# Patient Record
Sex: FEMALE | Race: BLACK OR AFRICAN AMERICAN | Employment: UNEMPLOYED | URBAN - METROPOLITAN AREA
[De-identification: names, ages, dates, MRNs, and addresses within clinical notes are randomized per-mention and may not be internally consistent; named-entity substitution may affect disease eponyms.]

---

## 2022-06-15 ENCOUNTER — HOSPITAL ENCOUNTER (OUTPATIENT)
Age: 37
Setting detail: OUTPATIENT SURGERY
Discharge: HOME | End: 2022-06-15
Attending: OBSTETRICS & GYNECOLOGY | Admitting: OBSTETRICS & GYNECOLOGY
Payer: COMMERCIAL

## 2022-06-15 ENCOUNTER — OFFICE VISIT (OUTPATIENT)
Dept: GYNECOLOGY | Age: 37
End: 2022-06-15
Payer: COMMERCIAL

## 2022-06-15 ENCOUNTER — ANESTHESIA EVENT (OUTPATIENT)
Dept: SURGERY | Age: 37
End: 2022-06-15
Payer: COMMERCIAL

## 2022-06-15 ENCOUNTER — ANESTHESIA (OUTPATIENT)
Dept: SURGERY | Age: 37
End: 2022-06-15
Payer: COMMERCIAL

## 2022-06-15 VITALS
WEIGHT: 230 LBS | HEART RATE: 82 BPM | SYSTOLIC BLOOD PRESSURE: 130 MMHG | OXYGEN SATURATION: 98 % | TEMPERATURE: 99.6 F | RESPIRATION RATE: 16 BRPM | DIASTOLIC BLOOD PRESSURE: 80 MMHG | BODY MASS INDEX: 40.74 KG/M2

## 2022-06-15 VITALS
SYSTOLIC BLOOD PRESSURE: 122 MMHG | BODY MASS INDEX: 40.75 KG/M2 | DIASTOLIC BLOOD PRESSURE: 84 MMHG | HEIGHT: 63 IN | WEIGHT: 230 LBS

## 2022-06-15 DIAGNOSIS — N92.0 MENORRHAGIA WITH REGULAR CYCLE: ICD-10-CM

## 2022-06-15 DIAGNOSIS — D50.0 ANEMIA DUE TO CHRONIC BLOOD LOSS: Primary | ICD-10-CM

## 2022-06-15 DIAGNOSIS — D21.9 FIBROID: ICD-10-CM

## 2022-06-15 DIAGNOSIS — N93.9 ABNORMAL UTERINE BLEEDING (AUB): Primary | ICD-10-CM

## 2022-06-15 DIAGNOSIS — R93.89 THICKENED ENDOMETRIUM: ICD-10-CM

## 2022-06-15 DIAGNOSIS — D64.9 ANEMIA, UNSPECIFIED TYPE: ICD-10-CM

## 2022-06-15 LAB
BASOPHILS # BLD: 0 K/UL (ref 0–0.2)
BASOPHILS NFR BLD: 1 % (ref 0–2)
DIFFERENTIAL METHOD BLD: ABNORMAL
EOSINOPHIL # BLD: 0.1 K/UL (ref 0–0.8)
EOSINOPHIL NFR BLD: 1 % (ref 0.5–7.8)
ERYTHROCYTE [DISTWIDTH] IN BLOOD BY AUTOMATED COUNT: 21.1 % (ref 11.9–14.6)
ERYTHROCYTE [DISTWIDTH] IN BLOOD BY AUTOMATED COUNT: 22.3 % (ref 11.9–14.6)
HCG SERPL QL: NEGATIVE
HCT VFR BLD AUTO: 22.1 % (ref 35.8–46.3)
HCT VFR BLD AUTO: 27.1 % (ref 35.8–46.3)
HEMOGLOBIN, POC: 7.1 G/DL
HGB BLD-MCNC: 5.7 G/DL (ref 11.7–15.4)
HGB BLD-MCNC: 7.7 G/DL (ref 11.7–15.4)
HISTORY CHECK: NORMAL
IMM GRANULOCYTES # BLD AUTO: 0.1 K/UL (ref 0–0.5)
IMM GRANULOCYTES NFR BLD AUTO: 1 % (ref 0–5)
LYMPHOCYTES # BLD: 1.9 K/UL (ref 0.5–4.6)
LYMPHOCYTES NFR BLD: 25 % (ref 13–44)
MCH RBC QN AUTO: 19.7 PG (ref 26.1–32.9)
MCH RBC QN AUTO: 22.8 PG (ref 26.1–32.9)
MCHC RBC AUTO-ENTMCNC: 25.8 G/DL (ref 31.4–35)
MCHC RBC AUTO-ENTMCNC: 28.4 G/DL (ref 31.4–35)
MCV RBC AUTO: 76.2 FL (ref 79.6–97.8)
MCV RBC AUTO: 80.4 FL (ref 79.6–97.8)
MONOCYTES # BLD: 0.4 K/UL (ref 0.1–1.3)
MONOCYTES NFR BLD: 6 % (ref 4–12)
NEUTS SEG # BLD: 5.2 K/UL (ref 1.7–8.2)
NEUTS SEG NFR BLD: 68 % (ref 43–78)
NRBC # BLD: 0.12 K/UL (ref 0–0.2)
NRBC # BLD: 0.13 K/UL (ref 0–0.2)
PLATELET # BLD AUTO: 419 K/UL (ref 150–450)
PLATELET # BLD AUTO: 430 K/UL (ref 150–450)
PMV BLD AUTO: 10.6 FL (ref 9.4–12.3)
PMV BLD AUTO: 11.4 FL (ref 9.4–12.3)
RBC # BLD AUTO: 2.9 M/UL (ref 4.05–5.2)
RBC # BLD AUTO: 3.37 M/UL (ref 4.05–5.2)
WBC # BLD AUTO: 7.6 K/UL (ref 4.3–11.1)
WBC # BLD AUTO: 8.5 K/UL (ref 4.3–11.1)

## 2022-06-15 PROCEDURE — 85027 COMPLETE CBC AUTOMATED: CPT

## 2022-06-15 PROCEDURE — 2580000003 HC RX 258: Performed by: NURSE ANESTHETIST, CERTIFIED REGISTERED

## 2022-06-15 PROCEDURE — 85018 HEMOGLOBIN: CPT | Performed by: OBSTETRICS & GYNECOLOGY

## 2022-06-15 PROCEDURE — 99203 OFFICE O/P NEW LOW 30 MIN: CPT | Performed by: OBSTETRICS & GYNECOLOGY

## 2022-06-15 PROCEDURE — 3600000003 HC SURGERY LEVEL 3 BASE: Performed by: OBSTETRICS & GYNECOLOGY

## 2022-06-15 PROCEDURE — 85025 COMPLETE CBC W/AUTO DIFF WBC: CPT

## 2022-06-15 PROCEDURE — 3700000000 HC ANESTHESIA ATTENDED CARE: Performed by: OBSTETRICS & GYNECOLOGY

## 2022-06-15 PROCEDURE — 58558 HYSTEROSCOPY BIOPSY: CPT | Performed by: OBSTETRICS & GYNECOLOGY

## 2022-06-15 PROCEDURE — P9016 RBC LEUKOCYTES REDUCED: HCPCS

## 2022-06-15 PROCEDURE — 86923 COMPATIBILITY TEST ELECTRIC: CPT

## 2022-06-15 PROCEDURE — 86901 BLOOD TYPING SEROLOGIC RH(D): CPT

## 2022-06-15 PROCEDURE — 7100000000 HC PACU RECOVERY - FIRST 15 MIN: Performed by: OBSTETRICS & GYNECOLOGY

## 2022-06-15 PROCEDURE — 84703 CHORIONIC GONADOTROPIN ASSAY: CPT

## 2022-06-15 PROCEDURE — 2500000003 HC RX 250 WO HCPCS: Performed by: NURSE ANESTHETIST, CERTIFIED REGISTERED

## 2022-06-15 PROCEDURE — 88305 TISSUE EXAM BY PATHOLOGIST: CPT

## 2022-06-15 PROCEDURE — 7100000001 HC PACU RECOVERY - ADDTL 15 MIN: Performed by: OBSTETRICS & GYNECOLOGY

## 2022-06-15 PROCEDURE — 7100000010 HC PHASE II RECOVERY - FIRST 15 MIN: Performed by: OBSTETRICS & GYNECOLOGY

## 2022-06-15 PROCEDURE — 6360000002 HC RX W HCPCS: Performed by: NURSE ANESTHETIST, CERTIFIED REGISTERED

## 2022-06-15 PROCEDURE — 3700000001 HC ADD 15 MINUTES (ANESTHESIA): Performed by: OBSTETRICS & GYNECOLOGY

## 2022-06-15 PROCEDURE — 2709999900 HC NON-CHARGEABLE SUPPLY: Performed by: OBSTETRICS & GYNECOLOGY

## 2022-06-15 PROCEDURE — 3600000013 HC SURGERY LEVEL 3 ADDTL 15MIN: Performed by: OBSTETRICS & GYNECOLOGY

## 2022-06-15 PROCEDURE — 76830 TRANSVAGINAL US NON-OB: CPT | Performed by: OBSTETRICS & GYNECOLOGY

## 2022-06-15 RX ORDER — OXYCODONE HYDROCHLORIDE 5 MG/1
5 TABLET ORAL
Status: DISCONTINUED | OUTPATIENT
Start: 2022-06-15 | End: 2022-06-15 | Stop reason: HOSPADM

## 2022-06-15 RX ORDER — LIDOCAINE HYDROCHLORIDE 20 MG/ML
INJECTION, SOLUTION EPIDURAL; INFILTRATION; INTRACAUDAL; PERINEURAL PRN
Status: DISCONTINUED | OUTPATIENT
Start: 2022-06-15 | End: 2022-06-15 | Stop reason: SDUPTHER

## 2022-06-15 RX ORDER — FENTANYL CITRATE 50 UG/ML
INJECTION, SOLUTION INTRAMUSCULAR; INTRAVENOUS PRN
Status: DISCONTINUED | OUTPATIENT
Start: 2022-06-15 | End: 2022-06-15 | Stop reason: SDUPTHER

## 2022-06-15 RX ORDER — MEDROXYPROGESTERONE ACETATE 10 MG/1
10 TABLET ORAL 2 TIMES DAILY
Qty: 30 TABLET | Refills: 3 | Status: SHIPPED | OUTPATIENT
Start: 2022-06-15 | End: 2022-09-30

## 2022-06-15 RX ORDER — SUCCINYLCHOLINE/SOD CL,ISO/PF 200MG/10ML
SYRINGE (ML) INTRAVENOUS PRN
Status: DISCONTINUED | OUTPATIENT
Start: 2022-06-15 | End: 2022-06-15 | Stop reason: SDUPTHER

## 2022-06-15 RX ORDER — HYDROCODONE BITARTRATE AND ACETAMINOPHEN 5; 325 MG/1; MG/1
1 TABLET ORAL EVERY 6 HOURS PRN
Qty: 12 TABLET | Refills: 0 | Status: SHIPPED | OUTPATIENT
Start: 2022-06-15 | End: 2022-06-18

## 2022-06-15 RX ORDER — PROCHLORPERAZINE EDISYLATE 5 MG/ML
5 INJECTION INTRAMUSCULAR; INTRAVENOUS
Status: DISCONTINUED | OUTPATIENT
Start: 2022-06-15 | End: 2022-06-15 | Stop reason: HOSPADM

## 2022-06-15 RX ORDER — SODIUM CHLORIDE 0.9 % (FLUSH) 0.9 %
5-40 SYRINGE (ML) INJECTION EVERY 12 HOURS SCHEDULED
Status: DISCONTINUED | OUTPATIENT
Start: 2022-06-15 | End: 2022-06-15 | Stop reason: HOSPADM

## 2022-06-15 RX ORDER — SODIUM CHLORIDE 0.9 % (FLUSH) 0.9 %
5-40 SYRINGE (ML) INJECTION PRN
Status: DISCONTINUED | OUTPATIENT
Start: 2022-06-15 | End: 2022-06-15 | Stop reason: HOSPADM

## 2022-06-15 RX ORDER — DEXAMETHASONE SODIUM PHOSPHATE 10 MG/ML
INJECTION INTRAMUSCULAR; INTRAVENOUS PRN
Status: DISCONTINUED | OUTPATIENT
Start: 2022-06-15 | End: 2022-06-15 | Stop reason: SDUPTHER

## 2022-06-15 RX ORDER — MIDAZOLAM HYDROCHLORIDE 2 MG/2ML
2 INJECTION, SOLUTION INTRAMUSCULAR; INTRAVENOUS
Status: CANCELLED | OUTPATIENT
Start: 2022-06-15 | End: 2022-06-15

## 2022-06-15 RX ORDER — LIDOCAINE HYDROCHLORIDE 10 MG/ML
1 INJECTION, SOLUTION EPIDURAL; INFILTRATION; INTRACAUDAL; PERINEURAL
Status: CANCELLED | OUTPATIENT
Start: 2022-06-15 | End: 2022-06-15

## 2022-06-15 RX ORDER — HYDROMORPHONE HYDROCHLORIDE 1 MG/ML
0.5 INJECTION, SOLUTION INTRAMUSCULAR; INTRAVENOUS; SUBCUTANEOUS EVERY 5 MIN PRN
Status: DISCONTINUED | OUTPATIENT
Start: 2022-06-15 | End: 2022-06-15 | Stop reason: HOSPADM

## 2022-06-15 RX ORDER — DOXYCYCLINE HYCLATE 50 MG/1
324 CAPSULE, GELATIN COATED ORAL
Qty: 30 TABLET | Refills: 3 | Status: SHIPPED | OUTPATIENT
Start: 2022-06-15 | End: 2022-09-30

## 2022-06-15 RX ORDER — SODIUM CHLORIDE 9 MG/ML
INJECTION, SOLUTION INTRAVENOUS PRN
Status: DISCONTINUED | OUTPATIENT
Start: 2022-06-15 | End: 2022-06-15 | Stop reason: HOSPADM

## 2022-06-15 RX ORDER — ONDANSETRON 2 MG/ML
INJECTION INTRAMUSCULAR; INTRAVENOUS PRN
Status: DISCONTINUED | OUTPATIENT
Start: 2022-06-15 | End: 2022-06-15 | Stop reason: SDUPTHER

## 2022-06-15 RX ORDER — PROPOFOL 10 MG/ML
INJECTION, EMULSION INTRAVENOUS PRN
Status: DISCONTINUED | OUTPATIENT
Start: 2022-06-15 | End: 2022-06-15 | Stop reason: SDUPTHER

## 2022-06-15 RX ORDER — SODIUM CHLORIDE, SODIUM LACTATE, POTASSIUM CHLORIDE, CALCIUM CHLORIDE 600; 310; 30; 20 MG/100ML; MG/100ML; MG/100ML; MG/100ML
INJECTION, SOLUTION INTRAVENOUS CONTINUOUS PRN
Status: DISCONTINUED | OUTPATIENT
Start: 2022-06-15 | End: 2022-06-15 | Stop reason: SDUPTHER

## 2022-06-15 RX ORDER — FENTANYL CITRATE 50 UG/ML
100 INJECTION, SOLUTION INTRAMUSCULAR; INTRAVENOUS
Status: CANCELLED | OUTPATIENT
Start: 2022-06-15 | End: 2022-06-15

## 2022-06-15 RX ORDER — IBUPROFEN 600 MG/1
600 TABLET ORAL 4 TIMES DAILY PRN
Qty: 40 TABLET | Refills: 0 | Status: SHIPPED | OUTPATIENT
Start: 2022-06-15

## 2022-06-15 RX ORDER — SODIUM CHLORIDE, SODIUM LACTATE, POTASSIUM CHLORIDE, CALCIUM CHLORIDE 600; 310; 30; 20 MG/100ML; MG/100ML; MG/100ML; MG/100ML
100 INJECTION, SOLUTION INTRAVENOUS CONTINUOUS
Status: CANCELLED | OUTPATIENT
Start: 2022-06-15

## 2022-06-15 RX ADMIN — LIDOCAINE HYDROCHLORIDE 60 MG: 20 INJECTION, SOLUTION EPIDURAL; INFILTRATION; INTRACAUDAL; PERINEURAL at 15:53

## 2022-06-15 RX ADMIN — DEXAMETHASONE SODIUM PHOSPHATE 10 MG: 10 INJECTION INTRAMUSCULAR; INTRAVENOUS at 16:13

## 2022-06-15 RX ADMIN — SODIUM CHLORIDE, SODIUM LACTATE, POTASSIUM CHLORIDE, AND CALCIUM CHLORIDE: 600; 310; 30; 20 INJECTION, SOLUTION INTRAVENOUS at 16:32

## 2022-06-15 RX ADMIN — ONDANSETRON 4 MG: 2 INJECTION INTRAMUSCULAR; INTRAVENOUS at 16:13

## 2022-06-15 RX ADMIN — SODIUM CHLORIDE, SODIUM LACTATE, POTASSIUM CHLORIDE, AND CALCIUM CHLORIDE: 600; 310; 30; 20 INJECTION, SOLUTION INTRAVENOUS at 15:47

## 2022-06-15 RX ADMIN — PHENYLEPHRINE HYDROCHLORIDE 100 MCG: 0.1 INJECTION, SOLUTION INTRAVENOUS at 16:17

## 2022-06-15 RX ADMIN — Medication 180 MG: at 15:53

## 2022-06-15 RX ADMIN — FENTANYL CITRATE 100 MCG: 50 INJECTION INTRAMUSCULAR; INTRAVENOUS at 15:51

## 2022-06-15 RX ADMIN — PROPOFOL 150 MG: 10 INJECTION, EMULSION INTRAVENOUS at 15:53

## 2022-06-15 ASSESSMENT — PAIN - FUNCTIONAL ASSESSMENT: PAIN_FUNCTIONAL_ASSESSMENT: 0-10

## 2022-06-15 NOTE — OP NOTE
HYSTEROSCOPY WITH DILATATION AND CURETTAGE    Je Pratt  [id number]    DATE OF PROCEDURE: 6/15/2022     PREOPERATIVE DIAGNOSIS: Menorrhagia with regular cycle [N92.0] , Anemia due to chronic and acute blood loss    POSTOPERATWE DIAGNOSIS: same    PROCEDURE: Hysteroscopy with dilatation & curettage. SURGEON: Peng Ibrahim MD    ANESTHESIA: General.    DRAINS: Sterile in and out catheterization of the bladder. BLOOD LOSS ESTIMATED: less than 100 ml    SPECIMENS: profound amount of  Endometrial curettings      FINDINGS: Normal endocervical canal. Endometrial cavity was markedly enlarged, symmetrical, homogenous with palpation and curettage. Tubal ostia was not initially visible. After curettage, the endometrial cavity was symmetrical and tubal ostia noted to be in appropriate locations. No visible evidence of any surface irregularities of the endometrial surface and no visible asymmetry to suggest a point of penetration or an embedded foreign object    PROCEDURE IN DETAIL: The patient was taken to the Operating Room. The patient was anesthetized using General. After adequate anesthesia was established she was properly positioned, scrubbed, prepped and draped. Time out was done to confirm the operating procedure, surgeon, patient and site. Once confirmed by the team, procedure was started. The weighted speculum was placed in the vault to expose the cervix, was grasped at 12 oclock with the single-tooth tenaculum and sounded to 16 cm. It was sequentially dilated prior to the hysteroscope being inserted with the above noted findings. A very gentle but homogenous curetting was done starting in the midline and working in a clockwise manner. A profound amount of endometrial tissue was reirieved. Arturo-Stone forceps were used to remove the clots and tissue. The hysteroscope was reinserted again. There was no evidence of any surface irregularity to suggest recent penetration.  With this complete and thorough visual review, we terminated the procedure. There was no bleeding at the tenaculum site. With the sponge, instrument and needle count correct, the patient was awakened and taken to the Recovery Room in satisfactory condition. She will be discharged home to follow-up in the office in two weeks. She is to call for increased bleeding,  increased pain, fever, or any other concerns. Michael Lacey

## 2022-06-15 NOTE — PERIOP NOTE
Discharge teaching/follow up appt reviewed with patient and caregiver. Caregiver voiced understanding of teaching. All questions answered. Hgb 7.7. Dr. Deanne Ng ok with patient being discharged.

## 2022-06-15 NOTE — CONSENT
Informed Consent for Blood Component Transfusion Note    I have discussed with the patient the rationale for blood component transfusion; its benefits in treating or preventing fatigue, organ damage, or death; and its risk which includes mild transfusion reactions, rare risk of blood borne infection, or more serious but rare reactions. I have discussed the alternatives to transfusion, including the risk and consequences of not receiving transfusion. The patient had an opportunity to ask questions and had agreed to proceed with transfusion of blood components.     Electronically signed by Michell Fischer MD on 6/15/22 at 2:40 PM EDT

## 2022-06-15 NOTE — PROGRESS NOTES
EVERARDO Coronado is a 44 y.o. female seen for menorrhagia. Her period started 10 days ago and has been extremely heavy with large clots. Her periods have always been heavy with clots but they are becoming progressively heavier. In the past she had a D&C which showed complex simple hyperplasia. She is trying to have a baby. Hgb was 7 in the office today    Past Medical History, Past Surgical History, Family history, Social History, and Medications were all reviewed with the patient today and updated as necessary. Current Outpatient Medications   Medication Sig    Ferrous Gluconate-C-Folic Acid (IRON-C PO) Take by mouth     No current facility-administered medications for this visit. No Known Allergies  Past Medical History:   Diagnosis Date    Fibroid     Iron deficiency anemia     Thickened endometrium      Past Surgical History:   Procedure Laterality Date    APPENDECTOMY      CHOLECYSTECTOMY      DILATION AND CURETTAGE OF UTERUS       History reviewed. No pertinent family history. Social History     Tobacco Use    Smoking status: Never Smoker    Smokeless tobacco: Never Used   Substance Use Topics    Alcohol use: Not Currently       Social History     Substance and Sexual Activity   Sexual Activity Yes    Partners: Male     OB History    Para Term  AB Living   1 1 0 0 0 0   SAB IAB Ectopic Molar Multiple Live Births   0 0 0 0 0 0      # Outcome Date GA Lbr Logan/2nd Weight Sex Delivery Anes PTL Lv   1 Para                Health Maintenance  Mammogram:   Colonoscopy:   Bone Density:    ROS:    Review of Systems  General: Not Present- Chills, Fever, Fatigue, Insomnia, Hot flashes/Night sweats, Weight gain  Skin: Not Present- Bruising, Change in Wart/Mole, Excessive Sweating, Itching, Nail Changes, New Lesions, Rash, Skin Color Changes and Ulcer.   HEENT: Not Present- Headache, Blurred Vision, Double Vision, Glaucoma, Visual Disturbances, Hearing Loss, Ringing in the Ears, Vertigo, Nose Bleed, Bleeding Gums, Hoarseness and Sore Throat. Neck: Not Present- Neck Pain and Neck Swelling. Respiratory: Not Present- Cough, Difficulty Breathing and Difficulty Breathing on Exertion. Breast: Not Present- Breast Mass, Breast Pain, Breast Swelling, Nipple Discharge, Nipple Pain, Recent Breast Size Changes and Skin Changes. Cardiovascular: Not Present- Abnormal Blood Pressure, Chest Pain, Edema, Fainting / Blacking Out, Palpitations, Shortness of Breath and Swelling of Extremities. Gastrointestinal: Not Present- Abdominal Pain, Abdominal Swelling, Bloating, Change in Bowel Habits, Constipation, Diarrhea, Difficulty Swallowing, Gets full quickly at meals, Nausea, Rectal Bleeding and Vomiting. Female Genitourinary: Not Present- Dysmenorrhea, Dyspareunia, Decreased libido, Excessive Menstrual Bleeding, Menstrual Irregularities, Pelvic Pain, Urinary Complaints, Vaginal Discharge, Vaginal itching/burning, Vaginal odor  Musculoskeletal: Not Present- Joint Pain and Muscle Pain. Neurological: Not Present- Dizziness, Fainting, Headaches and Seizures. Psychiatric: Not Present- Anxiety, Depression, Mood changes and Panic Attacks. Endocrine: Not Present- Appetite Changes, Cold Intolerance, Excessive Thirst, Excessive Urination and Heat Intolerance. Hematology: Not Present- Abnormal Bleeding, Easy Bruising and Enlarged Lymph Nodes. PHYSICAL EXAM:    /84 (Position: Sitting)   Ht 5' 3\" (1.6 m)   Wt 230 lb (104.3 kg)   LMP 06/09/2022   BMI 40.74 kg/m²     Physical Exam   General   Mental Status - Alert. General Appearance - Cooperative. Abdomen   Inspection: - Inspection Normal.   Palpation/Percussion: Palpation and Percussion of the abdomen reveal - Non Tender, No Rebound tenderness, No Rigidity (guarding), No hepatosplenomegaly, No Palpable abdominal masses and Soft.    Auscultation: Auscultation of the abdomen reveals - Bowel sounds normal.     PELVIC EXAM - External genitalia normal.  Cervix appears normal.  Heavy bleeding with blood pooling in the vault. Lymphatics  No cervical, axillary or groin adenopathy            Medical problems and test results were reviewed with the patient today. ASSESSMENT and PLAN    Blu Moss was seen today for irregular menses. Diagnoses and all orders for this visit:    Abnormal uterine bleeding (AUB)  -     US NON OB TRANSVAGINAL  -     AMB POC HEMOGLOBIN (HGB)  -     [facility]    Anemia, unspecified type  -     AMB POC HEMOGLOBIN (HGB)    Fibroid    Thickened endometrium  -     06346 - PA BIOPSY OF UTERUS LINING  -     [facility]        Comment   [id number]----jao   HISTORY: Heavy clotty bleeding for about 7-10 days. Patient feels weak and light headed. History of D&C in   2016. COMPARISON: None available   Enlarged uterus = 19wks with one fundal fibroid that measures 8.4/7.5/9.1cm. This fibroid appears to be starting to   push into the endometrium. Endometrium = 36.9mm----inhomogeneous and thick   Rt ovary is normal.   Lt ovary is normal.   Both ovaries are only seen abdominally and appear normal   No free fluid noted in the pelvis. Gyn Report [facility]   Name: Roro Fleming Patient ID: [id number]   Date: 06/15/2022 Perf. Physician: Dr. Liu Londono MD Sonographer: Marie Heath, [address] [place] [address] done in my office and discussed with patient. Discussed with Dr. Jade Christian. Patient to be sent to ER for D&C. Time:  I spent  30 minutes in preparing to see patient (including chart review and preparation), obtaining and/or reviewing additional medical history, performing a physical exam and evaluation, documenting clinical information in the electronic health record, independently interpreting results, communicating results to patient, family or caregiver, and/or coordinating care. No follow-up provider specified.         Renu Hook MD

## 2022-06-15 NOTE — ANESTHESIA PRE PROCEDURE
Department of Anesthesiology  Preprocedure Note       Name:  Lisbeth Steven   Age:  44 y.o.  :  1985                                          MRN:  [id number]         Date:  6/15/2022      Surgeon: Anaya Fowler):  Ramiro Dong MD    Procedure: Procedure(s):  DILATATION AND CURETTAGE HYSTEROSCOPY    Medications prior to admission:   Prior to Admission medications    Medication Sig Start Date End Date Taking?  Authorizing Provider   Ferrous Gluconate-C-Folic Acid (IRON-C PO) Take by mouth    Historical Provider, MD       Current medications:    Current Facility-Administered Medications   Medication Dose Route Frequency Provider Last Rate Last Admin    sodium chloride flush 0.9 % injection 5-40 mL  5-40 mL IntraVENous 2 times per day Ramiro Dong MD        sodium chloride flush 0.9 % injection 5-40 mL  5-40 mL IntraVENous PRN Ramiro Dong MD        0.9 % sodium chloride infusion   IntraVENous PRN Ramiro Dong MD           Allergies:  No Known Allergies    Problem List:    Patient Active Problem List   Diagnosis Code    Menorrhagia with regular cycle N92.0    Anemia due to chronic blood loss D50.0       Past Medical History:        Diagnosis Date    Fibroid     Iron deficiency anemia     Thickened endometrium        Past Surgical History:        Procedure Laterality Date    APPENDECTOMY      CHOLECYSTECTOMY      DILATION AND CURETTAGE OF UTERUS         Social History:    Social History     Tobacco Use    Smoking status: Never Smoker    Smokeless tobacco: Never Used   Substance Use Topics    Alcohol use: Not Currently                                Counseling given: Not Answered      Vital Signs (Current):   Vitals:    06/15/22 1357   BP: (!) 146/81   Pulse: 93   Resp: 18   Temp: 99.8 °F (37.7 °C)   TempSrc: Temporal   SpO2: 99%   Weight: 230 lb (104.3 kg)                                              BP Readings from Last 3 Encounters:   06/15/22 (!) 146/81   06/15/22 122/84       NPO Status: Time of last liquid consumption: 1000 (protein shake)                        Time of last solid consumption: 2100                        Date of last liquid consumption: 06/15/22                        Date of last solid food consumption: 06/14/22    BMI:   Wt Readings from Last 3 Encounters:   06/15/22 230 lb (104.3 kg)   06/15/22 230 lb (104.3 kg)     Body mass index is 40.74 kg/m². CBC: No results found for: WBC, RBC, HGB, HCT, MCV, RDW, PLT    CMP: No results found for: NA, K, CL, CO2, BUN, CREATININE, GFRAA, AGRATIO, LABGLOM, GLUCOSE, GLU, PROT, CALCIUM, BILITOT, ALKPHOS, AST, ALT    POC Tests: No results for input(s): POCGLU, POCNA, POCK, POCCL, POCBUN, POCHEMO, POCHCT in the last 72 hours. Coags: No results found for: PROTIME, INR, APTT    HCG (If Applicable): No results found for: PREGTESTUR, PREGSERUM, HCG, HCGQUANT     ABGs: No results found for: PHART, PO2ART, FXE2ANA, BJU9BKQ, BEART, O8CLGZUG     Type & Screen (If Applicable):  No results found for: LABABO, LABRH    Drug/Infectious Status (If Applicable):  No results found for: HIV, HEPCAB    COVID-19 Screening (If Applicable): No results found for: COVID19        Anesthesia Evaluation    Airway: Mallampati: III  TM distance: >3 FB   Neck ROM: full  Mouth opening: > = 3 FB   Dental: normal exam         Pulmonary:Negative Pulmonary ROS and normal exam  breath sounds clear to auscultation                             Cardiovascular:Negative CV ROS  Exercise tolerance: good (>4 METS),   (+) murmur: Grade 3, Mitral,         Rhythm: regular  Rate: normal                    Neuro/Psych:   Negative Neuro/Psych ROS              GI/Hepatic/Renal:   (+) morbid obesity          Endo/Other:    (+) blood dyscrasia (hemoglobin 5.8): anemia:., .                 Abdominal:             Vascular: negative vascular ROS.          Other Findings:           Anesthesia Plan      general     ASA 3 - emergent     (Pt not NPO but case declared emergent by surgeon. Glidescope in room.)  Induction: intravenous and rapid sequence. Anesthetic plan and risks discussed with patient and spouse. Use of blood products discussed with patient and spouse whom.                      Neri Diaz MD   6/15/2022

## 2022-06-15 NOTE — ANESTHESIA POSTPROCEDURE EVALUATION
Department of Anesthesiology  Postprocedure Note    Patient: Pau Mcgee  MRN: [id number]  [place]: 1985  Date of evaluation: 6/15/2022  Time:  5:31 PM     Procedure Summary     Date: 06/15/22 [place] [facility] [place] [facility] [place]    Anesthesia Start: 8311 Anesthesia Stop: 5685    Procedure: DILATATION AND CURETTAGE HYSTEROSCOPY (N/A Vagina ) Diagnosis:       Menorrhagia with regular cycle      (Menorrhagia with regular cycle [N92.0])    Surgeons: Willow Oquendo MD Responsible Provider: Tameka Faith MD    Anesthesia Type: General ASA Status: 3 - Emergent          Anesthesia Type: General    Dany Phase I:      Dany Phase II:      Last vitals: Reviewed and per EMR flowsheets.        Anesthesia Post Evaluation    Patient location during evaluation: PACU  Patient participation: complete - patient participated  Level of consciousness: awake  Airway patency: patent  Nausea & Vomiting: no nausea  Complications: no  Cardiovascular status: hemodynamically stable  Respiratory status: acceptable and nonlabored ventilation  Hydration status: stable  Comments: Repeat Hgb 7.7 after 2 units PRBCs  Multimodal analgesia pain management approach

## 2022-06-15 NOTE — H&P
Exam  Constitutional:       Appearance: Normal appearance. She is obese. HENT:      Head: Normocephalic and atraumatic. Mouth/Throat:      Mouth: Mucous membranes are moist.   Eyes:      Extraocular Movements: Extraocular movements intact. Pupils: Pupils are equal, round, and reactive to light. Cardiovascular:      Rate and Rhythm: Normal rate and regular rhythm. Pulses: Normal pulses. Heart sounds: Normal heart sounds. Pulmonary:      Effort: Pulmonary effort is normal.      Breath sounds: Normal breath sounds. Abdominal:      Palpations: Abdomen is soft. Musculoskeletal:         General: Normal range of motion. Cervical back: Normal range of motion. Skin:     General: Skin is warm and dry. Capillary Refill: Capillary refill takes less than 2 seconds. Neurological:      General: No focal deficit present. Mental Status: She is alert and oriented to person, place, and time. Psychiatric:         Mood and Affect: Mood normal.         Behavior: Behavior normal.         Assessment:     Principal Problem:    Menorrhagia with regular cycle  Active Problems:    Anemia due to chronic blood loss  Resolved Problems:    * No resolved hospital problems. *     Fibroids and Menorrhagia with anemia and thickened endometrium    Plan:     Procedure(s) (LRB):  DILATATION AND CURETTAGE HYSTEROSCOPY (N/A)  Discussed the risks of surgery including the risks of bleeding, infection, deep vein thrombosis, and surgical injuries to internal organs including but not limited to the bowels, bladder, rectum, and female reproductive organs. The patient understands the risks; any and all questions were answered to the patient's satisfaction.

## 2022-06-16 LAB
ABO + RH BLD: NORMAL
BLD PROD TYP BPU: NORMAL
BLD PROD TYP BPU: NORMAL
BLOOD BANK DISPENSE STATUS: NORMAL
BLOOD BANK DISPENSE STATUS: NORMAL
BLOOD GROUP ANTIBODIES SERPL: NORMAL
BPU ID: NORMAL
BPU ID: NORMAL
CROSSMATCH RESULT: NORMAL
CROSSMATCH RESULT: NORMAL
SPECIMEN EXP DATE BLD: NORMAL
UNIT DIVISION: 0
UNIT DIVISION: 0

## 2022-06-22 ENCOUNTER — TELEPHONE (OUTPATIENT)
Dept: OBGYN CLINIC | Age: 37
End: 2022-06-22

## 2022-06-22 NOTE — TELEPHONE ENCOUNTER
Verify pt is taking progesterone   Cramping can be normal would alternate tylenol 1000 and ibuprofen     Pt is taking Provera as directed. Advised to continue and to alternate Tylenol and Ibuprofen. Hydrate well and can use heating pad as well. Pt v/u. She is advised to call back with questions/further concerns or go to the ER for severe pain.

## 2022-06-22 NOTE — TELEPHONE ENCOUNTER
Pt is s/p D & C with Dr. Dima Bejarano 6/15/22 for heavy VB and Hgb of 7 (Pt was seen by Dr. Ivelisse Carlin and sent to the ER for emergent D & C). She is calling today with c/o increased cramping type pain for the last 3 days. States it is constant and unbearable. She is still taking Ibuprofen 600 mg QID (given # 40 to take QID prn). She completed Norco 5 (given # 12 to take q 6 hrs prn for 3 days). She reports continued light bleeding. Denies N/V/D/F. Please advise.

## 2022-06-24 DIAGNOSIS — D50.0 ANEMIA DUE TO CHRONIC BLOOD LOSS: ICD-10-CM

## 2022-06-24 DIAGNOSIS — N85.02 COMPLEX ATYPICAL ENDOMETRIAL HYPERPLASIA: ICD-10-CM

## 2022-06-24 DIAGNOSIS — N92.0 MENORRHAGIA WITH REGULAR CYCLE: Primary | ICD-10-CM

## 2022-06-27 DIAGNOSIS — D50.0 ANEMIA DUE TO CHRONIC BLOOD LOSS: Primary | ICD-10-CM

## 2022-06-27 DIAGNOSIS — N85.02 COMPLEX ATYPICAL ENDOMETRIAL HYPERPLASIA: ICD-10-CM

## 2022-06-27 NOTE — PROGRESS NOTES
[facility] is out of network and pt would have 100% responsibility. New referral placed for Dr. Giana Fournier.

## 2022-07-05 NOTE — PROGRESS NOTES
Date: 7/6/2022        Patient Name: Keegan Fine     YOB: 1985          Chief Complaint     Chief Complaint   Patient presents with    Follow-up    endometrial hyperplasia, atypical   Pt reports episode in past  Taking provera, 20 daily    Large fibroids    Obesity    Abnormal bleeding   Many months, at least since last fall   Pt reports has continued bleeding daily since d and c with gyn    Reported 2 untis prbc at procedure. Wishes to preserve fertility    Has used some asa during this recent bleeding episode      History of Present Illness   Keegan Fine is a 44 y.o. who presents today for scheduled visit    Noted long hx abnormal bleeding, and about 2 years ago reports episode hyperplasia requireing tx. Increasing bleding over at least six moths, eventually d and c with gyn, complex hyperplasia  Also at that time recived 2 u prbc    Reports continued bleeding since d and c, no knonwn h/h with gyn, taking provera 20 daily as directed, with increasing fatigue.     Past Medical History     Past Medical History:   Diagnosis Date    Fibroid     Iron deficiency anemia     Thickened endometrium         Past Surgical History     Past Surgical History:   Procedure Laterality Date    APPENDECTOMY      CHOLECYSTECTOMY      DILATION AND CURETTAGE OF UTERUS      DILATION AND CURETTAGE OF UTERUS N/A 6/15/2022    DILATATION AND CURETTAGE HYSTEROSCOPY performed by Brett Candelaria MD at [facility]        Medications      Current Outpatient Medications:     megestrol (MEGACE) 40 MG tablet, Take 2 tablets by mouth 2 times daily, Disp: 120 tablet, Rfl: 3    ibuprofen (ADVIL;MOTRIN) 800 MG tablet, Take 1 tablet by mouth every 8 hours as needed for Pain, Disp: 21 tablet, Rfl: 0    Ferrous Gluconate-C-Folic Acid (IRON-C PO), Take by mouth, Disp: , Rfl:     ferrous gluconate (FERGON) 324 (38 Fe) MG tablet, Take 1 tablet by mouth daily (with breakfast), Disp: 30 tablet, Rfl: 3   ibuprofen (ADVIL;MOTRIN) 600 MG tablet, Take 1 tablet by mouth 4 times daily as needed for Pain, Disp: 40 tablet, Rfl: 0    medroxyPROGESTERone (PROVERA) 10 MG tablet, Take 1 tablet by mouth in the morning and at bedtime, Disp: 30 tablet, Rfl: 3     Allergies   Patient has no known allergies. Social History     Social History     Tobacco History     Smoking Status  Never Smoker    Smokeless Tobacco Use  Never Used          Alcohol History     Alcohol Use Status  Not Currently          Drug Use     Drug Use Status  Never          Sexual Activity     Sexually Active  Yes Partners  Male                Family History   No family history on file. Review of Systems   Review of Systems   Constitutional: Positive for activity change and fatigue. HENT: Negative. Eyes: Negative. Respiratory: Negative. Cardiovascular: Negative. Gastrointestinal: Negative. Endocrine: Negative. Genitourinary: Positive for menstrual problem and vaginal bleeding. Musculoskeletal: Negative. Allergic/Immunologic: Negative. Neurological: Positive for light-headedness. Hematological: Negative. Psychiatric/Behavioral: Negative. All other systems reviewed and are negative. Physical Exam     ECOG PERFORMANCE STATUS - 2 - Ambulatory and capable of all selfcare but unable to carry out any work activities. Up and about more than 50% of waking hours. Blood pressure 116/76, pulse 99, temperature 100 °F (37.8 °C), temperature source Oral, resp. rate 16, height 5' 3\" (1.6 m), weight 224 lb 4.8 oz (101.7 kg), last menstrual period 06/09/2022, SpO2 100 %. Physical Exam  Vitals and nursing note reviewed. Exam conducted with a chaperone present. Constitutional:       Appearance: Normal appearance. She is obese. HENT:      Head: Normocephalic and atraumatic. Nose: No congestion. Cardiovascular:      Rate and Rhythm: Normal rate and regular rhythm. Heart sounds: Normal heart sounds.    Pulmonary: Effort: Pulmonary effort is normal. No respiratory distress. Breath sounds: Normal breath sounds. Abdominal:      General: Abdomen is flat. Palpations: Abdomen is soft. Musculoskeletal:         General: Normal range of motion. Skin:     General: Skin is warm and dry. Neurological:      General: No focal deficit present. Mental Status: She is alert and oriented to person, place, and time. Psychiatric:         Mood and Affect: Mood normal.         Behavior: Behavior normal.         Thought Content:  Thought content normal.         Judgment: Judgment normal.         Labs        Recent Results (from the past 48 hour(s))   CBC with Auto Differential    Collection Time: 07/06/22  2:57 PM   Result Value Ref Range    WBC 9.6 4.3 - 11.1 K/uL    RBC 2.08 (L) 4.05 - 5.2 M/uL    Hemoglobin 4.2 (LL) 11.7 - 15.4 g/dL    Hematocrit 15.6 (L) 35.8 - 46.3 %    MCV 75.0 (L) 79.6 - 97.8 FL    MCH 20.2 (L) 26.1 - 32.9 PG    MCHC 26.9 (L) 31.4 - 35.0 g/dL    RDW 24.2 (H) 11.9 - 14.6 %    Platelets 548 (H) 476 - 450 K/uL    MPV 10.1 9.4 - 12.3 FL    nRBC 0.23 (H) 0.0 - 0.2 K/uL    Differential Type AUTOMATED      Seg Neutrophils 76 43 - 78 %    Lymphocytes 17 13 - 44 %    Monocytes 5 4.0 - 12.0 %    Eosinophils % 1 0.5 - 7.8 %    Basophils 0 0.0 - 2.0 %    Immature Granulocytes 1 0.0 - 5.0 %    Segs Absolute 7.3 1.7 - 8.2 K/UL    Absolute Lymph # 1.6 0.5 - 4.6 K/UL    Absolute Mono # 0.5 0.1 - 1.3 K/UL    Absolute Eos # 0.1 0.0 - 0.8 K/UL    Basophils Absolute 0.0 0.0 - 0.2 K/UL    Absolute Immature Granulocyte 0.1 0.0 - 0.5 K/UL   Transferrin Saturation    Collection Time: 07/06/22  2:57 PM   Result Value Ref Range    Iron 11 (L) 35 - 150 ug/dL    TIBC 292 250 - 450 ug/dL    TRANSFERRIN SATURATION 4 (L) >20 %   Ferritin    Collection Time: 07/06/22  2:57 PM   Result Value Ref Range    Ferritin 5 (L) 8 - 388 NG/ML        No results found for:      Pathology      Accession Number:   [id number]    #   and thick  Rt ovary is normal.  Lt ovary is normal.  Both ovaries are only seen abdominally and appear normal  No free fluid noted in the pelvis. [facility]  Name: Re Jiménez Patient ID: [id number]  Date: 06/15/2022 Perf. Physician: Dr. Cathleen Zelaya MD Sonographer: Shelton Meadows RDMS         Assessment       Diagnosis Orders   1. Menorrhagia with regular cycle  Type and Screen    CBC with Auto Differential    Transferrin Saturation    Ferritin     Specialty Problems     None          Plan   1. Cbc and iron panel today  Discussed optiosn, with reported cont heavy bleeding, declines hyst, will try change to megace, 80 bid  No further asa   Motrin 800 tid  Encourage fluids  dw pt issues with recurrent dx, only reason to not proceed with hyst would be fertility, issues at 43 yo with current dx/and recurrence, and safer option wotul be to proceed with definitive surgery    After visit, h/h returened again severely anemic/also noted as expected iron def.    Pt to er to eval, anticiapte transfusion   Iron infusion either inpt or outpt   May be worth adding lupron or equal to current therapy/megace 80 bid            Sasha Clifford MD  [facility]  [address]   [address] [facility] [address]  Office : [phone]  Fax : [phone]

## 2022-07-06 ENCOUNTER — HOSPITAL ENCOUNTER (OUTPATIENT)
Dept: LAB | Age: 37
Discharge: HOME | End: 2022-07-09
Payer: COMMERCIAL

## 2022-07-06 ENCOUNTER — CLINICAL DOCUMENTATION (OUTPATIENT)
Dept: ONCOLOGY | Age: 37
End: 2022-07-06

## 2022-07-06 ENCOUNTER — APPOINTMENT (OUTPATIENT)
Dept: ULTRASOUND IMAGING | Age: 37
DRG: 812 | End: 2022-07-06
Payer: COMMERCIAL

## 2022-07-06 ENCOUNTER — OFFICE VISIT (OUTPATIENT)
Dept: ONCOLOGY | Age: 37
End: 2022-07-06
Payer: COMMERCIAL

## 2022-07-06 ENCOUNTER — HOSPITAL ENCOUNTER (INPATIENT)
Age: 37
LOS: 1 days | Discharge: HOME | DRG: 812 | End: 2022-07-07
Attending: EMERGENCY MEDICINE
Payer: COMMERCIAL

## 2022-07-06 VITALS
SYSTOLIC BLOOD PRESSURE: 116 MMHG | BODY MASS INDEX: 39.74 KG/M2 | HEART RATE: 99 BPM | OXYGEN SATURATION: 100 % | HEIGHT: 63 IN | RESPIRATION RATE: 16 BRPM | TEMPERATURE: 100 F | DIASTOLIC BLOOD PRESSURE: 76 MMHG | WEIGHT: 224.3 LBS

## 2022-07-06 DIAGNOSIS — N92.0 MENORRHAGIA WITH REGULAR CYCLE: Primary | ICD-10-CM

## 2022-07-06 DIAGNOSIS — N93.9 ABNORMAL VAGINAL BLEEDING: Primary | ICD-10-CM

## 2022-07-06 DIAGNOSIS — R55 SYNCOPE AND COLLAPSE: ICD-10-CM

## 2022-07-06 DIAGNOSIS — N92.0 MENORRHAGIA WITH REGULAR CYCLE: ICD-10-CM

## 2022-07-06 DIAGNOSIS — D50.0 IRON DEFICIENCY ANEMIA DUE TO CHRONIC BLOOD LOSS: ICD-10-CM

## 2022-07-06 PROBLEM — E66.01 CLASS 2 SEVERE OBESITY DUE TO EXCESS CALORIES WITH SERIOUS COMORBIDITY AND BODY MASS INDEX (BMI) OF 39.0 TO 39.9 IN ADULT (HCC): Status: ACTIVE | Noted: 2022-07-06

## 2022-07-06 PROBLEM — E66.812 CLASS 2 SEVERE OBESITY DUE TO EXCESS CALORIES WITH SERIOUS COMORBIDITY AND BODY MASS INDEX (BMI) OF 39.0 TO 39.9 IN ADULT: Status: ACTIVE | Noted: 2022-07-06

## 2022-07-06 LAB
ALBUMIN SERPL-MCNC: 3.1 G/DL (ref 3.5–5)
ALBUMIN/GLOB SERPL: 0.7 {RATIO} (ref 1.2–3.5)
ALP SERPL-CCNC: 97 U/L (ref 50–136)
ALT SERPL-CCNC: 13 U/L (ref 12–65)
ANION GAP SERPL CALC-SCNC: 8 MMOL/L (ref 7–16)
AST SERPL-CCNC: 11 U/L (ref 15–37)
BASOPHILS # BLD: 0 K/UL (ref 0–0.2)
BASOPHILS # BLD: 0 K/UL (ref 0–0.2)
BASOPHILS NFR BLD: 0 % (ref 0–2)
BASOPHILS NFR BLD: 0 % (ref 0–2)
BILIRUB SERPL-MCNC: 0.3 MG/DL (ref 0.2–1.1)
BUN SERPL-MCNC: 13 MG/DL (ref 6–23)
CALCIUM SERPL-MCNC: 9.1 MG/DL (ref 8.3–10.4)
CHLORIDE SERPL-SCNC: 104 MMOL/L (ref 98–107)
CO2 SERPL-SCNC: 24 MMOL/L (ref 21–32)
CREAT SERPL-MCNC: 1 MG/DL (ref 0.6–1)
DIFFERENTIAL METHOD BLD: ABNORMAL
DIFFERENTIAL METHOD BLD: ABNORMAL
EOSINOPHIL # BLD: 0 K/UL (ref 0–0.8)
EOSINOPHIL # BLD: 0.1 K/UL (ref 0–0.8)
EOSINOPHIL NFR BLD: 0 % (ref 0.5–7.8)
EOSINOPHIL NFR BLD: 1 % (ref 0.5–7.8)
ERYTHROCYTE [DISTWIDTH] IN BLOOD BY AUTOMATED COUNT: 24.2 % (ref 11.9–14.6)
ERYTHROCYTE [DISTWIDTH] IN BLOOD BY AUTOMATED COUNT: 24.6 % (ref 11.9–14.6)
FERRITIN SERPL-MCNC: 5 NG/ML (ref 8–388)
GLOBULIN SER CALC-MCNC: 4.3 G/DL (ref 2.3–3.5)
GLUCOSE BLD STRIP.AUTO-MCNC: 184 MG/DL (ref 65–100)
GLUCOSE SERPL-MCNC: 152 MG/DL (ref 65–100)
HCT VFR BLD AUTO: 15.3 % (ref 35.8–46.3)
HCT VFR BLD AUTO: 15.6 % (ref 35.8–46.3)
HGB BLD-MCNC: 4 G/DL (ref 11.7–15.4)
HGB BLD-MCNC: 4.2 G/DL (ref 11.7–15.4)
HISTORY CHECK: NORMAL
IMM GRANULOCYTES # BLD AUTO: 0.1 K/UL (ref 0–0.5)
IMM GRANULOCYTES # BLD AUTO: 0.1 K/UL (ref 0–0.5)
IMM GRANULOCYTES NFR BLD AUTO: 1 % (ref 0–5)
IMM GRANULOCYTES NFR BLD AUTO: 1 % (ref 0–5)
IRON SATN MFR SERPL: 4 %
IRON SERPL-MCNC: 11 UG/DL (ref 35–150)
LYMPHOCYTES # BLD: 1.6 K/UL (ref 0.5–4.6)
LYMPHOCYTES # BLD: 2.5 K/UL (ref 0.5–4.6)
LYMPHOCYTES NFR BLD: 17 % (ref 13–44)
LYMPHOCYTES NFR BLD: 23 % (ref 13–44)
MAGNESIUM SERPL-MCNC: 2.5 MG/DL (ref 1.8–2.4)
MCH RBC QN AUTO: 19.3 PG (ref 26.1–32.9)
MCH RBC QN AUTO: 20.2 PG (ref 26.1–32.9)
MCHC RBC AUTO-ENTMCNC: 26.1 G/DL (ref 31.4–35)
MCHC RBC AUTO-ENTMCNC: 26.9 G/DL (ref 31.4–35)
MCV RBC AUTO: 73.9 FL (ref 79.6–97.8)
MCV RBC AUTO: 75 FL (ref 79.6–97.8)
MONOCYTES # BLD: 0.5 K/UL (ref 0.1–1.3)
MONOCYTES # BLD: 0.6 K/UL (ref 0.1–1.3)
MONOCYTES NFR BLD: 5 % (ref 4–12)
MONOCYTES NFR BLD: 6 % (ref 4–12)
NEUTS SEG # BLD: 7.3 K/UL (ref 1.7–8.2)
NEUTS SEG # BLD: 7.5 K/UL (ref 1.7–8.2)
NEUTS SEG NFR BLD: 70 % (ref 43–78)
NEUTS SEG NFR BLD: 76 % (ref 43–78)
NRBC # BLD: 0.23 K/UL (ref 0–0.2)
NRBC # BLD: 0.3 K/UL (ref 0–0.2)
PLATELET # BLD AUTO: 772 K/UL (ref 150–450)
PLATELET # BLD AUTO: 898 K/UL (ref 150–450)
PLATELET COMMENT: ABNORMAL
PMV BLD AUTO: 10.1 FL (ref 9.4–12.3)
PMV BLD AUTO: 10.6 FL (ref 9.4–12.3)
POTASSIUM SERPL-SCNC: 4.1 MMOL/L (ref 3.5–5.1)
PROT SERPL-MCNC: 7.4 G/DL (ref 6.3–8.2)
RBC # BLD AUTO: 2.07 M/UL (ref 4.05–5.2)
RBC # BLD AUTO: 2.08 M/UL (ref 4.05–5.2)
RBC MORPH BLD: ABNORMAL
SERVICE CMNT-IMP: ABNORMAL
SODIUM SERPL-SCNC: 136 MMOL/L (ref 136–145)
TIBC SERPL-MCNC: 292 UG/DL (ref 250–450)
WBC # BLD AUTO: 10.7 K/UL (ref 4.3–11.1)
WBC # BLD AUTO: 9.6 K/UL (ref 4.3–11.1)
WBC MORPH BLD: ABNORMAL

## 2022-07-06 PROCEDURE — 36415 COLL VENOUS BLD VENIPUNCTURE: CPT

## 2022-07-06 PROCEDURE — 83540 ASSAY OF IRON: CPT

## 2022-07-06 PROCEDURE — 80053 COMPREHEN METABOLIC PANEL: CPT

## 2022-07-06 PROCEDURE — 86901 BLOOD TYPING SEROLOGIC RH(D): CPT

## 2022-07-06 PROCEDURE — 93005 ELECTROCARDIOGRAM TRACING: CPT | Performed by: EMERGENCY MEDICINE

## 2022-07-06 PROCEDURE — 86920 COMPATIBILITY TEST SPIN: CPT

## 2022-07-06 PROCEDURE — 86923 COMPATIBILITY TEST ELECTRIC: CPT

## 2022-07-06 PROCEDURE — 36430 TRANSFUSION BLD/BLD COMPNT: CPT

## 2022-07-06 PROCEDURE — 82728 ASSAY OF FERRITIN: CPT

## 2022-07-06 PROCEDURE — 83735 ASSAY OF MAGNESIUM: CPT

## 2022-07-06 PROCEDURE — P9016 RBC LEUKOCYTES REDUCED: HCPCS

## 2022-07-06 PROCEDURE — 82962 GLUCOSE BLOOD TEST: CPT

## 2022-07-06 PROCEDURE — 1100000000 HC RM PRIVATE

## 2022-07-06 PROCEDURE — 76856 US EXAM PELVIC COMPLETE: CPT

## 2022-07-06 PROCEDURE — 85025 COMPLETE CBC W/AUTO DIFF WBC: CPT

## 2022-07-06 PROCEDURE — 99285 EMERGENCY DEPT VISIT HI MDM: CPT

## 2022-07-06 PROCEDURE — 99204 OFFICE O/P NEW MOD 45 MIN: CPT | Performed by: OBSTETRICS & GYNECOLOGY

## 2022-07-06 RX ORDER — ONDANSETRON 2 MG/ML
4 INJECTION INTRAMUSCULAR; INTRAVENOUS EVERY 6 HOURS PRN
Status: DISCONTINUED | OUTPATIENT
Start: 2022-07-06 | End: 2022-07-08 | Stop reason: HOSPADM

## 2022-07-06 RX ORDER — DOXYCYCLINE HYCLATE 50 MG/1
324 CAPSULE, GELATIN COATED ORAL
Status: DISCONTINUED | OUTPATIENT
Start: 2022-07-07 | End: 2022-07-08 | Stop reason: HOSPADM

## 2022-07-06 RX ORDER — SODIUM CHLORIDE 9 MG/ML
INJECTION, SOLUTION INTRAVENOUS PRN
Status: DISCONTINUED | OUTPATIENT
Start: 2022-07-06 | End: 2022-07-08 | Stop reason: HOSPADM

## 2022-07-06 RX ORDER — MEGESTROL ACETATE 40 MG/1
80 TABLET ORAL
Status: DISCONTINUED | OUTPATIENT
Start: 2022-07-07 | End: 2022-07-07 | Stop reason: DRUGHIGH

## 2022-07-06 RX ORDER — POTASSIUM CHLORIDE 7.45 MG/ML
10 INJECTION INTRAVENOUS PRN
Status: DISCONTINUED | OUTPATIENT
Start: 2022-07-06 | End: 2022-07-08 | Stop reason: HOSPADM

## 2022-07-06 RX ORDER — MAGNESIUM SULFATE IN WATER 40 MG/ML
2000 INJECTION, SOLUTION INTRAVENOUS PRN
Status: DISCONTINUED | OUTPATIENT
Start: 2022-07-06 | End: 2022-07-08 | Stop reason: HOSPADM

## 2022-07-06 RX ORDER — MEDROXYPROGESTERONE ACETATE 10 MG/1
10 TABLET ORAL 2 TIMES DAILY
Status: DISCONTINUED | OUTPATIENT
Start: 2022-07-06 | End: 2022-07-07

## 2022-07-06 RX ORDER — ONDANSETRON 4 MG/1
4 TABLET, ORALLY DISINTEGRATING ORAL EVERY 8 HOURS PRN
Status: DISCONTINUED | OUTPATIENT
Start: 2022-07-06 | End: 2022-07-08 | Stop reason: HOSPADM

## 2022-07-06 RX ORDER — MEGESTROL ACETATE 40 MG/1
80 TABLET ORAL 2 TIMES DAILY
Status: DISCONTINUED | OUTPATIENT
Start: 2022-07-06 | End: 2022-07-06

## 2022-07-06 RX ORDER — IBUPROFEN 800 MG/1
800 TABLET ORAL EVERY 8 HOURS PRN
Qty: 21 TABLET | Refills: 0 | Status: SHIPPED | OUTPATIENT
Start: 2022-07-06 | End: 2022-09-30

## 2022-07-06 RX ORDER — POTASSIUM CHLORIDE 20 MEQ/1
40 TABLET, EXTENDED RELEASE ORAL PRN
Status: DISCONTINUED | OUTPATIENT
Start: 2022-07-06 | End: 2022-07-08 | Stop reason: HOSPADM

## 2022-07-06 RX ORDER — MEGESTROL ACETATE 40 MG/1
80 TABLET ORAL 2 TIMES DAILY
Qty: 120 TABLET | Refills: 3 | Status: SHIPPED | OUTPATIENT
Start: 2022-07-06

## 2022-07-06 ASSESSMENT — ENCOUNTER SYMPTOMS
RESPIRATORY NEGATIVE: 1
APNEA: 0
DIARRHEA: 0
VOMITING: 0
STRIDOR: 0
SHORTNESS OF BREATH: 0
COLOR CHANGE: 0
EYES NEGATIVE: 1
ABDOMINAL PAIN: 0
GASTROINTESTINAL NEGATIVE: 1
PHOTOPHOBIA: 0
WHEEZING: 0
NAUSEA: 0
CHEST TIGHTNESS: 0
ALLERGIC/IMMUNOLOGIC NEGATIVE: 1
COUGH: 0
ABDOMINAL DISTENTION: 0

## 2022-07-06 ASSESSMENT — PATIENT HEALTH QUESTIONNAIRE - PHQ9
1. LITTLE INTEREST OR PLEASURE IN DOING THINGS: 0
2. FEELING DOWN, DEPRESSED OR HOPELESS: 0
SUM OF ALL RESPONSES TO PHQ QUESTIONS 1-9: 0
SUM OF ALL RESPONSES TO PHQ9 QUESTIONS 1 & 2: 0
SUM OF ALL RESPONSES TO PHQ QUESTIONS 1-9: 0

## 2022-07-06 ASSESSMENT — PAIN - FUNCTIONAL ASSESSMENT
PAIN_FUNCTIONAL_ASSESSMENT: NONE - DENIES PAIN
PAIN_FUNCTIONAL_ASSESSMENT: NONE - DENIES PAIN

## 2022-07-06 ASSESSMENT — PAIN SCALES - GENERAL: PAINLEVEL_OUTOF10: 2

## 2022-07-06 NOTE — PATIENT INSTRUCTIONS
Patient Instructions from Today's Visit    Reason for Visit:  New Patient    Diagnosis Information:  [email or web address]      Plan:  Continue your Provera  Hysterectomy is recommended once you are done having children    Follow Up:      Recent Lab Results:  n/a    Treatment Summary has been discussed and given to patient: n/a        -------------------------------------------------------------------------------------------------------------------     Patient does express an interest in My Chart. My Chart log in information explained on the after visit summary printout at the [facility] [address] desk.     Robert Fletcher, RN, MSN, OCN  Gynecology Nurse Navigator for Dr. Mike Angeles  [address] [place] [address]  Phone:  [phone]  Fax: [phone]  Email: [email or web address]

## 2022-07-06 NOTE — CONSENT
Informed Consent for Blood Component Transfusion Note    I have discussed with the  the rationale for blood component transfusion; its benefits in treating or preventing fatigue, organ damage, or death; and its risk which includes mild transfusion reactions, rare risk of blood borne infection, or more serious but rare reactions. I have discussed the alternatives to transfusion, including the risk and consequences of not receiving transfusion. The  had an opportunity to ask questions and had agreed to proceed with transfusion of blood components.     Electronically signed by So Holguin MD on 7/6/22 at 5:08 PM EDT

## 2022-07-06 NOTE — ED PROVIDER NOTES
[employer] Emergency Department Provider Note                   PCP:                None Provider               Age: 44 y.o. Sex: female     Diagnosis anemia syncope    DISPOSITION  Admit        MDM  Number of Diagnoses or Management Options  Abnormal vaginal bleeding  Iron deficiency anemia due to chronic blood loss  Syncope and collapse  Diagnosis management comments: Syncopal episode and extremely low hemoglobin I went ahead and ordered 2 units to be transfused. Plan to admit patient for the anemia. Amount and/or Complexity of Data Reviewed  Clinical lab tests: ordered and reviewed         Orders Placed This Encounter   Procedures    CBC with Auto Differential    Comprehensive Metabolic Panel    Magnesium    Hemoglobin and Hematocrit    Verify hospital blood product consent form has been signed and witnessed    Vital Signs For Blood Product Transfusion    Transfusion Reaction Management    EKG 12 Lead    TYPE AND SCREEN    TYPE AND SCREEN    PREPARE RBC (CROSSMATCH), 2 Units        Leandro Fields is a 44 y.o. female who presents to the Emergency Department with chief complaint of    Chief Complaint   Patient presents with    Vaginal Bleeding      She has a longstanding history of vaginal bleeding. She did have a D&C done weeks ago and required a blood transfusion following this. That was her only blood fusion she had ever received in her life. She followed up with Dr. Rodri Rivera today and had blood work done and her hemoglobin was found to be 4. She was advised to come in here immediately. While in the waiting room she went to the bathroom and had episode where she lost consciousness. She had brief shaking.  pulled the alarm in the bathroom. And she was immediately taken to her room. Within a few seconds of my arrival she was able to tell me her name and squeeze my hands on both sides. Within a few more minutes she was more awake.   She was not hypoglycemic and by the time we put her on the monitor blood pressure was okay as were the rest of her vital signs. Review of Systems   Constitutional: Negative for activity change, appetite change, chills, fatigue and fever. Eyes: Negative for photophobia and visual disturbance. Respiratory: Negative for apnea, cough, chest tightness, shortness of breath, wheezing and stridor. Cardiovascular: Negative for chest pain, palpitations and leg swelling. Gastrointestinal: Negative for abdominal distention, abdominal pain, diarrhea, nausea and vomiting. Musculoskeletal: Negative for arthralgias, neck pain and neck stiffness. Skin: Negative for color change, pallor and rash. All other systems reviewed and are negative. Past Medical History:   Diagnosis Date    Fibroid     Iron deficiency anemia     Thickened endometrium         Past Surgical History:   Procedure Laterality Date    APPENDECTOMY      CHOLECYSTECTOMY      DILATION AND CURETTAGE OF UTERUS      DILATION AND CURETTAGE OF UTERUS N/A 6/15/2022    DILATATION AND CURETTAGE HYSTEROSCOPY performed by Brett [facility]elaria MD at [facility]        No family history on file. Social Connections:     Frequency of Communication with Friends and Family: Not on file    Frequency of Social Gatherings with Friends and Family: Not on file    Attends Jehovah's witness Services: Not on file    Active Member of Clubs or Organizations: Not on file    Attends Club or Organization Meetings: Not on file    Marital Status: Not on file        No Known Allergies     Vitals signs and nursing note reviewed. Patient Vitals for the past 4 hrs:   Temp Pulse Resp BP SpO2   07/06/22 1630 99.1 °F (37.3 °C) (!) 101 18 127/75 100 %          Physical Exam  Vitals and nursing note reviewed. Constitutional:       General: She is not in acute distress. Appearance: Normal appearance. She is not ill-appearing, toxic-appearing or diaphoretic.    HENT:      Head: Normocephalic and atraumatic. Eyes:      General: No scleral icterus. Conjunctiva/sclera: Conjunctivae normal.   Cardiovascular:      Rate and Rhythm: Normal rate and regular rhythm. Pulmonary:      Effort: Pulmonary effort is normal. No respiratory distress. Breath sounds: No stridor. No wheezing, rhonchi or rales. Abdominal:      Palpations: Abdomen is soft. Tenderness: There is no abdominal tenderness. There is no guarding or rebound. Hernia: No hernia is present. Musculoskeletal:      Cervical back: Normal range of motion and neck supple. Skin:     Capillary Refill: Capillary refill takes less than 2 seconds. Neurological:      General: No focal deficit present. Mental Status: She is alert and oriented to person, place, and time. Mental status is at baseline. Comments: Initially somnolent subsequently more awake and responsive. Psychiatric:         Mood and Affect: Mood normal.         Behavior: Behavior normal.          Procedures      Labs Reviewed   CBC WITH AUTO DIFFERENTIAL   COMPREHENSIVE METABOLIC PANEL   MAGNESIUM   TYPE AND SCREEN   TYPE AND SCREEN   PREPARE RBC (CROSSMATCH)        No orders to display                  Critical care time: 40 minutes of critical care time was performed in the emergency department. This was separate from any other procedures listed during the patients emergency department course. The failure to initiate these interventions on an urgent basis would likely have resulted in sudden, clinically significant or life-threatening deterioration in the patients condition. Voice dictation software was used during the making of this note. This software is not perfect and grammatical and other typographical errors may be present. This note has not been completely proofread for errors.      Abril León MD  07/06/22 Taiwo Campos MD  07/06/22 [id number]

## 2022-07-06 NOTE — H&P
Hospitalist History and Physical   Admit Date:  2022  4:52 PM   Name:  Mere Oates   Age:  44 y.o. Sex:  female  :  1985   MRN:  [id number]   Room:  ER02/02    Presenting Complaint: Vaginal Bleeding     Reason(s) for Admission: Syncope and collapse [R55]     History of Present Illness: Mere Oates is a 44 y.o. female with medical history of uncontrolled vaginal bleeding status post D&C who presented with continual bleeding since her D&C. Upon presentation her hemoglobin was 4.0. She had her D&C weeks ago and has had continuous bleeding since that time. She had a follow-up appointment with gynecology and was referred to the emergency department. While waiting for triage she passed out in the bathroom. She briefly lost consciousness but was quickly aroused and able to provide relevant and accurate answers to questions. She reports she had 1 son is 13 and healthy. Her sister has endometriosis. She has had intermittent difficulty with vaginal bleeding for years. She reports headache. Denies vision changes, chest pain, shortness of breath, abdominal pain, nausea, vomiting, diarrhea, changes in urine, peripheral edema. Aside from the severe anemia, her labs look relatively good. Blood transfusion was initiated in the emergency department and continue on upon admission. Review of Systems:  10 systems reviewed and negative except as noted in HPI. Assessment & Plan:   Syncope and collapse  Episode of syncope in the emergency department upon arrival in the setting of severe anemia  -Resuscitate and reevaluate  -Orthostatic blood pressure after transfusion    Menorrhagia with regular cycle  Chronic, has been seeing gynecology, recent D&C without resolution  -Consult gynecology  -megestrol 80 tid per gyn note  -Provera  -Ultrasound pelvis    Anemia due to chronic blood loss  Admission Hgb 4.0.   Transfuse pRBC 2U   -Transfuse with Hgb below 7  -Iron supplementation    Severe obesity with BMI 39  Increased risk of all cause mortality, complicating care  - healthy lifestyle at discharge    Patient is critically ill. Without intervention, there is a high probability of acute organ impairment or life-threatening deterioration in the patient's condition from: symptomatic blood loss anemia, syncope and collapse  Critical care interventions: blood transfusion  Total critical care time spent: 41 minutes. Time is indicative of direct patient attendance at bedside and on the patient's floor nearby. Includes time spent at bedside performing history and exam, performing chart review, discussing findings and treatment plan with patient and/or family, discussing patient with nursing staff, consultants and colleagues, and ordering/reviewing pertinent laboratory and radiographic evaluations. Time excludes procedures. CPT:  81720: First 30-74 minutes  47667: Each block of 30 min. beyond 76          Dispo/Discharge Planning:   Pending clinical improvement    Diet: Diet NPO  VTE ppx: Currently bleeding  Code status: Full Code    Hospital Problems:  Principal Problem:    Syncope and collapse  Active Problems:    Menorrhagia with regular cycle    Anemia due to chronic blood loss    Class 2 severe obesity due to excess calories with serious comorbidity and body mass index (BMI) of 39.0 to 39.9 in [facility])  Resolved Problems:    * No resolved hospital problems.  *       Past History:     Past Medical History:   Diagnosis Date    Fibroid     Iron deficiency anemia     Menorrhagia 2016    Thickened endometrium      Past Surgical History:   Procedure Laterality Date    APPENDECTOMY      CHOLECYSTECTOMY      DILATION AND CURETTAGE OF UTERUS      DILATION AND CURETTAGE OF UTERUS N/A 6/15/2022    DILATATION AND CURETTAGE HYSTEROSCOPY performed by Gertrudis Mccrary MD at [facility]      Social History     Tobacco Use    Smoking status: Never Smoker    Smokeless tobacco: Never Used   Substance Use Topics    Alcohol use: Not Currently      Social History     Substance and Sexual Activity   Drug Use Never     Family History   Problem Relation Age of Onset    Endometriosis Sister       Family history reviewed and negative except as noted above. There is no immunization history on file for this patient. No Known Allergies  Prior to Admit Medications:  Current Outpatient Medications   Medication Instructions    ferrous gluconate (FERGON) 324 mg, Oral, DAILY WITH BREAKFAST    Ferrous Gluconate-C-Folic Acid (IRON-C PO) Oral    ibuprofen (ADVIL;MOTRIN) 600 mg, Oral, 4 TIMES DAILY PRN    ibuprofen (ADVIL;MOTRIN) 800 mg, Oral, EVERY 8 HOURS PRN    medroxyPROGESTERone (PROVERA) 10 mg, Oral, 2 times daily    megestrol (MEGACE) 80 mg, Oral, 2 TIMES DAILY         Objective:     Patient Vitals for the past 24 hrs:   Temp Pulse Resp BP SpO2   07/06/22 2145 99.8 °F (37.7 °C) 92 18 133/76 99 %   07/06/22 2128 99.9 °F (37.7 °C) 95 18 126/78 100 %   07/06/22 1905 98.5 °F (36.9 °C) 97 18 (!) 99/43 100 %   07/06/22 1851 98.6 °F (37 °C) 100 20 114/71 100 %   07/06/22 1842 -- 95 21 114/71 100 %   07/06/22 1832 -- 95 17 (!) 109/57 100 %   07/06/22 1822 -- 98 24 (!) 99/54 100 %   07/06/22 1802 -- 95 19 113/69 100 %   07/06/22 1630 99.1 °F (37.3 °C) (!) 101 18 127/75 100 %       Oxygen Therapy  SpO2: 99 %  O2 Device: None (Room air)    Estimated body mass index is 39.68 kg/m² as calculated from the following:    Height as of this encounter: 5' 3\" (1.6 m). Weight as of this encounter: 224 lb (101.6 kg). No intake or output data in the 24 hours ending 07/06/22 2323      Blood pressure 133/76, pulse 92, temperature 99.8 °F (37.7 °C), temperature source Oral, resp. rate 18, height 5' 3\" (1.6 m), weight 224 lb (101.6 kg), last menstrual period 06/09/2022, SpO2 99 %. Physical Exam  Vitals and nursing note reviewed. Constitutional:       General: She is not in acute distress. Appearance: She is obese.  She is ill-appearing. She is not diaphoretic. Eyes:      Extraocular Movements: Extraocular movements intact. Cardiovascular:      Rate and Rhythm: Normal rate and regular rhythm. Pulmonary:      Effort: Pulmonary effort is normal. No respiratory distress. Abdominal:      General: There is no distension. Genitourinary:     Comments: Vaginal bleeding, pelvic exam deferred to GYN  Musculoskeletal:         General: No deformity. Right lower leg: No edema. Left lower leg: No edema. Skin:     Coloration: Skin is pale. Skin is not jaundiced. Neurological:      General: No focal deficit present. Mental Status: She is alert and oriented to person, place, and time.    Psychiatric:         Mood and Affect: Mood normal.         Behavior: Behavior normal.         I have reviewed ordered lab tests and independently visualized imaging below:    Last 24hr Labs:  Recent Results (from the past 24 hour(s))   CBC with Auto Differential    Collection Time: 07/06/22  2:57 PM   Result Value Ref Range    WBC 9.6 4.3 - 11.1 K/uL    RBC 2.08 (L) 4.05 - 5.2 M/uL    Hemoglobin 4.2 (LL) 11.7 - 15.4 g/dL    Hematocrit 15.6 (L) 35.8 - 46.3 %    MCV 75.0 (L) 79.6 - 97.8 FL    MCH 20.2 (L) 26.1 - 32.9 PG    MCHC 26.9 (L) 31.4 - 35.0 g/dL    RDW 24.2 (H) 11.9 - 14.6 %    Platelets 943 (H) 879 - 450 K/uL    MPV 10.1 9.4 - 12.3 FL    nRBC 0.23 (H) 0.0 - 0.2 K/uL    Differential Type AUTOMATED      Seg Neutrophils 76 43 - 78 %    Lymphocytes 17 13 - 44 %    Monocytes 5 4.0 - 12.0 %    Eosinophils % 1 0.5 - 7.8 %    Basophils 0 0.0 - 2.0 %    Immature Granulocytes 1 0.0 - 5.0 %    Segs Absolute 7.3 1.7 - 8.2 K/UL    Absolute Lymph # 1.6 0.5 - 4.6 K/UL    Absolute Mono # 0.5 0.1 - 1.3 K/UL    Absolute Eos # 0.1 0.0 - 0.8 K/UL    Basophils Absolute 0.0 0.0 - 0.2 K/UL    Absolute Immature Granulocyte 0.1 0.0 - 0.5 K/UL   Transferrin Saturation    Collection Time: 07/06/22  2:57 PM   Result Value Ref Range    Iron 11 (L) 35 - 150 ug/dL Immature Granulocytes 1 0.0 - 5.0 %    Segs Absolute 7.5 1.7 - 8.2 K/UL    Absolute Lymph # 2.5 0.5 - 4.6 K/UL    Absolute Mono # 0.6 0.1 - 1.3 K/UL    Absolute Eos # 0.0 0.0 - 0.8 K/UL    Basophils Absolute 0.0 0.0 - 0.2 K/UL    Absolute Immature Granulocyte 0.1 0.0 - 0.5 K/UL    RBC Comment ANISOCYTOSIS + POIKILOCYTOSIS      RBC Comment MARKED  HYPOCHROMIA        RBC Comment SLIGHT  POLYCHROMASIA        RBC Comment MODERATE  OVALOCYTES        RBC Comment OCCASIONAL  TEARDROP CELLS        RBC Comment SLIGHT  STOMATOCYTES        RBC Comment SLIGHT  MACROCYTOSIS        RBC Comment OCCASIONAL  TARGET CELLS        WBC Comment Result Confirmed By Smear      Platelet Comment MARKED      Differential Type AUTOMATED     Comprehensive Metabolic Panel    Collection Time: 07/06/22  5:16 PM   Result Value Ref Range    Sodium 136 136 - 145 mmol/L    Potassium 4.1 3.5 - 5.1 mmol/L    Chloride 104 98 - 107 mmol/L    CO2 24 21 - 32 mmol/L    Anion Gap 8 7 - 16 mmol/L    Glucose 152 (H) 65 - 100 mg/dL    BUN 13 6 - 23 MG/DL    CREATININE 1.00 0.6 - 1.0 MG/DL    GFR African American >60 >60 ml/min/1.73m2    GFR Non- >60 >60 ml/min/1.73m2    Calcium 9.1 8.3 - 10.4 MG/DL    Total Bilirubin 0.3 0.2 - 1.1 MG/DL    ALT 13 12 - 65 U/L    AST 11 (L) 15 - 37 U/L    Alk Phosphatase 97 50 - 136 U/L    Total Protein 7.4 6.3 - 8.2 g/dL    Albumin 3.1 (L) 3.5 - 5.0 g/dL    Globulin 4.3 (H) 2.3 - 3.5 g/dL    Albumin/Globulin Ratio 0.7 (L) 1.2 - 3.5     Magnesium    Collection Time: 07/06/22  5:16 PM   Result Value Ref Range    Magnesium 2.5 (H) 1.8 - 2.4 mg/dL       Other Studies:  No results found. Echocardiogram:  No results found for this or any previous visit.       Meds previously ordered:  Orders Placed This Encounter   Medications    0.9 % sodium chloride infusion    ferrous gluconate (FERGON) tablet 324 mg    medroxyPROGESTERone (PROVERA) tablet 10 mg    megestrol (MEGACE) tablet 80 mg    OR Linked Order Group    [place] ondansetron (ZOFRAN-ODT) disintegrating tablet 4 mg     ondansetron (ZOFRAN) injection 4 mg    OR Linked Order Group     potassium chloride (KLOR-CON M) extended release tablet 40 mEq     potassium bicarb-citric acid (EFFER-K) effervescent tablet 40 mEq     potassium chloride 10 mEq/100 mL IVPB (Peripheral Line)    magnesium sulfate 2000 mg in 50 mL IVPB premix    OR Linked Order Group     sodium phosphate 10 mmol in sodium chloride 0.9 % 250 mL IVPB     sodium phosphate 15 mmol in sodium chloride 0.9 % 250 mL IVPB     sodium phosphate 20 mmol in sodium chloride 0.9 % 500 mL IVPB     Signed:  Mian Moody MD

## 2022-07-06 NOTE — ED NOTES
Soo Henry in blood bank states that type and screen has not yet arrived via . Blood work collected and stickers used from bracelet on patient as requested by blood bank.       Aida Pastor RN  07/06/22 3728

## 2022-07-06 NOTE — PROGRESS NOTES
hgb 4.2    Read back and verified. I reported to Dr Rodri Rivera and she can go to the ER and get blood. Call to the patient and she will be on her way to [facility] ER as instructed.

## 2022-07-07 VITALS
RESPIRATION RATE: 16 BRPM | HEIGHT: 63 IN | TEMPERATURE: 98.2 F | BODY MASS INDEX: 39.69 KG/M2 | HEART RATE: 76 BPM | SYSTOLIC BLOOD PRESSURE: 130 MMHG | DIASTOLIC BLOOD PRESSURE: 76 MMHG | WEIGHT: 224 LBS | OXYGEN SATURATION: 100 %

## 2022-07-07 LAB
ALBUMIN SERPL-MCNC: 2.8 G/DL (ref 3.5–5)
ALBUMIN/GLOB SERPL: 0.8 {RATIO} (ref 1.2–3.5)
ALP SERPL-CCNC: 87 U/L (ref 50–136)
ALT SERPL-CCNC: 12 U/L (ref 12–65)
ANION GAP SERPL CALC-SCNC: 6 MMOL/L (ref 7–16)
AST SERPL-CCNC: 9 U/L (ref 15–37)
BASOPHILS # BLD: 0.1 K/UL (ref 0–0.2)
BASOPHILS NFR BLD: 1 % (ref 0–2)
BILIRUB SERPL-MCNC: 1.4 MG/DL (ref 0.2–1.1)
BUN SERPL-MCNC: 14 MG/DL (ref 6–23)
CALCIUM SERPL-MCNC: 8.6 MG/DL (ref 8.3–10.4)
CHLORIDE SERPL-SCNC: 105 MMOL/L (ref 98–107)
CO2 SERPL-SCNC: 27 MMOL/L (ref 21–32)
CREAT SERPL-MCNC: 1 MG/DL (ref 0.6–1)
DIFFERENTIAL METHOD BLD: ABNORMAL
EKG ATRIAL RATE: 81 BPM
EKG DIAGNOSIS: NORMAL
EKG P AXIS: 57 DEGREES
EKG P-R INTERVAL: 147 MS
EKG Q-T INTERVAL: 371 MS
EKG QRS DURATION: 87 MS
EKG QTC CALCULATION (BAZETT): 428 MS
EKG R AXIS: 39 DEGREES
EKG T AXIS: 44 DEGREES
EKG VENTRICULAR RATE: 80 BPM
EOSINOPHIL # BLD: 0 K/UL (ref 0–0.8)
EOSINOPHIL NFR BLD: 0 % (ref 0.5–7.8)
ERYTHROCYTE [DISTWIDTH] IN BLOOD BY AUTOMATED COUNT: 23.4 % (ref 11.9–14.6)
GLOBULIN SER CALC-MCNC: 3.7 G/DL (ref 2.3–3.5)
GLUCOSE SERPL-MCNC: 96 MG/DL (ref 65–100)
HCG UR QL: NEGATIVE
HCT VFR BLD AUTO: 20.3 % (ref 35.8–46.3)
HCT VFR BLD AUTO: 21.8 % (ref 35.8–46.3)
HCT VFR BLD AUTO: 32.6 % (ref 35.8–46.3)
HGB BLD-MCNC: 5.8 G/DL (ref 11.7–15.4)
HGB BLD-MCNC: 6.7 G/DL (ref 11.7–15.4)
HGB BLD-MCNC: 9.9 G/DL (ref 11.7–15.4)
HISTORY CHECK: NORMAL
HISTORY CHECK: NORMAL
IMM GRANULOCYTES # BLD AUTO: 0.1 K/UL (ref 0–0.5)
IMM GRANULOCYTES NFR BLD AUTO: 1 % (ref 0–5)
LYMPHOCYTES # BLD: 1.8 K/UL (ref 0.5–4.6)
LYMPHOCYTES NFR BLD: 18 % (ref 13–44)
MCH RBC QN AUTO: 24.5 PG (ref 26.1–32.9)
MCHC RBC AUTO-ENTMCNC: 30.7 G/DL (ref 31.4–35)
MCV RBC AUTO: 79.9 FL (ref 79.6–97.8)
MONOCYTES # BLD: 0.6 K/UL (ref 0.1–1.3)
MONOCYTES NFR BLD: 6 % (ref 4–12)
NEUTS SEG # BLD: 7.6 K/UL (ref 1.7–8.2)
NEUTS SEG NFR BLD: 74 % (ref 43–78)
NRBC # BLD: 0.31 K/UL (ref 0–0.2)
PHOSPHATE SERPL-MCNC: 4.3 MG/DL (ref 2.5–4.5)
PLATELET # BLD AUTO: 633 K/UL (ref 150–450)
PMV BLD AUTO: 10.7 FL (ref 9.4–12.3)
POTASSIUM SERPL-SCNC: 4 MMOL/L (ref 3.5–5.1)
PROT SERPL-MCNC: 6.5 G/DL (ref 6.3–8.2)
RBC # BLD AUTO: 2.73 M/UL (ref 4.05–5.2)
SODIUM SERPL-SCNC: 138 MMOL/L (ref 136–145)
WBC # BLD AUTO: 10.2 K/UL (ref 4.3–11.1)

## 2022-07-07 PROCEDURE — 80053 COMPREHEN METABOLIC PANEL: CPT

## 2022-07-07 PROCEDURE — 30233N1 TRANSFUSION OF NONAUTOLOGOUS RED BLOOD CELLS INTO PERIPHERAL VEIN, PERCUTANEOUS APPROACH: ICD-10-PCS | Performed by: INTERNAL MEDICINE

## 2022-07-07 PROCEDURE — 99231 SBSQ HOSP IP/OBS SF/LOW 25: CPT | Performed by: OBSTETRICS & GYNECOLOGY

## 2022-07-07 PROCEDURE — 97530 THERAPEUTIC ACTIVITIES: CPT

## 2022-07-07 PROCEDURE — 6370000000 HC RX 637 (ALT 250 FOR IP): Performed by: HOSPITALIST

## 2022-07-07 PROCEDURE — P9016 RBC LEUKOCYTES REDUCED: HCPCS

## 2022-07-07 PROCEDURE — 85025 COMPLETE CBC W/AUTO DIFF WBC: CPT

## 2022-07-07 PROCEDURE — 2580000003 HC RX 258: Performed by: INTERNAL MEDICINE

## 2022-07-07 PROCEDURE — 81025 URINE PREGNANCY TEST: CPT

## 2022-07-07 PROCEDURE — 97165 OT EVAL LOW COMPLEX 30 MIN: CPT

## 2022-07-07 PROCEDURE — 84100 ASSAY OF PHOSPHORUS: CPT

## 2022-07-07 PROCEDURE — 6360000002 HC RX W HCPCS: Performed by: OBSTETRICS & GYNECOLOGY

## 2022-07-07 PROCEDURE — 6360000002 HC RX W HCPCS: Performed by: INTERNAL MEDICINE

## 2022-07-07 PROCEDURE — 36415 COLL VENOUS BLD VENIPUNCTURE: CPT

## 2022-07-07 PROCEDURE — 85014 HEMATOCRIT: CPT

## 2022-07-07 PROCEDURE — 36430 TRANSFUSION BLD/BLD COMPNT: CPT

## 2022-07-07 PROCEDURE — 97161 PT EVAL LOW COMPLEX 20 MIN: CPT

## 2022-07-07 PROCEDURE — 6370000000 HC RX 637 (ALT 250 FOR IP): Performed by: FAMILY MEDICINE

## 2022-07-07 RX ORDER — SODIUM CHLORIDE 9 MG/ML
INJECTION, SOLUTION INTRAVENOUS PRN
Status: COMPLETED | OUTPATIENT
Start: 2022-07-07 | End: 2022-07-07

## 2022-07-07 RX ORDER — MEGESTROL ACETATE 40 MG/1
80 TABLET ORAL 2 TIMES DAILY
Status: DISCONTINUED | OUTPATIENT
Start: 2022-07-07 | End: 2022-07-08 | Stop reason: HOSPADM

## 2022-07-07 RX ORDER — SODIUM CHLORIDE 9 MG/ML
INJECTION, SOLUTION INTRAVENOUS PRN
Status: DISCONTINUED | OUTPATIENT
Start: 2022-07-07 | End: 2022-07-08 | Stop reason: HOSPADM

## 2022-07-07 RX ORDER — MORPHINE SULFATE 2 MG/ML
2 INJECTION, SOLUTION INTRAMUSCULAR; INTRAVENOUS ONCE
Status: COMPLETED | OUTPATIENT
Start: 2022-07-07 | End: 2022-07-07

## 2022-07-07 RX ADMIN — MEGESTROL ACETATE 80 MG: 40 TABLET ORAL at 09:10

## 2022-07-07 RX ADMIN — SODIUM CHLORIDE: 9 INJECTION, SOLUTION INTRAVENOUS at 11:09

## 2022-07-07 RX ADMIN — SODIUM CHLORIDE 250 MG: 9 INJECTION, SOLUTION INTRAVENOUS at 15:33

## 2022-07-07 RX ADMIN — MORPHINE SULFATE 2 MG: 2 INJECTION, SOLUTION INTRAMUSCULAR; INTRAVENOUS at 16:57

## 2022-07-07 RX ADMIN — MEGESTROL ACETATE 80 MG: 40 TABLET ORAL at 20:22

## 2022-07-07 RX ADMIN — FERROUS GLUCONATE 324 MG: 324 TABLET ORAL at 09:10

## 2022-07-07 RX ADMIN — LEUPROLIDE ACETATE 3.75 MG: KIT at 14:59

## 2022-07-07 ASSESSMENT — PAIN SCALES - GENERAL
PAINLEVEL_OUTOF10: 0
PAINLEVEL_OUTOF10: 6
PAINLEVEL_OUTOF10: 0
PAINLEVEL_OUTOF10: 0

## 2022-07-07 ASSESSMENT — ENCOUNTER SYMPTOMS
GASTROINTESTINAL NEGATIVE: 1
EYES NEGATIVE: 1
RESPIRATORY NEGATIVE: 1
ALLERGIC/IMMUNOLOGIC NEGATIVE: 1

## 2022-07-07 ASSESSMENT — PAIN DESCRIPTION - LOCATION: LOCATION: BACK

## 2022-07-07 NOTE — CONSULTS
Date: 7/7/2022        Patient Name: Sidney Long     YOB: 1985          Chief Complaint     Chief Complaint   Patient presents with    Vaginal Bleeding    severe anemia/chronic with acute, symptomatic    Iron deficiency    Fibroid and hyperplasia, with desired continued fertility    endometrial hyperplasia, atypical bx with dr douglas. Pt reports episode in past  Taking provera, 20 daily    Large fibroids    Obesity    Abnormal bleeding   Many months, at least since last fall   Pt reports has continued bleeding daily since d and c with gyn    Reported 2 untis prbc at procedure. Wishes to preserve fertility    Has used some asa during this recent bleeding episode      History of Present Illness   Sidney Long is a 44 y.o. who presents today for scheduled visit    Noted long hx abnormal bleeding, and about 2 years ago reports episode hyperplasia requireing tx. Increasing bleding over at least six moths, eventually d and c with gyn, complex hyperplasia  Also at that time recived 2 u prbc    Reports continued bleeding since d and c, no knonwn h/h with gyn, taking provera 20 daily as directed, with increasing fatigue. Past Medical History     Past Medical History:   Diagnosis Date    Fibroid     Iron deficiency anemia     Menorrhagia 2016    Thickened endometrium         Past Surgical History     Past Surgical History:   Procedure Laterality Date    APPENDECTOMY      CHOLECYSTECTOMY      DILATION AND CURETTAGE OF UTERUS      DILATION AND CURETTAGE OF UTERUS N/A 6/15/2022    DILATATION AND CURETTAGE HYSTEROSCOPY performed by José Luis Gutierrez MD at [address] [place] [address]        Medications    No current outpatient medications on file. Allergies   Patient has no known allergies.     Social History     Social History     Tobacco History     Smoking Status  Never Smoker    Smokeless Tobacco Use  Never Used          Alcohol History     Alcohol Use Status  Not Currently          Drug Use     Drug Use Status  Never          Sexual Activity     Sexually Active  Yes Partners  Male                Family History     Family History   Problem Relation Age of Onset    Endometriosis Sister        Review of Systems   Review of Systems   Constitutional: Positive for activity change and fatigue. HENT: Negative. Eyes: Negative. Respiratory: Negative. Cardiovascular: Negative. Gastrointestinal: Negative. Endocrine: Negative. Genitourinary: Positive for menstrual problem and vaginal bleeding. Musculoskeletal: Negative. Allergic/Immunologic: Negative. Neurological: Positive for light-headedness. Hematological: Negative. Psychiatric/Behavioral: Negative. All other systems reviewed and are negative. Physical Exam     ECOG PERFORMANCE STATUS - 2 - Ambulatory and capable of all selfcare but unable to carry out any work activities. Up and about more than 50% of waking hours. Blood pressure 129/76, pulse 75, temperature 99.1 °F (37.3 °C), resp. rate 19, height 5' 3\" (1.6 m), weight 224 lb (101.6 kg), last menstrual period 06/09/2022, SpO2 99 %. Physical Exam  Vitals and nursing note reviewed. Exam conducted with a chaperone present. Constitutional:       Appearance: Normal appearance. HENT:      Head: Normocephalic and atraumatic. Cardiovascular:      Rate and Rhythm: Normal rate and regular rhythm. Pulmonary:      Effort: Pulmonary effort is normal.      Breath sounds: Normal breath sounds. Abdominal:      General: Abdomen is flat. Palpations: Abdomen is soft. Neurological:      General: No focal deficit present. Mental Status: She is alert and oriented to person, place, and time. Psychiatric:         Mood and Affect: Mood normal.         Behavior: Behavior normal.         Thought Content:  Thought content normal.         Judgment: Judgment normal.         Labs        Recent Results (from the past 48 hour(s))   CBC with Auto Differential Collection Time: 07/06/22  2:57 PM   Result Value Ref Range    WBC 9.6 4.3 - 11.1 K/uL    RBC 2.08 (L) 4.05 - 5.2 M/uL    Hemoglobin 4.2 (LL) 11.7 - 15.4 g/dL    Hematocrit 15.6 (L) 35.8 - 46.3 %    MCV 75.0 (L) 79.6 - 97.8 FL    MCH 20.2 (L) 26.1 - 32.9 PG    MCHC 26.9 (L) 31.4 - 35.0 g/dL    RDW 24.2 (H) 11.9 - 14.6 %    Platelets 892 (H) 380 - 450 K/uL    MPV 10.1 9.4 - 12.3 FL    nRBC 0.23 (H) 0.0 - 0.2 K/uL    Differential Type AUTOMATED      Seg Neutrophils 76 43 - 78 %    Lymphocytes 17 13 - 44 %    Monocytes 5 4.0 - 12.0 %    Eosinophils % 1 0.5 - 7.8 %    Basophils 0 0.0 - 2.0 %    Immature Granulocytes 1 0.0 - 5.0 %    Segs Absolute 7.3 1.7 - 8.2 K/UL    Absolute Lymph # 1.6 0.5 - 4.6 K/UL    Absolute Mono # 0.5 0.1 - 1.3 K/UL    Absolute Eos # 0.1 0.0 - 0.8 K/UL    Basophils Absolute 0.0 0.0 - 0.2 K/UL    Absolute Immature Granulocyte 0.1 0.0 - 0.5 K/UL   Transferrin Saturation    Collection Time: 07/06/22  2:57 PM   Result Value Ref Range    Iron 11 (L) 35 - 150 ug/dL    TIBC 292 250 - 450 ug/dL    TRANSFERRIN SATURATION 4 (L) >20 %   Ferritin    Collection Time: 07/06/22  2:57 PM   Result Value Ref Range    Ferritin 5 (L) 8 - 388 NG/ML   POCT Glucose    Collection Time: 07/06/22  5:04 PM   Result Value Ref Range    POC Glucose 184 (H) 65 - 100 mg/dL    Performed by:  Sheila    EKG 12 Lead    Collection Time: 07/06/22  5:06 PM   Result Value Ref Range    Ventricular Rate 80 BPM    Atrial Rate 81 BPM    P-R Interval 147 ms    QRS Duration 87 ms    Q-T Interval 371 ms    QTc Calculation (Bazett) 428 ms    P Axis 57 degrees    R Axis 39 degrees    T Axis 44 degrees    Diagnosis Sinus rhythm    PREPARE RBC (CROSSMATCH), 2 Units    Collection Time: 07/06/22  5:15 PM   Result Value Ref Range    History Check Historical check performed    TYPE AND SCREEN    Collection Time: 07/06/22  5:15 PM   Result Value Ref Range    Crossmatch expiration date 07/09/2022,6636     ABO/Rh B POSITIVE     Antibody Screen NEG     Blood Bank Comment DORON MCCABE @ 9855 ON 7/6/22 THAT BLOOD IS READY MG      Unit Number [id number]     Product Code Blood Bank RC LR     Unit Divison 00     Dispense Status Blood Bank ISSUED     Crossmatch Result Compatible     Unit Number [id number]     Product Code Blood Bank RC LR     Unit Divison 00     Dispense Status Blood Bank ISSUED     Crossmatch Result Compatible     Unit Number [id number]     Product Code Blood Bank RC LR     Unit Divison 00     Dispense Status Blood Bank ISSUED     Crossmatch Result Compatible    CBC with Auto Differential    Collection Time: 07/06/22  5:16 PM   Result Value Ref Range    WBC 10.7 4.3 - 11.1 K/uL    RBC 2.07 (L) 4.05 - 5.2 M/uL    Hemoglobin 4.0 (LL) 11.7 - 15.4 g/dL    Hematocrit 15.3 (L) 35.8 - 46.3 %    MCV 73.9 (L) 79.6 - 97.8 FL    MCH 19.3 (L) 26.1 - 32.9 PG    MCHC 26.1 (L) 31.4 - 35.0 g/dL    RDW 24.6 (H) 11.9 - 14.6 %    Platelets 671 (H) 433 - 450 K/uL    MPV 10.6 9.4 - 12.3 FL    nRBC 0.30 (H) 0.0 - 0.2 K/uL    Seg Neutrophils 70 43 - 78 %    Lymphocytes 23 13 - 44 %    Monocytes 6 4.0 - 12.0 %    Eosinophils % 0 (L) 0.5 - 7.8 %    Basophils 0 0.0 - 2.0 %    Immature Granulocytes 1 0.0 - 5.0 %    Segs Absolute 7.5 1.7 - 8.2 K/UL    Absolute Lymph # 2.5 0.5 - 4.6 K/UL    Absolute Mono # 0.6 0.1 - 1.3 K/UL    Absolute Eos # 0.0 0.0 - 0.8 K/UL    Basophils Absolute 0.0 0.0 - 0.2 K/UL    Absolute Immature Granulocyte 0.1 0.0 - 0.5 K/UL    RBC Comment ANISOCYTOSIS + POIKILOCYTOSIS      RBC Comment MARKED  HYPOCHROMIA        RBC Comment SLIGHT  POLYCHROMASIA        RBC Comment MODERATE  OVALOCYTES        RBC Comment OCCASIONAL  TEARDROP CELLS        RBC Comment SLIGHT  STOMATOCYTES        RBC Comment SLIGHT  MACROCYTOSIS        RBC Comment OCCASIONAL  TARGET CELLS        WBC Comment Result Confirmed By Smear      Platelet Comment MARKED      Differential Type AUTOMATED     Comprehensive Metabolic Panel    Collection Time: 07/06/22  5:16 PM   Result Value Ref Range    Sodium 136 136 - 145 mmol/L    Potassium 4.1 3.5 - 5.1 mmol/L    Chloride 104 98 - 107 mmol/L    CO2 24 21 - 32 mmol/L    Anion Gap 8 7 - 16 mmol/L    Glucose 152 (H) 65 - 100 mg/dL    BUN 13 6 - 23 MG/DL    CREATININE 1.00 0.6 - 1.0 MG/DL    GFR African American >60 >60 ml/min/1.73m2    GFR Non- >60 >60 ml/min/1.73m2    Calcium 9.1 8.3 - 10.4 MG/DL    Total Bilirubin 0.3 0.2 - 1.1 MG/DL    ALT 13 12 - 65 U/L    AST 11 (L) 15 - 37 U/L    Alk Phosphatase 97 50 - 136 U/L    Total Protein 7.4 6.3 - 8.2 g/dL    Albumin 3.1 (L) 3.5 - 5.0 g/dL    Globulin 4.3 (H) 2.3 - 3.5 g/dL    Albumin/Globulin Ratio 0.7 (L) 1.2 - 3.5     Magnesium    Collection Time: 07/06/22  5:16 PM   Result Value Ref Range    Magnesium 2.5 (H) 1.8 - 2.4 mg/dL   Hemoglobin and Hematocrit    Collection Time: 07/07/22 12:01 AM   Result Value Ref Range    Hemoglobin 5.8 (LL) 11.7 - 15.4 g/dL    Hematocrit 20.3 (L) 35.8 - 46.3 %   Pregnancy, Urine    Collection Time: 07/07/22  1:32 AM   Result Value Ref Range    HCG(Urine) Pregnancy Test Negative NEG     PREPARE RBC (CROSSMATCH), 1 Units    Collection Time: 07/07/22  3:15 AM   Result Value Ref Range    History Check Historical check performed         No results found for:      Pathology      Accession Number:   [id number]   MR #   [id number]   Date Obtained:   6/15/2022   DIAGNOSIS        A:  \" ENDOMETRIAL CURETTINGS\":         ENDOMETRIUM HAVING ENDOMETRIAL HYPERPLASIA, COMPLEX WITH FOCAL   ATYPIA             Sign Out Date: 6/17/2022  ZANDER Castañeda M.D. Imaging/Diagnostics Last 24 Hours   No results found. Radiology:    CT Result (most recent):  No results found for this or any previous visit from the past 3650 days. PET Results (most recent):  No results found for this or any previous visit from the past 365 days. Mammo Results (most recent):  No results found for this or any previous visit from the past 365 days.          US Result (most recent):  US PELVIS COMPLETE 07/06/2022    Narrative  Clinical history: Menorrhagia and anemia. D and C on 6/15/2022. Negative  pregnancy test.    TECHNIQUE: Transabdominal pelvic ultrasound. COMPARISON: None    FINDINGS:    The uterus measures 18.2 x 11.5 x 13.3 cm. There is large uterine fibroid at the  fundus, extending from the myometrium to the endometrium. This measures about  8.7 x 7.6 x 6.9 cm. There is a small 2.6 cm intramural fibroid. Nabothian cysts  are noted. Endometrial echo complex measures 2.7 cm. Both ovaries are unremarkable. Left ovary measures 4.6 x 3.6 x 2.2 cm and the  right measures 3.7 x 2.8 x 2.6 cm. There is color Doppler flow. There is no significant free fluid. Impression  1. Large fibroid at the uterine fundus, extending from the myometrium to the  endometrium. This measures up to 8.7 cm. Assessment      Hospital Problems           Last Modified POA    * (Principal) Syncope and collapse 7/6/2022 Yes    Menorrhagia with regular cycle 7/6/2022 Yes    Anemia due to chronic blood loss 7/6/2022 Yes    Class 2 severe obesity due to excess calories with serious comorbidity and body mass index (BMI) of 39.0 to 39.9 in adult [facility]) 7/6/2022 Yes         Diagnosis Orders   1. Menorrhagia with regular cycle  Type and Screen    CBC with Auto Differential    Transferrin Saturation    Ferritin     Specialty Problems     None          Plan   1. Pt continues to wish to preserve fertility     Add lupron for acute premenopausal bleeding with need transfusion/repeated, iron df anemia, large leiomyoma, atypical hyperplasia    Iron ifusion     Cont change in progestin to megace 80 bid, stop provera    Transfuse to target hgb 10 with continued bleeding, although pt reports may be slowing    Has office visit/fu in gyn oncology next Wednesday with labs.             Aviva Santiago MD  [place] [address]  Office : [phone]  Fax :  [phone]

## 2022-07-07 NOTE — PROGRESS NOTES
Physical Therapy Note:    Attempted to see patient this AM for physical therapy evaluation session. Patient with Hgb 6.7 and to receive blood, will hold PT. Will follow and re-attempt as schedule permits/patient appropriate.  Thank you,    KAR Hoyt, PT     Rehab Caseload Tracker

## 2022-07-07 NOTE — PROGRESS NOTES
PHYSICAL THERAPY Initial Assessment and Discharge  (Link to Caseload Tracking:    Acknowledge Orders  Time In/Out  PT Charge Capture  Rehab Caseload Tracker    Shanon Toscano is a 44 y.o. female   PRIMARY DIAGNOSIS: Syncope and collapse  Syncope and collapse [R55]  Iron deficiency anemia due to chronic blood loss [D50.0]  Abnormal vaginal bleeding [N93.9]       Reason for Referral: Other abnormalities of gait and mobility (R26.89)  Inpatient: Payor: [employer] / Plan: [facility] / Product Type: *No Product type* /     ASSESSMENT:     REHAB RECOMMENDATIONS:   Recommendation to date pending progress:  Setting:   No further skilled therapy after discharge from hospital    Equipment:     None     ASSESSMENT:  Ms. Kal Rivera lives with her  and son, she is typically independent in home and community. She was admitted with anemia and has received blood today. Patient demonstrated independence with transfers and ambulation in room and hallway. She appears to be functioning close to baseline, no further PT warranted. Will discontinue. .     [address] \[facility] [place] [facility]\"" [address] [address] AM-PAC 6 Clicks Basic Mobility Inpatient Short Form  AM-PAC Mobility Inpatient   How much difficulty turning over in bed?: None  How much difficulty sitting down on / standing up from a chair with arms?: None  How much difficulty moving from lying on back to sitting on side of bed?: None  How much help from another person moving to and from a bed to a chair?: None  How much help from another person needed to walk in hospital room?: None  How much help from another person for climbing 3-5 steps with a railing?: None  AM-PAC Inpatient Mobility Raw Score : 24  AM-PAC Inpatient T-Scale Score : 61.14  Mobility Inpatient CMS 0-100% Score: 0  Mobility Inpatient CMS G-Code Modifier : CH    SUBJECTIVE:   Ms. Kal Rivera states, \"My son is 15\"     Social/Functional Lives With: Spouse  Type of Home: House  Home Layout: One level  Home Access: Stairs to enter with rails  Entrance Stairs - Number of Steps: 3  Bathroom Shower/Tub: Tub/Shower unit  ADL Assistance: Independent  Homemaking Assistance: Independent  Ambulation Assistance: Independent  Transfer Assistance: Independent    OBJECTIVE:     PAIN: Aaron Muskrat / O2: Frankie Mcghee / Pop Laughter / Romaine Brandtly:   Pre Treatment:   Pain Assessment: None - Denies Pain      Post Treatment: 0 Vitals        Oxygen      None    RESTRICTIONS/PRECAUTIONS:                    GROSS EVALUATION: Intact Impaired (Comments):   AROM [x]     PROM []    Strength [x]     Balance [x]     Posture [x] N/A   Sensation []     Coordination [x]      Tone [x]     Edema []    Activity Tolerance [] Patient limited by fatigue    []      COGNITION/  PERCEPTION: Intact Impaired (Comments):   Orientation [x]     Vision []     Hearing [x]     Cognition  [x]       MOBILITY: I Mod I S SBA CGA Min Mod Max Total  NT x2 Comments:   Bed Mobility    Rolling [] [] [] [] [] [] [] [] [] [] []    Supine to Sit [] [] [] [] [] [] [] [] [] [] []    Scooting [] [] [] [] [] [] [] [] [] [] []    Sit to Supine [] [] [] [] [] [] [] [] [] [] []    Transfers    Sit to Stand [x] [] [] [] [] [] [] [] [] [] []    Bed to Chair [x] [] [] [] [] [] [] [] [] [] []    Stand to Sit [x] [] [] [] [] [] [] [] [] [] []     [] [] [] [] [] [] [] [] [] [] []    I=Independent, Mod I=Modified Independent, S=Supervision, SBA=Standby Assistance, CGA=Contact Guard Assistance,   Min=Minimal Assistance, Mod=Moderate Assistance, Max=Maximal Assistance, Total=Total Assistance, NT=Not Tested    GAIT: I Mod I S SBA CGA Min Mod Max Total  NT x2 Comments:   Level of Assistance [x] [] [] [] [] [] [] [] [] [] []    Distance 250 feet    DME Gait Belt    Gait Quality Decreased azalea , Decreased step clearance and Decreased step length    Weightbearing Status      Stairs      I=Independent, Mod I=Modified Independent, S=Supervision, SBA=Standby Assistance, CGA=Contact Guard Assistance,   Min=Minimal Assistance, Mod=Moderate

## 2022-07-07 NOTE — PROGRESS NOTES
END OF SHIFT NOTE:    INTAKE/OUTPUT  07/06 0701 - 07/07 0700  In: 310   Out: -   Voiding: Yes  Catheter: NO  Drain:              Flatus: Patient does not have flatus present. Stool:  1 occurrences. Characteristics:       Emesis: 0 occurrences. Characteristics:        VITAL SIGNS  Patient Vitals for the past 12 hrs:   Temp Pulse Resp BP SpO2   07/07/22 0445 98.2 °F (36.8 °C) 86 19 136/82 100 %   07/07/22 0358 98.7 °F (37.1 °C) 86 18 128/65 100 %   07/07/22 0341 99.1 °F (37.3 °C) 80 18 107/61 98 %   07/07/22 0244 -- 100 -- -- --   07/07/22 0234 99.5 °F (37.5 °C) 100 20 129/81 97 %   07/07/22 0144 99.5 °F (37.5 °C) 100 20 129/81 97 %   07/06/22 2331 99.4 °F (37.4 °C) 91 21 119/67 100 %   07/06/22 2145 99.8 °F (37.7 °C) 92 18 133/76 99 %   07/06/22 2128 99.9 °F (37.7 °C) 95 18 126/78 100 %   07/06/22 1905 98.5 °F (36.9 °C) 97 18 (!) 99/43 100 %   07/06/22 1851 98.6 °F (37 °C) 100 20 114/71 100 %   07/06/22 1842 -- 95 21 114/71 100 %   07/06/22 1832 -- 95 17 (!) 109/57 100 %   07/06/22 1822 -- 98 24 (!) 99/54 100 %       Pain Assessment  @BSHSIFLOW(1171)@             Ambulating  Yes    Shift report given to oncoming nurse at the bedside.     Charly Downs, RN

## 2022-07-07 NOTE — DISCHARGE SUMMARY
Hospitalist Discharge Summary   Admit Date:  2022  4:52 PM   DC Note date: 2022  Name:  Kim Ryder   Age:  44 y.o. Sex:  female  :  1985   MRN:  [id number]   Room:  221/01  PCP:  None Provider    Presenting Complaint: Vaginal Bleeding     Initial Admission Diagnosis: Syncope and collapse [R55]  Iron deficiency anemia due to chronic blood loss [D50.0]  Abnormal vaginal bleeding [N93.9]     Problem List for this Hospitalization (present on admission):    Principal Problem:    Syncope and collapse  Active Problems:    Menorrhagia with regular cycle    Anemia due to chronic blood loss    Class 2 severe obesity due to excess calories with serious comorbidity and body mass index (BMI) of 39.0 to 39.9 in [facility])  Resolved Problems:    * No resolved hospital problems. *    Did Patient have Sepsis (YES OR NO): NO    Kim Ryder is a 44 y.o. female with medical history of uncontrolled vaginal bleeding status post D&C who presented with continual bleeding since her D&C. Upon presentation her hemoglobin was 4.0. She had her D&C weeks ago and has had continuous bleeding since that time. She had a follow-up appointment with gynecology and was referred to the emergency department. While waiting for triage she passed out in the bathroom. She briefly lost consciousness but was quickly aroused and able to provide relevant and accurate answers to questions. She reports she had 1 son is 13 and healthy. Her sister has endometriosis. She has had intermittent difficulty with vaginal bleeding for years. She reports headache. Denies vision changes, chest pain, shortness of breath, abdominal pain, nausea, vomiting, diarrhea, changes in urine, peripheral edema. Aside from the severe anemia, her labs look relatively good. Blood transfusion was initiated in the emergency department and continue on upon admission. Interval History (): patient examined at bedside. No acute overnight events.  Still with some resolving vaginal bleeding. Dizziness and fatigue are improving. Has received total of 3 units of pRBC since admission with repeat Hgb of 6.7. No chest pain or shortness of breath. Hospital Course:  Patient admitted with persistent symptomatic blood loss iron deficiency anemia due to vaginal bleeding. Given 2 units pRBC transfusion in the ED with an additional 1 unit given upon admission. Will give 3 more units today along with iron infusion. Seen by gyn-onc with recommendations for Provera, Megace, and outpatient follow-up in the clinic. She has improved clinically and will discharge later this evening pending repeat in her Hgb/Hct. Details of hospitalization have been discussed with patient at bedside who understands and agrees with plan of care and discharge home. Return precautions provided. All questions answered. Disposition: Home  Diet: ADULT DIET; Regular  Code Status: Full Code    Follow Ups: With PCP and with gynecology within the next 2-3 days. Time spent in patient discharge and coordination 37 minutes. Plan was discussed with patient at bedside. All questions answered. Patient was stable at time of discharge. Instructions given to call a physician or return if any concerns.     Current Discharge Medication List      CONTINUE these medications which have NOT CHANGED    Details   megestrol (MEGACE) 40 MG tablet Take 2 tablets by mouth 2 times daily  Qty: 120 tablet, Refills: 3      !! ibuprofen (ADVIL;MOTRIN) 800 MG tablet Take 1 tablet by mouth every 8 hours as needed for Pain  Qty: 21 tablet, Refills: 0      Ferrous Gluconate-C-Folic Acid (IRON-C PO) Take by mouth      ferrous gluconate (FERGON) 324 (38 Fe) MG tablet Take 1 tablet by mouth daily (with breakfast)  Qty: 30 tablet, Refills: 3      !! ibuprofen (ADVIL;MOTRIN) 600 MG tablet Take 1 tablet by mouth 4 times daily as needed for Pain  Qty: 40 tablet, Refills: 0      medroxyPROGESTERone (PROVERA) 10 MG tablet Take 1 tablet by mouth in the morning and at bedtime  Qty: 30 tablet, Refills: 3       !! - Potential duplicate medications found. Please discuss with provider. Procedures done this admission:  * No surgery found *    Consults this admission:  IP CONSULT TO OB GYN    Echocardiogram results:  No results found for this or any previous visit. Diagnostic Imaging/Tests:   US PELVIS COMPLETE    Result Date: 7/7/2022  Clinical history: Menorrhagia and anemia. D and C on 6/15/2022. Negative pregnancy test. TECHNIQUE: Transabdominal pelvic ultrasound. COMPARISON: None FINDINGS: The uterus measures 18.2 x 11.5 x 13.3 cm. There is large uterine fibroid at the fundus, extending from the myometrium to the endometrium. This measures about 8.7 x 7.6 x 6.9 cm. There is a small 2.6 cm intramural fibroid. Nabothian cysts are noted. Endometrial echo complex measures 2.7 cm. Both ovaries are unremarkable. Left ovary measures 4.6 x 3.6 x 2.2 cm and the right measures 3.7 x 2.8 x 2.6 cm. There is color Doppler flow. There is no significant free fluid. 1. Large fibroid at the uterine fundus, extending from the myometrium to the endometrium. This measures up to 8.7 cm.         Labs: Results:       BMP, Mg, Phos Recent Labs     07/06/22  1716 07/07/22  0742    138   K 4.1 4.0    105   CO2 24 27   ANIONGAP 8 6*   BUN 13 14   CREATININE 1.00 1.00   LABGLOM >60 >60   GFRAA >60 >60   CALCIUM 9.1 8.6   GLUCOSE 152* 96      CBC Recent Labs     07/06/22  1457 07/06/22  1457 07/06/22  1716 07/07/22  0001 07/07/22  0742   WBC 9.6  --  10.7  --  10.2   RBC 2.08*  --  2.07*  --  2.73*   HGB 4.2*   < > 4.0* 5.8* 6.7*   HCT 15.6*   < > 15.3* 20.3* 21.8*   MCV 75.0*  --  73.9*  --  79.9   MCH 20.2*  --  19.3*  --  24.5*   MCHC 26.9*  --  26.1*  --  30.7*   RDW 24.2*  --  24.6*  --  23.4*   *  --  898*  --  633*   MPV 10.1  --  10.6  --  10.7   NRBC 0.23*  --  0.30*  --  0.31*   SEGS 76  --  70  --  74   LYMPHOPCT 17  --  23  --  18 EOSRELPCT 1  --  0*  --  0*   MONOPCT 5  --  6  --  6   BASOPCT 0  --  0  --  1   IMMGRAN 1  --  1  --  1   SEGSABS 7.3  --  7.5  --  7.6   LYMPHSABS 1.6  --  2.5  --  1.8   EOSABS 0.1  --  0.0  --  0.0   MONOSABS 0.5  --  0.6  --  0.6   BASOSABS 0.0  --  0.0  --  0.1   ABSIMMGRAN 0.1  --  0.1  --  0.1    < > = values in this interval not displayed. LFT Recent Labs     07/06/22  1716 07/07/22  0742   BILITOT 0.3 1.4*   ALKPHOS 97 87   AST 11* 9*   ALT 13 12   PROT 7.4 6.5   LABALBU 3.1* 2.8*   GLOB 4.3* 3.7*      Cardiac  No results found for: NTPROBNP, TROPHS   Coags No results found for: PROTIME, INR, APTT   A1c No results found for: LABA1C, EAG   Lipids No results found for: CHOL, LDLCALC, LABVLDL, HDL, CHOLHDLRATIO, TRIG   Thyroid  No results found for: Reola Cordon     Most Recent UA No results found for: COLORU, APPEARANCE, SPECGRAV, LABPH, PROTEINU, GLUCOSEU, KETUA, BILIRUBINUR, BLOODU, UROBILINOGEN, NITRU, LEUKOCYTESUR, WBCUA, RBCUA, EPITHUA, BACTERIA, LABCAST, MUCUS       All Labs from Last 24 Hrs:  Recent Results (from the past 24 hour(s))   POCT Glucose    Collection Time: 07/06/22  5:04 PM   Result Value Ref Range    POC Glucose 184 (H) 65 - 100 mg/dL    Performed by:  Sheila    EKG 12 Lead    Collection Time: 07/06/22  5:06 PM   Result Value Ref Range    Ventricular Rate 80 BPM    Atrial Rate 81 BPM    P-R Interval 147 ms    QRS Duration 87 ms    Q-T Interval 371 ms    QTc Calculation (Bazett) 428 ms    P Axis 57 degrees    R Axis 39 degrees    T Axis 44 degrees    Diagnosis Sinus rhythm    PREPARE RBC (CROSSMATCH), 2 Units    Collection Time: 07/06/22  5:15 PM   Result Value Ref Range    History Check Historical check performed    TYPE AND SCREEN    Collection Time: 07/06/22  5:15 PM   Result Value Ref Range    Crossmatch expiration date 07/09/2022,8957     ABO/Rh B POSITIVE     Antibody Screen NEG     Blood Bank Comment Alonzo Chapa RN @ 9008 ON 7/6/22 THAT BLOOD IS READY MG      Unit Number MODERATE  OVALOCYTES        RBC Comment OCCASIONAL  TEARDROP CELLS        RBC Comment SLIGHT  STOMATOCYTES        RBC Comment SLIGHT  MACROCYTOSIS        RBC Comment OCCASIONAL  TARGET CELLS        WBC Comment Result Confirmed By Smear      Platelet Comment MARKED      Differential Type AUTOMATED     Comprehensive Metabolic Panel    Collection Time: 07/06/22  5:16 PM   Result Value Ref Range    Sodium 136 136 - 145 mmol/L    Potassium 4.1 3.5 - 5.1 mmol/L    Chloride 104 98 - 107 mmol/L    CO2 24 21 - 32 mmol/L    Anion Gap 8 7 - 16 mmol/L    Glucose 152 (H) 65 - 100 mg/dL    BUN 13 6 - 23 MG/DL    CREATININE 1.00 0.6 - 1.0 MG/DL    GFR African American >60 >60 ml/min/1.73m2    GFR Non- >60 >60 ml/min/1.73m2    Calcium 9.1 8.3 - 10.4 MG/DL    Total Bilirubin 0.3 0.2 - 1.1 MG/DL    ALT 13 12 - 65 U/L    AST 11 (L) 15 - 37 U/L    Alk Phosphatase 97 50 - 136 U/L    Total Protein 7.4 6.3 - 8.2 g/dL    Albumin 3.1 (L) 3.5 - 5.0 g/dL    Globulin 4.3 (H) 2.3 - 3.5 g/dL    Albumin/Globulin Ratio 0.7 (L) 1.2 - 3.5     Magnesium    Collection Time: 07/06/22  5:16 PM   Result Value Ref Range    Magnesium 2.5 (H) 1.8 - 2.4 mg/dL   Hemoglobin and Hematocrit    Collection Time: 07/07/22 12:01 AM   Result Value Ref Range    Hemoglobin 5.8 (LL) 11.7 - 15.4 g/dL    Hematocrit 20.3 (L) 35.8 - 46.3 %   Pregnancy, Urine    Collection Time: 07/07/22  1:32 AM   Result Value Ref Range    HCG(Urine) Pregnancy Test Negative NEG     PREPARE RBC (CROSSMATCH), 1 Units    Collection Time: 07/07/22  3:15 AM   Result Value Ref Range    History Check Historical check performed    Comprehensive Metabolic Panel w/ Reflex to MG    Collection Time: 07/07/22  7:42 AM   Result Value Ref Range    Sodium 138 136 - 145 mmol/L    Potassium 4.0 3.5 - 5.1 mmol/L    Chloride 105 98 - 107 mmol/L    CO2 27 21 - 32 mmol/L    Anion Gap 6 (L) 7 - 16 mmol/L    Glucose 96 65 - 100 mg/dL    BUN 14 6 - 23 MG/DL    CREATININE 1.00 0.6 - 1.0 MG/DL    GFR African American >60 >60 ml/min/1.73m2    GFR Non- >60 >60 ml/min/1.73m2    Calcium 8.6 8.3 - 10.4 MG/DL    Total Bilirubin 1.4 (H) 0.2 - 1.1 MG/DL    ALT 12 12 - 65 U/L    AST 9 (L) 15 - 37 U/L    Alk Phosphatase 87 50 - 136 U/L    Total Protein 6.5 6.3 - 8.2 g/dL    Albumin 2.8 (L) 3.5 - 5.0 g/dL    Globulin 3.7 (H) 2.3 - 3.5 g/dL    Albumin/Globulin Ratio 0.8 (L) 1.2 - 3.5     CBC with Auto Differential    Collection Time: 07/07/22  7:42 AM   Result Value Ref Range    WBC 10.2 4.3 - 11.1 K/uL    RBC 2.73 (L) 4.05 - 5.2 M/uL    Hemoglobin 6.7 (LL) 11.7 - 15.4 g/dL    Hematocrit 21.8 (L) 35.8 - 46.3 %    MCV 79.9 79.6 - 97.8 FL    MCH 24.5 (L) 26.1 - 32.9 PG    MCHC 30.7 (L) 31.4 - 35.0 g/dL    RDW 23.4 (H) 11.9 - 14.6 %    Platelets 175 (H) 927 - 450 K/uL    MPV 10.7 9.4 - 12.3 FL    nRBC 0.31 (H) 0.0 - 0.2 K/uL    Differential Type AUTOMATED      Seg Neutrophils 74 43 - 78 %    Lymphocytes 18 13 - 44 %    Monocytes 6 4.0 - 12.0 %    Eosinophils % 0 (L) 0.5 - 7.8 %    Basophils 1 0.0 - 2.0 %    Immature Granulocytes 1 0.0 - 5.0 %    Segs Absolute 7.6 1.7 - 8.2 K/UL    Absolute Lymph # 1.8 0.5 - 4.6 K/UL    Absolute Mono # 0.6 0.1 - 1.3 K/UL    Absolute Eos # 0.0 0.0 - 0.8 K/UL    Basophils Absolute 0.1 0.0 - 0.2 K/UL    Absolute Immature Granulocyte 0.1 0.0 - 0.5 K/UL   Phosphorus    Collection Time: 07/07/22  7:42 AM   Result Value Ref Range    Phosphorus 4.3 2.5 - 4.5 MG/DL   PREPARE RBC (CROSSMATCH), 3 Units    Collection Time: 07/07/22  9:45 AM   Result Value Ref Range    History Check Historical check performed        No Known Allergies    There is no immunization history on file for this patient.     Recent Vital Data:  Patient Vitals for the past 24 hrs:   Temp Pulse Resp BP SpO2   07/07/22 1459 98.2 °F (36.8 °C) 77 16 123/72 100 %   07/07/22 1328 -- -- -- (!) 133/56 --   07/07/22 1247 98.3 °F (36.8 °C) 85 16 118/73 99 %   07/07/22 1232 98.3 °F (36.8 °C) 76 16 120/66 99 %   07/07/22 1115 98.2 °F (36.8 °C) 83 16 124/68 100 %   07/07/22 1100 98.2 °F (36.8 °C) 81 16 123/76 99 %   07/07/22 0748 98.6 °F (37 °C) 82 17 136/74 98 %   07/07/22 0623 99.1 °F (37.3 °C) 75 19 129/76 99 %   07/07/22 0617 99.1 °F (37.3 °C) 75 19 129/76 99 %   07/07/22 0445 98.2 °F (36.8 °C) 86 19 136/82 100 %   07/07/22 0358 98.7 °F (37.1 °C) 86 18 128/65 100 %   07/07/22 0341 99.1 °F (37.3 °C) 80 18 107/61 98 %   07/07/22 0244 -- 100 -- -- --   07/07/22 0234 99.5 °F (37.5 °C) 100 20 129/81 97 %   07/07/22 0144 99.5 °F (37.5 °C) 100 20 129/81 97 %   07/06/22 2331 99.4 °F (37.4 °C) 91 21 119/67 100 %   07/06/22 2145 99.8 °F (37.7 °C) 92 18 133/76 99 %   07/06/22 2128 99.9 °F (37.7 °C) 95 18 126/78 100 %   07/06/22 1905 98.5 °F (36.9 °C) 97 18 (!) 99/43 100 %   07/06/22 1851 98.6 °F (37 °C) 100 20 114/71 100 %   07/06/22 1842 -- 95 21 114/71 100 %   07/06/22 1832 -- 95 17 (!) 109/57 100 %   07/06/22 1822 -- 98 24 (!) 99/54 100 %   07/06/22 1802 -- 95 19 113/69 100 %   07/06/22 1630 99.1 °F (37.3 °C) (!) 101 18 127/75 100 %       Oxygen Therapy  SpO2: 100 %  O2 Device: None (Room air)    Estimated body mass index is 39.68 kg/m² as calculated from the following:    Height as of this encounter: 5' 3\" (1.6 m). Weight as of this encounter: 224 lb (101.6 kg). Intake/Output Summary (Last 24 hours) at 7/7/2022 1518  Last data filed at 7/7/2022 1413  Gross per 24 hour   Intake 1465.83 ml   Output --   Net 1465.83 ml         Physical Exam:    General:    Well nourished. No overt distress  Head:  Normocephalic, atraumatic  Eyes:  Sclerae appear normal.  Pupils equally round. HENT:  Nares appear normal, no drainage. Moist mucous membranes  Neck:  No restricted ROM. Trachea midline  CV:   RRR. No JVD  Lungs:   CTAB. No wheezing, rhonchi, or rales. Respirations even, unlabored  Abdomen:   Soft, nontender, nondistended. Reproductive:  Defrred  Extremities: Warm and dry. No cyanosis or clubbing. No edema. Skin:     No rashes. Normal coloration  Neuro:  CN II-XII grossly intact. Psych:  Normal mood and affect. Signed:  Steve Gay DO    Part of this note may have been written by using a voice dictation software. The note has been proof read but may still contain some grammatical/other typographical errors.

## 2022-07-07 NOTE — CARE COORDINATION
3:51PM:  Pt with discharge orders  This day. Pt to return home. PCP referral made this day. No additional CM needs at discharge. 12:14PM: Chart screened by  for discharge planning. No needs identified at this time. Please consult  if any new issues arise. 07/07/22 0244   Service Assessment   Support Systems Children;Spouse/Significant Other   Discharge Planning   Type of Residence House   Living Arrangements Spouse/Significant Other;Children   Current Services Prior To Admission None   Potential Assistance Needed N/A   DME Ordered? No   Potential Assistance Purchasing Medications No   Type of Home Care Services None   Patient expects to be discharged to: [place]   Follow Up Appointment: Best Day/Time  Tuesday AM  (Any Day)   One/Two Story Residence One story   History of falls?  1

## 2022-07-07 NOTE — PROGRESS NOTES
Patient repeat Hgb 6.7. Dr. Tamie Harrison notified at patient bedside at [phone]. MD to place orders for additional blood transfusion.

## 2022-07-07 NOTE — PROGRESS NOTES
OCCUPATIONAL THERAPY Initial Assessment       OT Visit Days: 1  Acknowledge Orders  Time  OT Charge Capture  Rehab Caseload Mimi Oates is a 44 y.o. female   PRIMARY DIAGNOSIS: Syncope and collapse  Syncope and collapse [R55]  Iron deficiency anemia due to chronic blood loss [D50.0]  Abnormal vaginal bleeding [N93.9]       Reason for Referral: Generalized Muscle Weakness (M62.81)  Inpatient: Payor: [facility] / Plan: [employer] / Product Type: *No Product type* /     ASSESSMENT:     REHAB RECOMMENDATIONS:   Recommendation to date pending progress:  Setting:   No further skilled therapy after discharge from hospital    Equipment:     None     ASSESSMENT:  Ms. Petrona Goldberg is a 44 y F with hx of extreme vaginal bleeding. Pt admitted for syncope and collapse related to severe anemia. Pt having difficulty to keep eyes open during session. Received sitting up in bed eating breakfast set up only. Supine > sit close SBA for safety. Static sitting EOB F/F+ balance. /56 sitting EOB with c/o dizziness. STS CGA tolerating standing for 45- 60 seconds but needed to sit due to increased dizziness. Pt is very weak due to low HGB of 6.7 needing another transfusion. Too fatigued for MMT. Prognosis for therapy is good once medical stable. Pt requires continued skilled OT services due to performing functionally below baseline. Pt has deficits in balance, activity tolerance, and performing ADLs.       [address] [facility] [address] [address] AM-PAC 6 Clicks Daily Activity Inpatient Short Form:    AM-PAC Daily Activity Inpatient   How much help for putting on and taking off regular lower body clothing?: A Lot  How much help for Bathing?: A Little  How much help for Toileting?: A Little  How much help for putting on and taking off regular upper body clothing?: A Little  How much help for taking care of personal grooming?: None  How much help for eating meals?: None  AM-[facility] Inpatient Daily Activity Raw Score: 19  AM-PAC Inpatient CGA=Contact Guard Assistance, Min=Minimal Assistance, Mod=Moderate Assistance, Max=Maximal Assistance, Total=Total Assistance, NT=Not Tested    ACTIVITIES OF DAILY LIVING: I Mod I S SBA CGA Min Mod Max Total NT Comments   BASIC ADLs:              Upper Body Bathing  [] [] [] [] [] [] [] [] [] [x]    Lower Body Bathing [] [] [] [] [] [] [] [] [] [x]    Toileting [] [] [] [] [] [] [] [] [] [x]    Upper Body Dressing [] [] [] [] [] [] [] [] [] [x]    Lower Body Dressing [] [] [] [] [] [] [] [] [] [x]    Feeding [] [] [x] [] [] [] [] [] [] []    Grooming [] [] [] [] [] [] [] [] [] [x]    Personal Device Care [] [] [] [] [] [] [] [] [] [x]    Functional Mobility [] [] [] [] [x] [] [] [] [] []    I=Independent, Mod I=Modified Independent, S=Supervision/Setup, SBA=Standby Assistance, CGA=Contact Guard Assistance, Min=Minimal Assistance, Mod=Moderate Assistance, Max=Maximal Assistance, Total=Total Assistance, NT=Not Tested    PLAN:     FREQUENCY/DURATION   OT Plan of Care: 3 times/week for duration of hospital stay or until stated goals are met, whichever comes first.    ACUTE OCCUPATIONAL THERAPY GOALS:   (Developed with and agreed upon by patient and/or caregiver.)  1. Pt will complete LBD set up only with AE as needed. 2. Pt will complete toileting supervision with AE as needed. 3. Pt will complete UBD set up only. 4. Pt will tolerate 25 minutes of OT treatment requiring 1-2 breaks as needed. 5. Pt will complete grooming tasks while standing at sink I.  6. Pt will complete functional mobility I.   7. Pt will tolerate BUE exercises to increase strength for functional mobility and ADL participation.        PROBLEM LIST:   (Skilled intervention is medically necessary to address:)  Decreased ADL/Functional Activities  Decreased Activity Tolerance  Decreased Balance  Decreased Strength  Decreased Transfer Abilities   INTERVENTIONS PLANNED:  (Benefits and precautions of occupational therapy have been discussed with the patient.)  Self Care Training  Therapeutic Activity  Therapeutic Exercise/HEP  Neuromuscular Re-education  Education         TREATMENT:     EVALUATION: LOW COMPLEXITY: (Untimed Charge)    TREATMENT:   Therapeutic Activity (18 Minutes): Therapeutic activity included Supine to Sit, Sit to Supine, Sitting balance  and Standing balance to improve functional Activity tolerance, Balance, Coordination, Mobility and Strength.     TREATMENT GRID:  N/A    AFTER TREATMENT PRECAUTIONS: Bed, Bed/Chair Locked, Call light within reach, Needs within reach, RN notified and Visitors at bedside    INTERDISCIPLINARY COLLABORATION:  RN/ PCT and OT/ BLOCK    EDUCATION:  Education Given To: Patient  Education Provided: Role of Therapy;Plan of Care;ADL Adaptive Strategies  Education Method: Verbal  Barriers to Learning: None  Education Outcome: Verbalized understanding    TOTAL TREATMENT DURATION AND TIME:  Time In: 0930  Time Out: [place] 30  Minutes: 23 Jane Awad OT

## 2022-07-07 NOTE — ED NOTES
Critical hemoglobin of 5.8 received from lab, dr pelletier notified.      Mariaa Akbar, RN  07/07/22 0040

## 2022-07-07 NOTE — PROGRESS NOTES
Patient has admitted to floor with Hgb of 5.8 and was given 2 units in ED. Hospitalist was notitifed and orders were received to transfuse one unit of PRBC.

## 2022-07-08 ENCOUNTER — TELEPHONE (OUTPATIENT)
Dept: CASE MANAGEMENT | Age: 37
End: 2022-07-08

## 2022-07-08 LAB
ABO + RH BLD: NORMAL
BLD PROD TYP BPU: NORMAL
BLOOD BANK CMNT PATIENT-IMP: NORMAL
BLOOD BANK DISPENSE STATUS: NORMAL
BLOOD GROUP ANTIBODIES SERPL: NORMAL
BPU ID: NORMAL
CROSSMATCH RESULT: NORMAL
SPECIMEN EXP DATE BLD: NORMAL
UNIT DIVISION: 0

## 2022-07-08 RX ORDER — SODIUM CHLORIDE 0.9 % (FLUSH) 0.9 %
5-40 SYRINGE (ML) INJECTION PRN
Status: CANCELLED | OUTPATIENT
Start: 2022-07-14

## 2022-07-08 RX ORDER — SODIUM CHLORIDE 9 MG/ML
5-250 INJECTION, SOLUTION INTRAVENOUS PRN
Status: CANCELLED | OUTPATIENT
Start: 2022-07-14

## 2022-07-08 RX ORDER — ONDANSETRON 2 MG/ML
8 INJECTION INTRAMUSCULAR; INTRAVENOUS
Status: CANCELLED | OUTPATIENT
Start: 2022-07-14

## 2022-07-08 RX ORDER — FAMOTIDINE 10 MG/ML
20 INJECTION, SOLUTION INTRAVENOUS
Status: CANCELLED | OUTPATIENT
Start: 2022-07-14

## 2022-07-08 RX ORDER — ALBUTEROL SULFATE 90 UG/1
4 AEROSOL, METERED RESPIRATORY (INHALATION) PRN
Status: CANCELLED | OUTPATIENT
Start: 2022-07-14

## 2022-07-08 RX ORDER — SODIUM CHLORIDE 9 MG/ML
INJECTION, SOLUTION INTRAVENOUS CONTINUOUS
Status: CANCELLED | OUTPATIENT
Start: 2022-07-14

## 2022-07-08 RX ORDER — DIPHENHYDRAMINE HYDROCHLORIDE 50 MG/ML
50 INJECTION INTRAMUSCULAR; INTRAVENOUS
Status: CANCELLED | OUTPATIENT
Start: 2022-07-14

## 2022-07-08 RX ORDER — HEPARIN SODIUM (PORCINE) LOCK FLUSH IV SOLN 100 UNIT/ML 100 UNIT/ML
500 SOLUTION INTRAVENOUS PRN
Status: CANCELLED | OUTPATIENT
Start: 2022-07-14

## 2022-07-08 RX ORDER — EPINEPHRINE 1 MG/ML
0.3 INJECTION, SOLUTION, CONCENTRATE INTRAVENOUS PRN
Status: CANCELLED | OUTPATIENT
Start: 2022-07-14

## 2022-07-08 RX ORDER — ACETAMINOPHEN 325 MG/1
650 TABLET ORAL
Status: CANCELLED | OUTPATIENT
Start: 2022-07-14

## 2022-07-08 NOTE — TELEPHONE ENCOUNTER
Called patient and she reported to be doing better. She reports the bleeding has decreased. She verified she was able  the megace from pharmacy and took it this morning . She was instructed to call if the bleeding became heavy again before her Wednesday visit. Iron treatment plan placed and notification sent to financial and scheduling.

## 2022-07-11 DIAGNOSIS — N92.0 MENORRHAGIA WITH REGULAR CYCLE: Primary | ICD-10-CM

## 2022-07-12 NOTE — PROGRESS NOTES
Date: 7/13/2022        Patient Name: Lisa Smith     YOB: 1985          Chief Complaint     Chief Complaint   Patient presents with    Follow-up    anemia, severe, noted on initial consult visit, transfused, July 2022   lupron 4 week dose, inpt., July 2022   Megace 80 bid (change from provera, July 2022)    endometrial hyperplasia, atypical   Pt reports episode in past  Taking provera, 20 daily    Large fibroids    Obesity    Abnormal bleeding   Many months, at least since last fall   Pt reports has continued bleeding daily since d and c with gyn    Reported 2 untis prbc at procedure. Wishes to preserve fertility    Has used some asa during this recent bleeding episode      History of Present Illness   Lisa Smith is a 44 y.o. who presents today for scheduled visit    Noted long hx abnormal bleeding, and about 2 years ago reports episode hyperplasia requireing tx. Increasing bleding over at least six moths, eventually d and c with gyn, complex hyperplasia  Also at that time recived 2 u prbc    Reports continued bleeding since d and c, no knonwn h/h with gyn, taking provera 20 daily as directed, with increasing fatigue.     Past Medical History     Past Medical History:   Diagnosis Date    Fibroid     Iron deficiency anemia     Menorrhagia 2016    Thickened endometrium         Past Surgical History     Past Surgical History:   Procedure Laterality Date    APPENDECTOMY      CHOLECYSTECTOMY      DILATION AND CURETTAGE OF UTERUS  03/04/2016    By Dr. Gale Sine N/A 06/15/2022    DILATATION AND CURETTAGE HYSTEROSCOPY performed by Victoriano Suggs MD at [facility]        Medications      Current Outpatient Medications:     megestrol (MEGACE) 40 MG tablet, Take 2 tablets by mouth 2 times daily, Disp: 120 tablet, Rfl: 3    ferrous gluconate (FERGON) 324 (38 Fe) MG tablet, Take 1 tablet by mouth daily (with breakfast), Disp: 30 tablet, Rfl: 3    ibuprofen (ADVIL;MOTRIN) 600 MG tablet, Take 1 tablet by mouth 4 times daily as needed for Pain, Disp: 40 tablet, Rfl: 0    ibuprofen (ADVIL;MOTRIN) 800 MG tablet, Take 1 tablet by mouth every 8 hours as needed for Pain (Patient not taking: Reported on 7/13/2022), Disp: 21 tablet, Rfl: 0    Ferrous Gluconate-C-Folic Acid (IRON-C PO), Take by mouth (Patient not taking: Reported on 7/13/2022), Disp: , Rfl:     medroxyPROGESTERone (PROVERA) 10 MG tablet, Take 1 tablet by mouth in the morning and at bedtime (Patient not taking: Reported on 7/13/2022), Disp: 30 tablet, Rfl: 3     Allergies   Patient has no known allergies. Social History     Social History     Tobacco History     Smoking Status  Never Smoker    Smokeless Tobacco Use  Never Used          Alcohol History     Alcohol Use Status  Not Currently          Drug Use     Drug Use Status  Never          Sexual Activity     Sexually Active  Yes Partners  Male                Family History     Family History   Problem Relation Age of Onset    Endometriosis Sister        Review of Systems   Review of Systems   Genitourinary: Negative for vaginal bleeding. All other systems reviewed and are negative. Physical Exam     ECOG PERFORMANCE STATUS -0    Blood pressure (!) 140/77, pulse 76, temperature 98.6 °F (37 °C), temperature source Oral, resp. rate 14, height 5' 3\" (1.6 m), weight 230 lb (104.3 kg), SpO2 100 %. Physical Exam  Vitals and nursing note reviewed. Exam conducted with a chaperone present.          Labs        Recent Results (from the past 48 hour(s))   CBC with Auto Differential    Collection Time: 07/13/22  2:01 PM   Result Value Ref Range    WBC 6.1 4.3 - 11.1 K/uL    RBC 3.97 (L) 4.05 - 5.2 M/uL    Hemoglobin 10.2 (L) 11.7 - 15.4 g/dL    Hematocrit 33.4 (L) 35.8 - 46.3 %    MCV 84.1 79.6 - 97.8 FL    MCH 25.7 (L) 26.1 - 32.9 PG    MCHC 30.5 (L) 31.4 - 35.0 g/dL    RDW 21.4 (H) 11.9 - 14.6 %    Platelets 992 (H) 639 - 450 K/uL    MPV 10.7 9.4 - 12.3 FL    nRBC 0.00 0.0 - 0.2 K/uL    Differential Type AUTOMATED      Seg Neutrophils 72 43 - 78 %    Lymphocytes 21 13 - 44 %    Monocytes 4 4.0 - 12.0 %    Eosinophils % 3 0.5 - 7.8 %    Basophils 0 0.0 - 2.0 %    Immature Granulocytes 0 0.0 - 5.0 %    Segs Absolute 4.4 1.7 - 8.2 K/UL    Absolute Lymph # 1.3 0.5 - 4.6 K/UL    Absolute Mono # 0.2 0.1 - 1.3 K/UL    Absolute Eos # 0.2 0.0 - 0.8 K/UL    Basophils Absolute 0.0 0.0 - 0.2 K/UL    Absolute Immature Granulocyte 0.0 0.0 - 0.5 K/UL        No results found for:      Pathology      Accession Number:   [id number]   MR #   [id number]   Date Obtained:   6/15/2022   DIAGNOSIS        A:  \" ENDOMETRIAL CURETTINGS\":         ENDOMETRIUM HAVING ENDOMETRIAL HYPERPLASIA, COMPLEX WITH FOCAL   ATYPIA             Sign Out Date: 6/17/2022  ZANDER Noel M.D. Imaging/Diagnostics Last 24 Hours   No results found. Radiology:    CT Result (most recent):  No results found for this or any previous visit from the past 3650 days. PET Results (most recent):  No results found for this or any previous visit from the past 365 days. Mammo Results (most recent):  No results found for this or any previous visit from the past 365 days. US Result (most recent):  US PELVIS COMPLETE 07/06/2022    Narrative  Clinical history: Menorrhagia and anemia. D and C on 6/15/2022. Negative  pregnancy test.    TECHNIQUE: Transabdominal pelvic ultrasound. COMPARISON: None    FINDINGS:    The uterus measures 18.2 x 11.5 x 13.3 cm. There is large uterine fibroid at the  fundus, extending from the myometrium to the endometrium. This measures about  8.7 x 7.6 x 6.9 cm. There is a small 2.6 cm intramural fibroid. Nabothian cysts  are noted. Endometrial echo complex measures 2.7 cm. Both ovaries are unremarkable. Left ovary measures 4.6 x 3.6 x 2.2 cm and the  right measures 3.7 x 2.8 x 2.6 cm. There is color Doppler flow.     There is no significant free fluid. Impression  1. Large fibroid at the uterine fundus, extending from the myometrium to the  endometrium. This measures up to 8.7 cm. Assessment       Diagnosis Orders   1. Endometrial hyperplasia with atypia     2. Anemia due to chronic blood loss     3. Fibroids       Specialty Problems     None          Plan   1.   Cont with iron infusion protocol, started as inpt  Hold further lupron unless further bleeding issues  Cont progestin as directed  D and c with hysteroscopy in 3 months  birht control issues revieweed    100% care coordinatin and counseling x 10 mnin          Mata Thomas MD  [facility]  [address] [place] [address]  Office : [phone]  Fax : [phone]

## 2022-07-13 ENCOUNTER — OFFICE VISIT (OUTPATIENT)
Dept: ONCOLOGY | Age: 37
End: 2022-07-13
Payer: COMMERCIAL

## 2022-07-13 ENCOUNTER — HOSPITAL ENCOUNTER (OUTPATIENT)
Dept: LAB | Age: 37
Discharge: HOME | End: 2022-07-16
Payer: COMMERCIAL

## 2022-07-13 VITALS
DIASTOLIC BLOOD PRESSURE: 77 MMHG | BODY MASS INDEX: 40.75 KG/M2 | RESPIRATION RATE: 14 BRPM | SYSTOLIC BLOOD PRESSURE: 140 MMHG | TEMPERATURE: 98.6 F | WEIGHT: 230 LBS | OXYGEN SATURATION: 100 % | HEIGHT: 63 IN | HEART RATE: 76 BPM

## 2022-07-13 DIAGNOSIS — D50.0 ANEMIA DUE TO CHRONIC BLOOD LOSS: ICD-10-CM

## 2022-07-13 DIAGNOSIS — D21.9 FIBROIDS: ICD-10-CM

## 2022-07-13 DIAGNOSIS — N85.02 ENDOMETRIAL HYPERPLASIA WITH ATYPIA: Primary | ICD-10-CM

## 2022-07-13 DIAGNOSIS — N92.0 MENORRHAGIA WITH REGULAR CYCLE: ICD-10-CM

## 2022-07-13 LAB
ABO + RH BLD: NORMAL
BASOPHILS # BLD: 0 K/UL (ref 0–0.2)
BASOPHILS NFR BLD: 0 % (ref 0–2)
BLOOD GROUP ANTIBODIES SERPL: NORMAL
DIFFERENTIAL METHOD BLD: ABNORMAL
EOSINOPHIL # BLD: 0.2 K/UL (ref 0–0.8)
EOSINOPHIL NFR BLD: 3 % (ref 0.5–7.8)
ERYTHROCYTE [DISTWIDTH] IN BLOOD BY AUTOMATED COUNT: 21.4 % (ref 11.9–14.6)
HCT VFR BLD AUTO: 33.4 % (ref 35.8–46.3)
HGB BLD-MCNC: 10.2 G/DL (ref 11.7–15.4)
IMM GRANULOCYTES # BLD AUTO: 0 K/UL (ref 0–0.5)
IMM GRANULOCYTES NFR BLD AUTO: 0 % (ref 0–5)
LYMPHOCYTES # BLD: 1.3 K/UL (ref 0.5–4.6)
LYMPHOCYTES NFR BLD: 21 % (ref 13–44)
MCH RBC QN AUTO: 25.7 PG (ref 26.1–32.9)
MCHC RBC AUTO-ENTMCNC: 30.5 G/DL (ref 31.4–35)
MCV RBC AUTO: 84.1 FL (ref 79.6–97.8)
MONOCYTES # BLD: 0.2 K/UL (ref 0.1–1.3)
MONOCYTES NFR BLD: 4 % (ref 4–12)
NEUTS SEG # BLD: 4.4 K/UL (ref 1.7–8.2)
NEUTS SEG NFR BLD: 72 % (ref 43–78)
NRBC # BLD: 0 K/UL (ref 0–0.2)
PLATELET # BLD AUTO: 547 K/UL (ref 150–450)
PMV BLD AUTO: 10.7 FL (ref 9.4–12.3)
RBC # BLD AUTO: 3.97 M/UL (ref 4.05–5.2)
SPECIMEN EXP DATE BLD: NORMAL
WBC # BLD AUTO: 6.1 K/UL (ref 4.3–11.1)

## 2022-07-13 PROCEDURE — 36415 COLL VENOUS BLD VENIPUNCTURE: CPT

## 2022-07-13 PROCEDURE — 86901 BLOOD TYPING SEROLOGIC RH(D): CPT

## 2022-07-13 PROCEDURE — 85025 COMPLETE CBC W/AUTO DIFF WBC: CPT

## 2022-07-13 PROCEDURE — 99212 OFFICE O/P EST SF 10 MIN: CPT | Performed by: OBSTETRICS & GYNECOLOGY

## 2022-07-13 ASSESSMENT — PATIENT HEALTH QUESTIONNAIRE - PHQ9
SUM OF ALL RESPONSES TO PHQ QUESTIONS 1-9: 0
1. LITTLE INTEREST OR PLEASURE IN DOING THINGS: 0
SUM OF ALL RESPONSES TO PHQ QUESTIONS 1-9: 0
SUM OF ALL RESPONSES TO PHQ QUESTIONS 1-9: 0
SUM OF ALL RESPONSES TO PHQ9 QUESTIONS 1 & 2: 0
SUM OF ALL RESPONSES TO PHQ QUESTIONS 1-9: 0
2. FEELING DOWN, DEPRESSED OR HOPELESS: 0

## 2022-07-13 NOTE — PATIENT INSTRUCTIONS
Patient Instructions from Today's Visit    Reason for Visit:  Follow up    Diagnosis Information:  [email or web address]      Plan:  Continue your pill, Dedrickromán  Repeat D&C in 3 months    Follow Up: After     Recent Lab Results:  Hospital Outpatient Visit on 07/13/2022   Component Date Value Ref Range Status    WBC 07/13/2022 6.1  4.3 - 11.1 K/uL Final    RBC 07/13/2022 3.97* 4.05 - 5.2 M/uL Final    Hemoglobin 07/13/2022 10.2* 11.7 - 15.4 g/dL Final    Hematocrit 07/13/2022 33.4* 35.8 - 46.3 % Final    MCV 07/13/2022 84.1  79.6 - 97.8 FL Final    MCH 07/13/2022 25.7* 26.1 - 32.9 PG Final    MCHC 07/13/2022 30.5* 31.4 - 35.0 g/dL Final    RDW 07/13/2022 21.4* 11.9 - 14.6 % Final    Platelets 05/07/1639 547* 150 - 450 K/uL Final    MPV 07/13/2022 10.7  9.4 - 12.3 FL Final    nRBC 07/13/2022 0.00  0.0 - 0.2 K/uL Final    **Note: Absolute NRBC parameter is now reported with Hemogram**    Differential Type 07/13/2022 AUTOMATED    Final    Seg Neutrophils 07/13/2022 72  43 - 78 % Final    Lymphocytes 07/13/2022 21  13 - 44 % Final    Monocytes 07/13/2022 4  4.0 - 12.0 % Final    Eosinophils % 07/13/2022 3  0.5 - 7.8 % Final    Basophils 07/13/2022 0  0.0 - 2.0 % Final    Immature Granulocytes 07/13/2022 0  0.0 - 5.0 % Final    Segs Absolute 07/13/2022 4.4  1.7 - 8.2 K/UL Final    Absolute Lymph # 07/13/2022 1.3  0.5 - 4.6 K/UL Final    Absolute Mono # 07/13/2022 0.2  0.1 - 1.3 K/UL Final    Absolute Eos # 07/13/2022 0.2  0.0 - 0.8 K/UL Final    Basophils Absolute 07/13/2022 0.0  0.0 - 0.2 K/UL Final    Absolute Immature Granulocyte 07/13/2022 0.0  0.0 - 0.5 K/UL Final       Treatment Summary has been discussed and given to patient: n/a        -------------------------------------------------------------------------------------------------------------------     Patient does express an interest in My Chart.   My Chart log in information explained on the after visit summary printout at the [facility] desk.     Saul Kenney RN, MSN, OCN  Gynecology Nurse Navigator for Dr. Gray [address]  Phone:  [phone]  Fax: [phone]  Email: [email or web address]

## 2022-07-14 ENCOUNTER — HOSPITAL ENCOUNTER (OUTPATIENT)
Dept: INFUSION THERAPY | Age: 37
End: 2022-07-14

## 2022-07-21 ENCOUNTER — PREP FOR PROCEDURE (OUTPATIENT)
Dept: ONCOLOGY | Age: 37
End: 2022-07-21

## 2022-07-23 ENCOUNTER — HOSPITAL ENCOUNTER (OUTPATIENT)
Dept: INFUSION THERAPY | Age: 37
Discharge: HOME | End: 2022-07-23
Payer: COMMERCIAL

## 2022-07-23 VITALS
SYSTOLIC BLOOD PRESSURE: 140 MMHG | HEART RATE: 72 BPM | TEMPERATURE: 98.2 F | DIASTOLIC BLOOD PRESSURE: 80 MMHG | RESPIRATION RATE: 16 BRPM | OXYGEN SATURATION: 100 %

## 2022-07-23 DIAGNOSIS — N92.0 MENORRHAGIA WITH REGULAR CYCLE: Primary | ICD-10-CM

## 2022-07-23 DIAGNOSIS — D50.0 ANEMIA DUE TO CHRONIC BLOOD LOSS: ICD-10-CM

## 2022-07-23 PROCEDURE — 6360000002 HC RX W HCPCS: Performed by: OBSTETRICS & GYNECOLOGY

## 2022-07-23 PROCEDURE — 96365 THER/PROPH/DIAG IV INF INIT: CPT

## 2022-07-23 PROCEDURE — 2580000003 HC RX 258: Performed by: OBSTETRICS & GYNECOLOGY

## 2022-07-23 RX ORDER — SODIUM CHLORIDE 9 MG/ML
INJECTION, SOLUTION INTRAVENOUS CONTINUOUS
Status: CANCELLED | OUTPATIENT
Start: 2022-07-30

## 2022-07-23 RX ORDER — SODIUM CHLORIDE 9 MG/ML
5-250 INJECTION, SOLUTION INTRAVENOUS PRN
Status: CANCELLED | OUTPATIENT
Start: 2022-07-30

## 2022-07-23 RX ORDER — SODIUM CHLORIDE 9 MG/ML
5-250 INJECTION, SOLUTION INTRAVENOUS PRN
Status: DISCONTINUED | OUTPATIENT
Start: 2022-07-23 | End: 2022-07-24 | Stop reason: HOSPADM

## 2022-07-23 RX ORDER — ALBUTEROL SULFATE 90 UG/1
4 AEROSOL, METERED RESPIRATORY (INHALATION) PRN
Status: CANCELLED | OUTPATIENT
Start: 2022-07-30

## 2022-07-23 RX ORDER — DIPHENHYDRAMINE HYDROCHLORIDE 50 MG/ML
50 INJECTION INTRAMUSCULAR; INTRAVENOUS
Status: CANCELLED | OUTPATIENT
Start: 2022-07-30

## 2022-07-23 RX ORDER — SODIUM CHLORIDE 0.9 % (FLUSH) 0.9 %
5-40 SYRINGE (ML) INJECTION PRN
Status: DISCONTINUED | OUTPATIENT
Start: 2022-07-23 | End: 2022-07-24 | Stop reason: HOSPADM

## 2022-07-23 RX ORDER — HEPARIN SODIUM (PORCINE) LOCK FLUSH IV SOLN 100 UNIT/ML 100 UNIT/ML
500 SOLUTION INTRAVENOUS PRN
Status: CANCELLED | OUTPATIENT
Start: 2022-07-30

## 2022-07-23 RX ORDER — EPINEPHRINE 1 MG/ML
0.3 INJECTION, SOLUTION, CONCENTRATE INTRAVENOUS PRN
Status: CANCELLED | OUTPATIENT
Start: 2022-07-30

## 2022-07-23 RX ORDER — ACETAMINOPHEN 325 MG/1
650 TABLET ORAL
Status: CANCELLED | OUTPATIENT
Start: 2022-07-30

## 2022-07-23 RX ORDER — ONDANSETRON 2 MG/ML
8 INJECTION INTRAMUSCULAR; INTRAVENOUS
Status: CANCELLED | OUTPATIENT
Start: 2022-07-30

## 2022-07-23 RX ORDER — SODIUM CHLORIDE 0.9 % (FLUSH) 0.9 %
5-40 SYRINGE (ML) INJECTION PRN
Status: CANCELLED | OUTPATIENT
Start: 2022-07-30

## 2022-07-23 RX ADMIN — SODIUM CHLORIDE 50 ML/HR: 9 INJECTION, SOLUTION INTRAVENOUS at 09:25

## 2022-07-23 RX ADMIN — SODIUM CHLORIDE, PRESERVATIVE FREE 10 ML: 5 INJECTION INTRAVENOUS at 09:10

## 2022-07-23 RX ADMIN — SODIUM CHLORIDE 125 MG: 9 INJECTION, SOLUTION INTRAVENOUS at 09:30

## 2022-07-30 ENCOUNTER — HOSPITAL ENCOUNTER (OUTPATIENT)
Dept: INFUSION THERAPY | Age: 37
Discharge: HOME | End: 2022-07-30
Payer: COMMERCIAL

## 2022-07-30 VITALS
DIASTOLIC BLOOD PRESSURE: 91 MMHG | HEART RATE: 66 BPM | TEMPERATURE: 98.1 F | OXYGEN SATURATION: 99 % | SYSTOLIC BLOOD PRESSURE: 157 MMHG | RESPIRATION RATE: 18 BRPM

## 2022-07-30 DIAGNOSIS — N92.0 MENORRHAGIA WITH REGULAR CYCLE: Primary | ICD-10-CM

## 2022-07-30 DIAGNOSIS — D50.0 ANEMIA DUE TO CHRONIC BLOOD LOSS: ICD-10-CM

## 2022-07-30 PROCEDURE — 96365 THER/PROPH/DIAG IV INF INIT: CPT

## 2022-07-30 PROCEDURE — 2580000003 HC RX 258: Performed by: OBSTETRICS & GYNECOLOGY

## 2022-07-30 PROCEDURE — 6360000002 HC RX W HCPCS: Performed by: OBSTETRICS & GYNECOLOGY

## 2022-07-30 RX ORDER — DIPHENHYDRAMINE HYDROCHLORIDE 50 MG/ML
50 INJECTION INTRAMUSCULAR; INTRAVENOUS
Status: CANCELLED | OUTPATIENT
Start: 2022-08-06

## 2022-07-30 RX ORDER — ALBUTEROL SULFATE 90 UG/1
4 AEROSOL, METERED RESPIRATORY (INHALATION) PRN
Status: CANCELLED | OUTPATIENT
Start: 2022-08-06

## 2022-07-30 RX ORDER — ACETAMINOPHEN 325 MG/1
650 TABLET ORAL
Status: CANCELLED | OUTPATIENT
Start: 2022-08-06

## 2022-07-30 RX ORDER — SODIUM CHLORIDE 0.9 % (FLUSH) 0.9 %
5-40 SYRINGE (ML) INJECTION PRN
Status: DISCONTINUED | OUTPATIENT
Start: 2022-07-30 | End: 2022-07-31 | Stop reason: HOSPADM

## 2022-07-30 RX ORDER — SODIUM CHLORIDE 0.9 % (FLUSH) 0.9 %
5-40 SYRINGE (ML) INJECTION PRN
Status: CANCELLED | OUTPATIENT
Start: 2022-08-06

## 2022-07-30 RX ORDER — SODIUM CHLORIDE 9 MG/ML
5-250 INJECTION, SOLUTION INTRAVENOUS PRN
Status: DISCONTINUED | OUTPATIENT
Start: 2022-07-30 | End: 2022-07-31 | Stop reason: HOSPADM

## 2022-07-30 RX ORDER — SODIUM CHLORIDE 9 MG/ML
5-250 INJECTION, SOLUTION INTRAVENOUS PRN
Status: CANCELLED | OUTPATIENT
Start: 2022-08-06

## 2022-07-30 RX ORDER — HEPARIN SODIUM (PORCINE) LOCK FLUSH IV SOLN 100 UNIT/ML 100 UNIT/ML
500 SOLUTION INTRAVENOUS PRN
Status: CANCELLED | OUTPATIENT
Start: 2022-08-06

## 2022-07-30 RX ORDER — SODIUM CHLORIDE 9 MG/ML
INJECTION, SOLUTION INTRAVENOUS CONTINUOUS
Status: CANCELLED | OUTPATIENT
Start: 2022-08-06

## 2022-07-30 RX ORDER — ONDANSETRON 2 MG/ML
8 INJECTION INTRAMUSCULAR; INTRAVENOUS
Status: CANCELLED | OUTPATIENT
Start: 2022-08-06

## 2022-07-30 RX ORDER — EPINEPHRINE 1 MG/ML
0.3 INJECTION, SOLUTION, CONCENTRATE INTRAVENOUS PRN
Status: CANCELLED | OUTPATIENT
Start: 2022-08-06

## 2022-07-30 RX ADMIN — SODIUM CHLORIDE, PRESERVATIVE FREE 10 ML: 5 INJECTION INTRAVENOUS at 11:11

## 2022-07-30 RX ADMIN — SODIUM CHLORIDE 75 ML/HR: 9 INJECTION, SOLUTION INTRAVENOUS at 09:17

## 2022-07-30 RX ADMIN — SODIUM CHLORIDE 125 MG: 9 INJECTION, SOLUTION INTRAVENOUS at 09:40

## 2022-07-30 NOTE — PROGRESS NOTES
[facility] Ferrlecit completed. Patient tolerated well. Any issues or concerns during appointment: none. Patient aware of next infusion appointment on 08/06. Patient instructed to call provider with temperature of 100.4 or greater or nausea/vomiting/ diarrhea or pain not controlled by medications. Discharged ambulatory.

## 2022-08-06 ENCOUNTER — HOSPITAL ENCOUNTER (OUTPATIENT)
Dept: INFUSION THERAPY | Age: 37
Discharge: HOME | End: 2022-08-06
Payer: COMMERCIAL

## 2022-08-06 VITALS
OXYGEN SATURATION: 99 % | WEIGHT: 231.8 LBS | DIASTOLIC BLOOD PRESSURE: 90 MMHG | TEMPERATURE: 98.5 F | HEART RATE: 70 BPM | RESPIRATION RATE: 18 BRPM | BODY MASS INDEX: 41.06 KG/M2 | SYSTOLIC BLOOD PRESSURE: 148 MMHG

## 2022-08-06 DIAGNOSIS — D50.0 ANEMIA DUE TO CHRONIC BLOOD LOSS: ICD-10-CM

## 2022-08-06 DIAGNOSIS — N92.0 MENORRHAGIA WITH REGULAR CYCLE: Primary | ICD-10-CM

## 2022-08-06 PROCEDURE — 6360000002 HC RX W HCPCS: Performed by: OBSTETRICS & GYNECOLOGY

## 2022-08-06 PROCEDURE — 96365 THER/PROPH/DIAG IV INF INIT: CPT

## 2022-08-06 PROCEDURE — 2580000003 HC RX 258: Performed by: OBSTETRICS & GYNECOLOGY

## 2022-08-06 RX ORDER — SODIUM CHLORIDE 9 MG/ML
INJECTION, SOLUTION INTRAVENOUS CONTINUOUS
Status: CANCELLED | OUTPATIENT
Start: 2022-08-13

## 2022-08-06 RX ORDER — SODIUM CHLORIDE 0.9 % (FLUSH) 0.9 %
5-40 SYRINGE (ML) INJECTION PRN
Status: CANCELLED | OUTPATIENT
Start: 2022-08-13

## 2022-08-06 RX ORDER — HEPARIN SODIUM (PORCINE) LOCK FLUSH IV SOLN 100 UNIT/ML 100 UNIT/ML
500 SOLUTION INTRAVENOUS PRN
Status: CANCELLED | OUTPATIENT
Start: 2022-08-13

## 2022-08-06 RX ORDER — SODIUM CHLORIDE 0.9 % (FLUSH) 0.9 %
5-40 SYRINGE (ML) INJECTION PRN
Status: DISCONTINUED | OUTPATIENT
Start: 2022-08-06 | End: 2022-08-07 | Stop reason: HOSPADM

## 2022-08-06 RX ORDER — DIPHENHYDRAMINE HYDROCHLORIDE 50 MG/ML
50 INJECTION INTRAMUSCULAR; INTRAVENOUS
Status: CANCELLED | OUTPATIENT
Start: 2022-08-13

## 2022-08-06 RX ORDER — SODIUM CHLORIDE 9 MG/ML
5-250 INJECTION, SOLUTION INTRAVENOUS PRN
Status: CANCELLED | OUTPATIENT
Start: 2022-08-13

## 2022-08-06 RX ORDER — SODIUM CHLORIDE 9 MG/ML
5-250 INJECTION, SOLUTION INTRAVENOUS PRN
Status: DISCONTINUED | OUTPATIENT
Start: 2022-08-06 | End: 2022-08-07 | Stop reason: HOSPADM

## 2022-08-06 RX ORDER — ALBUTEROL SULFATE 90 UG/1
4 AEROSOL, METERED RESPIRATORY (INHALATION) PRN
Status: CANCELLED | OUTPATIENT
Start: 2022-08-13

## 2022-08-06 RX ORDER — ONDANSETRON 2 MG/ML
8 INJECTION INTRAMUSCULAR; INTRAVENOUS
Status: CANCELLED | OUTPATIENT
Start: 2022-08-13

## 2022-08-06 RX ORDER — EPINEPHRINE 1 MG/ML
0.3 INJECTION, SOLUTION, CONCENTRATE INTRAVENOUS PRN
Status: CANCELLED | OUTPATIENT
Start: 2022-08-13

## 2022-08-06 RX ORDER — ACETAMINOPHEN 325 MG/1
650 TABLET ORAL
Status: CANCELLED | OUTPATIENT
Start: 2022-08-13

## 2022-08-06 RX ADMIN — SODIUM CHLORIDE, PRESERVATIVE FREE 10 ML: 5 INJECTION INTRAVENOUS at 11:20

## 2022-08-06 RX ADMIN — SODIUM CHLORIDE, PRESERVATIVE FREE 10 ML: 5 INJECTION INTRAVENOUS at 09:25

## 2022-08-06 RX ADMIN — SODIUM CHLORIDE 125 MG: 9 INJECTION, SOLUTION INTRAVENOUS at 09:49

## 2022-08-06 RX ADMIN — SODIUM CHLORIDE 100 ML/HR: 9 INJECTION, SOLUTION INTRAVENOUS at 09:25

## 2022-08-06 NOTE — PROGRESS NOTES
Arrived to the [facility]. mathew completed. Patient tolerated without difficulty. Any issues or concerns during appointment: None. Patient aware of next infusion appointment on August 13 (date) at 0900 (time). Patient instructed to call provider with temperature of 100.4 or greater or nausea/vomiting/ diarrhea or pain not controlled by medications  Discharged ambulatory.

## 2022-08-13 ENCOUNTER — HOSPITAL ENCOUNTER (OUTPATIENT)
Dept: INFUSION THERAPY | Age: 37
Discharge: HOME | End: 2022-08-13
Payer: COMMERCIAL

## 2022-08-13 VITALS
HEART RATE: 66 BPM | OXYGEN SATURATION: 99 % | TEMPERATURE: 98.2 F | SYSTOLIC BLOOD PRESSURE: 152 MMHG | RESPIRATION RATE: 16 BRPM | DIASTOLIC BLOOD PRESSURE: 94 MMHG

## 2022-08-13 DIAGNOSIS — D50.0 ANEMIA DUE TO CHRONIC BLOOD LOSS: ICD-10-CM

## 2022-08-13 DIAGNOSIS — N92.0 MENORRHAGIA WITH REGULAR CYCLE: Primary | ICD-10-CM

## 2022-08-13 PROCEDURE — 2580000003 HC RX 258: Performed by: OBSTETRICS & GYNECOLOGY

## 2022-08-13 PROCEDURE — 96365 THER/PROPH/DIAG IV INF INIT: CPT

## 2022-08-13 PROCEDURE — 6360000002 HC RX W HCPCS: Performed by: OBSTETRICS & GYNECOLOGY

## 2022-08-13 RX ORDER — SODIUM CHLORIDE 9 MG/ML
5-250 INJECTION, SOLUTION INTRAVENOUS PRN
OUTPATIENT
Start: 2022-08-20

## 2022-08-13 RX ORDER — ALBUTEROL SULFATE 90 UG/1
4 AEROSOL, METERED RESPIRATORY (INHALATION) PRN
OUTPATIENT
Start: 2022-08-20

## 2022-08-13 RX ORDER — ACETAMINOPHEN 325 MG/1
650 TABLET ORAL
OUTPATIENT
Start: 2022-08-20

## 2022-08-13 RX ORDER — EPINEPHRINE 1 MG/ML
0.3 INJECTION, SOLUTION, CONCENTRATE INTRAVENOUS PRN
OUTPATIENT
Start: 2022-08-20

## 2022-08-13 RX ORDER — DIPHENHYDRAMINE HYDROCHLORIDE 50 MG/ML
50 INJECTION INTRAMUSCULAR; INTRAVENOUS
OUTPATIENT
Start: 2022-08-20

## 2022-08-13 RX ORDER — SODIUM CHLORIDE 9 MG/ML
5-250 INJECTION, SOLUTION INTRAVENOUS PRN
Status: CANCELLED | OUTPATIENT
Start: 2022-08-20

## 2022-08-13 RX ORDER — HEPARIN SODIUM (PORCINE) LOCK FLUSH IV SOLN 100 UNIT/ML 100 UNIT/ML
500 SOLUTION INTRAVENOUS PRN
OUTPATIENT
Start: 2022-08-20

## 2022-08-13 RX ORDER — SODIUM CHLORIDE 0.9 % (FLUSH) 0.9 %
5-40 SYRINGE (ML) INJECTION PRN
Status: DISCONTINUED | OUTPATIENT
Start: 2022-08-13 | End: 2022-08-14 | Stop reason: HOSPADM

## 2022-08-13 RX ORDER — SODIUM CHLORIDE 9 MG/ML
INJECTION, SOLUTION INTRAVENOUS CONTINUOUS
OUTPATIENT
Start: 2022-08-20

## 2022-08-13 RX ORDER — ONDANSETRON 2 MG/ML
8 INJECTION INTRAMUSCULAR; INTRAVENOUS
OUTPATIENT
Start: 2022-08-20

## 2022-08-13 RX ORDER — SODIUM CHLORIDE 0.9 % (FLUSH) 0.9 %
5-40 SYRINGE (ML) INJECTION PRN
Status: CANCELLED | OUTPATIENT
Start: 2022-08-20

## 2022-08-13 RX ORDER — SODIUM CHLORIDE 9 MG/ML
5-250 INJECTION, SOLUTION INTRAVENOUS PRN
Status: DISCONTINUED | OUTPATIENT
Start: 2022-08-13 | End: 2022-08-14 | Stop reason: HOSPADM

## 2022-08-13 RX ADMIN — SODIUM CHLORIDE, PRESERVATIVE FREE 10 ML: 5 INJECTION INTRAVENOUS at 09:38

## 2022-08-13 RX ADMIN — SODIUM CHLORIDE 25 ML/HR: 9 INJECTION, SOLUTION INTRAVENOUS at 09:38

## 2022-08-13 RX ADMIN — SODIUM CHLORIDE 125 MG: 9 INJECTION, SOLUTION INTRAVENOUS at 09:41

## 2022-08-13 NOTE — PROGRESS NOTES
Arrived to the [facility]. Reinaldo completed. Patient tolerated well. Any issues or concerns during appointment: none. Patient instructed to call provider with temperature of 100.4 or greater or nausea/vomiting/ diarrhea or pain not controlled by medications  Discharged ambulatory.

## 2022-08-20 ENCOUNTER — HOSPITAL ENCOUNTER (OUTPATIENT)
Dept: INFUSION THERAPY | Age: 37
Discharge: HOME | End: 2022-08-20
Payer: COMMERCIAL

## 2022-08-20 VITALS
DIASTOLIC BLOOD PRESSURE: 92 MMHG | SYSTOLIC BLOOD PRESSURE: 146 MMHG | TEMPERATURE: 98.6 F | RESPIRATION RATE: 18 BRPM | OXYGEN SATURATION: 100 % | HEART RATE: 76 BPM

## 2022-08-20 DIAGNOSIS — N92.0 MENORRHAGIA WITH REGULAR CYCLE: Primary | ICD-10-CM

## 2022-08-20 DIAGNOSIS — D50.0 ANEMIA DUE TO CHRONIC BLOOD LOSS: ICD-10-CM

## 2022-08-20 PROCEDURE — 96365 THER/PROPH/DIAG IV INF INIT: CPT

## 2022-08-20 PROCEDURE — 2580000003 HC RX 258: Performed by: OBSTETRICS & GYNECOLOGY

## 2022-08-20 PROCEDURE — 6360000002 HC RX W HCPCS: Performed by: OBSTETRICS & GYNECOLOGY

## 2022-08-20 RX ORDER — ALBUTEROL SULFATE 90 UG/1
4 AEROSOL, METERED RESPIRATORY (INHALATION) PRN
OUTPATIENT
Start: 2022-08-27

## 2022-08-20 RX ORDER — SODIUM CHLORIDE 9 MG/ML
5-250 INJECTION, SOLUTION INTRAVENOUS PRN
OUTPATIENT
Start: 2022-08-27

## 2022-08-20 RX ORDER — SODIUM CHLORIDE 9 MG/ML
INJECTION, SOLUTION INTRAVENOUS CONTINUOUS
OUTPATIENT
Start: 2022-08-27

## 2022-08-20 RX ORDER — SODIUM CHLORIDE 0.9 % (FLUSH) 0.9 %
5-40 SYRINGE (ML) INJECTION PRN
OUTPATIENT
Start: 2022-08-27

## 2022-08-20 RX ORDER — HEPARIN SODIUM (PORCINE) LOCK FLUSH IV SOLN 100 UNIT/ML 100 UNIT/ML
500 SOLUTION INTRAVENOUS PRN
OUTPATIENT
Start: 2022-08-27

## 2022-08-20 RX ORDER — ONDANSETRON 2 MG/ML
8 INJECTION INTRAMUSCULAR; INTRAVENOUS
OUTPATIENT
Start: 2022-08-27

## 2022-08-20 RX ORDER — ACETAMINOPHEN 325 MG/1
650 TABLET ORAL
OUTPATIENT
Start: 2022-08-27

## 2022-08-20 RX ORDER — DIPHENHYDRAMINE HYDROCHLORIDE 50 MG/ML
50 INJECTION INTRAMUSCULAR; INTRAVENOUS
OUTPATIENT
Start: 2022-08-27

## 2022-08-20 RX ORDER — SODIUM CHLORIDE 9 MG/ML
5-250 INJECTION, SOLUTION INTRAVENOUS PRN
Status: DISCONTINUED | OUTPATIENT
Start: 2022-08-20 | End: 2022-08-21 | Stop reason: HOSPADM

## 2022-08-20 RX ORDER — SODIUM CHLORIDE 0.9 % (FLUSH) 0.9 %
5-40 SYRINGE (ML) INJECTION PRN
Status: DISCONTINUED | OUTPATIENT
Start: 2022-08-20 | End: 2022-08-21 | Stop reason: HOSPADM

## 2022-08-20 RX ORDER — EPINEPHRINE 1 MG/ML
0.3 INJECTION, SOLUTION, CONCENTRATE INTRAVENOUS PRN
OUTPATIENT
Start: 2022-08-27

## 2022-08-20 RX ADMIN — SODIUM CHLORIDE 50 ML/HR: 9 INJECTION, SOLUTION INTRAVENOUS at 09:50

## 2022-08-20 RX ADMIN — SODIUM CHLORIDE 125 MG: 9 INJECTION, SOLUTION INTRAVENOUS at 10:05

## 2022-08-20 RX ADMIN — SODIUM CHLORIDE, PRESERVATIVE FREE 10 ML: 5 INJECTION INTRAVENOUS at 11:37

## 2022-08-20 NOTE — PROGRESS NOTES
Arrived to the [facility]. Reinaldo completed. Patient tolerated well. Any issues or concerns during appointment: none. Patient instructed to call provider with temperature of 100.4 or greater or nausea/vomiting/ diarrhea or pain not controlled by medications. Discharged ambulatory.

## 2022-09-28 ENCOUNTER — PREP FOR PROCEDURE (OUTPATIENT)
Dept: ONCOLOGY | Age: 37
End: 2022-09-28

## 2022-09-28 RX ORDER — SODIUM CHLORIDE 0.9 % (FLUSH) 0.9 %
5-40 SYRINGE (ML) INJECTION EVERY 12 HOURS SCHEDULED
Status: CANCELLED | OUTPATIENT
Start: 2022-09-28

## 2022-09-28 RX ORDER — SODIUM CHLORIDE 9 MG/ML
INJECTION, SOLUTION INTRAVENOUS PRN
Status: CANCELLED | OUTPATIENT
Start: 2022-09-28

## 2022-09-28 RX ORDER — SODIUM CHLORIDE 0.9 % (FLUSH) 0.9 %
5-40 SYRINGE (ML) INJECTION PRN
Status: CANCELLED | OUTPATIENT
Start: 2022-09-28

## 2022-09-30 RX ORDER — DOXYCYCLINE HYCLATE 50 MG/1
324 CAPSULE, GELATIN COATED ORAL NIGHTLY
COMMUNITY

## 2022-09-30 NOTE — PERIOP NOTE
Patient verified name and . Order for consent not found in EHR; patient verifies procedure. Type 1B surgery, Phone assessment complete. Orders not received. Labs per surgeon: [address] per anesthesia protocol: H/H ordered DOS per surgeon orders      Patient answered medical/surgical history questions at their best of ability. All prior to admission medications documented in [facility]. Patient instructed to take the following medications the day of surgery according to anesthesia guidelines with a small sip of water: none. Hold all vitamins 7 days prior to surgery and NSAIDS 5 days prior to surgery. Prescription meds to hold:Ibuprofen. Patient instructed on the following:    > Arrive at [facility], time of arrival to be called the day before by 1700  > NPO after midnight, unless otherwise indicated, including gum, mints, and ice chips  > Responsible adult must drive patient to the hospital, stay during surgery, and patient will need supervision 24 hours after anesthesia.  > Use antibacterial Soap in shower the night before surgery and on the morning of surgery  > All piercings must be removed prior to arrival.    > Leave all valuables (money and jewelry) at home but bring insurance card and ID on DOS.   > You may be required to pay a deductible or co-pay on the day of your procedure. You can pre-pay by calling [phone] if your surgery is at the [facility] or [phone] if your surgery is at the [facility] ED. > Do not wear make-up, nail polish, lotions, cologne, perfumes, powders, or oil on skin. Artificial nails are not permitted.

## 2022-10-03 ENCOUNTER — ANESTHESIA EVENT (OUTPATIENT)
Dept: SURGERY | Age: 37
End: 2022-10-03
Payer: COMMERCIAL

## 2022-10-04 ENCOUNTER — HOSPITAL ENCOUNTER (OUTPATIENT)
Age: 37
Setting detail: OUTPATIENT SURGERY
Discharge: HOME | End: 2022-10-04
Attending: OBSTETRICS & GYNECOLOGY | Admitting: OBSTETRICS & GYNECOLOGY
Payer: COMMERCIAL

## 2022-10-04 ENCOUNTER — ANESTHESIA (OUTPATIENT)
Dept: SURGERY | Age: 37
End: 2022-10-04
Payer: COMMERCIAL

## 2022-10-04 VITALS
HEIGHT: 63 IN | WEIGHT: 220 LBS | SYSTOLIC BLOOD PRESSURE: 161 MMHG | TEMPERATURE: 98 F | DIASTOLIC BLOOD PRESSURE: 99 MMHG | RESPIRATION RATE: 16 BRPM | BODY MASS INDEX: 38.98 KG/M2 | OXYGEN SATURATION: 100 % | HEART RATE: 76 BPM

## 2022-10-04 DIAGNOSIS — N85.02 ENDOMETRIAL HYPERPLASIA WITH ATYPIA: Primary | ICD-10-CM

## 2022-10-04 LAB
HCG UR QL: NEGATIVE
HCT VFR BLD AUTO: 37 % (ref 35.8–46.3)
HGB BLD-MCNC: 11 G/DL (ref 11.7–15.4)

## 2022-10-04 PROCEDURE — 6370000000 HC RX 637 (ALT 250 FOR IP): Performed by: ANESTHESIOLOGY

## 2022-10-04 PROCEDURE — 2580000003 HC RX 258: Performed by: ANESTHESIOLOGY

## 2022-10-04 PROCEDURE — 81025 URINE PREGNANCY TEST: CPT

## 2022-10-04 PROCEDURE — 3600000003 HC SURGERY LEVEL 3 BASE: Performed by: OBSTETRICS & GYNECOLOGY

## 2022-10-04 PROCEDURE — 6360000002 HC RX W HCPCS: Performed by: REGISTERED NURSE

## 2022-10-04 PROCEDURE — 7100000001 HC PACU RECOVERY - ADDTL 15 MIN: Performed by: OBSTETRICS & GYNECOLOGY

## 2022-10-04 PROCEDURE — 88305 TISSUE EXAM BY PATHOLOGIST: CPT

## 2022-10-04 PROCEDURE — 7100000011 HC PHASE II RECOVERY - ADDTL 15 MIN: Performed by: OBSTETRICS & GYNECOLOGY

## 2022-10-04 PROCEDURE — 3700000000 HC ANESTHESIA ATTENDED CARE: Performed by: OBSTETRICS & GYNECOLOGY

## 2022-10-04 PROCEDURE — 2500000003 HC RX 250 WO HCPCS: Performed by: REGISTERED NURSE

## 2022-10-04 PROCEDURE — 85018 HEMOGLOBIN: CPT

## 2022-10-04 PROCEDURE — 6360000002 HC RX W HCPCS: Performed by: OBSTETRICS & GYNECOLOGY

## 2022-10-04 PROCEDURE — 7100000000 HC PACU RECOVERY - FIRST 15 MIN: Performed by: OBSTETRICS & GYNECOLOGY

## 2022-10-04 PROCEDURE — 2709999900 HC NON-CHARGEABLE SUPPLY: Performed by: OBSTETRICS & GYNECOLOGY

## 2022-10-04 PROCEDURE — 3700000001 HC ADD 15 MINUTES (ANESTHESIA): Performed by: OBSTETRICS & GYNECOLOGY

## 2022-10-04 PROCEDURE — 3600000013 HC SURGERY LEVEL 3 ADDTL 15MIN: Performed by: OBSTETRICS & GYNECOLOGY

## 2022-10-04 PROCEDURE — 7100000010 HC PHASE II RECOVERY - FIRST 15 MIN: Performed by: OBSTETRICS & GYNECOLOGY

## 2022-10-04 RX ORDER — FENTANYL CITRATE 50 UG/ML
100 INJECTION, SOLUTION INTRAMUSCULAR; INTRAVENOUS
Status: DISCONTINUED | OUTPATIENT
Start: 2022-10-04 | End: 2022-10-04 | Stop reason: HOSPADM

## 2022-10-04 RX ORDER — DEXAMETHASONE SODIUM PHOSPHATE 4 MG/ML
INJECTION, SOLUTION INTRA-ARTICULAR; INTRALESIONAL; INTRAMUSCULAR; INTRAVENOUS; SOFT TISSUE PRN
Status: DISCONTINUED | OUTPATIENT
Start: 2022-10-04 | End: 2022-10-04 | Stop reason: SDUPTHER

## 2022-10-04 RX ORDER — PROCHLORPERAZINE MALEATE 10 MG
10 TABLET ORAL EVERY 6 HOURS PRN
Qty: 120 TABLET | Refills: 3 | Status: SHIPPED | OUTPATIENT
Start: 2022-10-04

## 2022-10-04 RX ORDER — SODIUM CHLORIDE 0.9 % (FLUSH) 0.9 %
5-40 SYRINGE (ML) INJECTION PRN
Status: DISCONTINUED | OUTPATIENT
Start: 2022-10-04 | End: 2022-10-04 | Stop reason: HOSPADM

## 2022-10-04 RX ORDER — HYDROMORPHONE HYDROCHLORIDE 2 MG/ML
0.5 INJECTION, SOLUTION INTRAMUSCULAR; INTRAVENOUS; SUBCUTANEOUS EVERY 5 MIN PRN
Status: DISCONTINUED | OUTPATIENT
Start: 2022-10-04 | End: 2022-10-04 | Stop reason: HOSPADM

## 2022-10-04 RX ORDER — TRAMADOL HYDROCHLORIDE 50 MG/1
50 TABLET ORAL EVERY 6 HOURS PRN
Qty: 12 TABLET | Refills: 0 | Status: SHIPPED | OUTPATIENT
Start: 2022-10-04 | End: 2022-10-07

## 2022-10-04 RX ORDER — OXYCODONE HYDROCHLORIDE 5 MG/1
5 TABLET ORAL
Status: COMPLETED | OUTPATIENT
Start: 2022-10-04 | End: 2022-10-04

## 2022-10-04 RX ORDER — SODIUM CHLORIDE, SODIUM LACTATE, POTASSIUM CHLORIDE, CALCIUM CHLORIDE 600; 310; 30; 20 MG/100ML; MG/100ML; MG/100ML; MG/100ML
100 INJECTION, SOLUTION INTRAVENOUS CONTINUOUS
Status: DISCONTINUED | OUTPATIENT
Start: 2022-10-04 | End: 2022-10-04 | Stop reason: HOSPADM

## 2022-10-04 RX ORDER — PROCHLORPERAZINE EDISYLATE 5 MG/ML
5 INJECTION INTRAMUSCULAR; INTRAVENOUS
Status: DISCONTINUED | OUTPATIENT
Start: 2022-10-04 | End: 2022-10-04 | Stop reason: HOSPADM

## 2022-10-04 RX ORDER — KETOROLAC TROMETHAMINE 30 MG/ML
INJECTION, SOLUTION INTRAMUSCULAR; INTRAVENOUS PRN
Status: DISCONTINUED | OUTPATIENT
Start: 2022-10-04 | End: 2022-10-04 | Stop reason: SDUPTHER

## 2022-10-04 RX ORDER — ONDANSETRON 4 MG/1
4 TABLET, FILM COATED ORAL 3 TIMES DAILY PRN
Qty: 15 TABLET | Refills: 0 | Status: SHIPPED | OUTPATIENT
Start: 2022-10-04

## 2022-10-04 RX ORDER — LIDOCAINE HYDROCHLORIDE 20 MG/ML
INJECTION, SOLUTION EPIDURAL; INFILTRATION; INTRACAUDAL; PERINEURAL PRN
Status: DISCONTINUED | OUTPATIENT
Start: 2022-10-04 | End: 2022-10-04 | Stop reason: SDUPTHER

## 2022-10-04 RX ORDER — LIDOCAINE HYDROCHLORIDE 10 MG/ML
1 INJECTION, SOLUTION INFILTRATION; PERINEURAL
Status: DISCONTINUED | OUTPATIENT
Start: 2022-10-04 | End: 2022-10-04 | Stop reason: HOSPADM

## 2022-10-04 RX ORDER — MIDAZOLAM HYDROCHLORIDE 2 MG/2ML
2 INJECTION, SOLUTION INTRAMUSCULAR; INTRAVENOUS
Status: DISCONTINUED | OUTPATIENT
Start: 2022-10-04 | End: 2022-10-04 | Stop reason: HOSPADM

## 2022-10-04 RX ORDER — PROPOFOL 10 MG/ML
INJECTION, EMULSION INTRAVENOUS PRN
Status: DISCONTINUED | OUTPATIENT
Start: 2022-10-04 | End: 2022-10-04 | Stop reason: SDUPTHER

## 2022-10-04 RX ORDER — ONDANSETRON 2 MG/ML
INJECTION INTRAMUSCULAR; INTRAVENOUS PRN
Status: DISCONTINUED | OUTPATIENT
Start: 2022-10-04 | End: 2022-10-04 | Stop reason: SDUPTHER

## 2022-10-04 RX ORDER — SODIUM CHLORIDE 0.9 % (FLUSH) 0.9 %
5-40 SYRINGE (ML) INJECTION EVERY 12 HOURS SCHEDULED
Status: DISCONTINUED | OUTPATIENT
Start: 2022-10-04 | End: 2022-10-04 | Stop reason: HOSPADM

## 2022-10-04 RX ORDER — SODIUM CHLORIDE 9 MG/ML
INJECTION, SOLUTION INTRAVENOUS PRN
Status: DISCONTINUED | OUTPATIENT
Start: 2022-10-04 | End: 2022-10-04 | Stop reason: HOSPADM

## 2022-10-04 RX ORDER — MIDAZOLAM HYDROCHLORIDE 1 MG/ML
INJECTION INTRAMUSCULAR; INTRAVENOUS PRN
Status: DISCONTINUED | OUTPATIENT
Start: 2022-10-04 | End: 2022-10-04 | Stop reason: SDUPTHER

## 2022-10-04 RX ADMIN — PROPOFOL 200 MG: 10 INJECTION, EMULSION INTRAVENOUS at 07:59

## 2022-10-04 RX ADMIN — FENTANYL CITRATE 25 MCG: 50 INJECTION INTRAMUSCULAR; INTRAVENOUS at 08:03

## 2022-10-04 RX ADMIN — OXYCODONE 5 MG: 5 TABLET ORAL at 08:54

## 2022-10-04 RX ADMIN — LIDOCAINE HYDROCHLORIDE 60 MG: 20 INJECTION, SOLUTION EPIDURAL; INFILTRATION; INTRACAUDAL; PERINEURAL at 07:59

## 2022-10-04 RX ADMIN — MIDAZOLAM 2 MG: 1 INJECTION INTRAMUSCULAR; INTRAVENOUS at 07:54

## 2022-10-04 RX ADMIN — KETOROLAC TROMETHAMINE 30 MG: 30 INJECTION, SOLUTION INTRAMUSCULAR; INTRAVENOUS at 08:17

## 2022-10-04 RX ADMIN — ONDANSETRON 4 MG: 2 INJECTION INTRAMUSCULAR; INTRAVENOUS at 08:10

## 2022-10-04 RX ADMIN — DEXAMETHASONE SODIUM PHOSPHATE 10 MG: 4 INJECTION, SOLUTION INTRAMUSCULAR; INTRAVENOUS at 08:07

## 2022-10-04 RX ADMIN — SODIUM CHLORIDE, POTASSIUM CHLORIDE, SODIUM LACTATE AND CALCIUM CHLORIDE 100 ML/HR: 600; 310; 30; 20 INJECTION, SOLUTION INTRAVENOUS at 07:40

## 2022-10-04 RX ADMIN — Medication 2000 MG: at 08:07

## 2022-10-04 RX ADMIN — FENTANYL CITRATE 25 MCG: 50 INJECTION INTRAMUSCULAR; INTRAVENOUS at 08:07

## 2022-10-04 ASSESSMENT — PAIN DESCRIPTION - LOCATION: LOCATION: ABDOMEN

## 2022-10-04 ASSESSMENT — PAIN DESCRIPTION - DESCRIPTORS: DESCRIPTORS: ACHING

## 2022-10-04 ASSESSMENT — PAIN SCALES - GENERAL: PAINLEVEL_OUTOF10: 6

## 2022-10-04 ASSESSMENT — ENCOUNTER SYMPTOMS: RESPIRATORY NEGATIVE: 1

## 2022-10-04 NOTE — PROGRESS NOTES
Date: 7/13/2022        Patient Name: Jose Santos     YOB: 1985          Chief Complaint     Chief Complaint   Patient presents with    Follow-up    anemia, severe, noted on initial consult visit, transfused, July 2022   lupron 4 week dose, inpt., July 2022   Megace 80 bid (change from provera, July 2022)    endometrial hyperplasia, atypical   Pt reports episode in past  Taking provera, 20 daily    Large fibroids    Obesity    Abnormal bleeding   Many months, at least since last fall   Pt reports has continued bleeding daily since d and c with gyn    Reported 2 untis prbc at procedure. Wishes to preserve fertility    Has used some asa during this recent bleeding episode      History of Present Illness   Jose Santos is a 44 y.o. who presents today for scheduled visit    Noted long hx abnormal bleeding, and about 2 years ago reports episode hyperplasia requireing tx. Increasing bleding over at least six moths, eventually d and c with gyn, complex hyperplasia  Also at that time recived 2 u prbc    Reports continued bleeding since d and c, no knonwn h/h with gyn, taking provera 20 daily as directed, with increasing fatigue.     Past Medical History     Past Medical History:   Diagnosis Date    Fibroid     Iron deficiency anemia     Menorrhagia 2016    Thickened endometrium         Past Surgical History     Past Surgical History:   Procedure Laterality Date    APPENDECTOMY      CHOLECYSTECTOMY      DILATION AND CURETTAGE OF UTERUS  03/04/2016    By Dr. Noreen Nielsen N/A 06/15/2022    DILATATION AND CURETTAGE HYSTEROSCOPY performed by Claribel Yoder MD at [address] [place] [address]        Medications      Current Outpatient Medications:     megestrol (MEGACE) 40 MG tablet, Take 2 tablets by mouth 2 times daily, Disp: 120 tablet, Rfl: 3    ferrous gluconate (FERGON) 324 (38 Fe) MG tablet, Take 1 tablet by mouth daily (with breakfast), Disp: 30 tablet, Rfl: 3 ibuprofen (ADVIL;MOTRIN) 600 MG tablet, Take 1 tablet by mouth 4 times daily as needed for Pain, Disp: 40 tablet, Rfl: 0    ibuprofen (ADVIL;MOTRIN) 800 MG tablet, Take 1 tablet by mouth every 8 hours as needed for Pain (Patient not taking: Reported on 7/13/2022), Disp: 21 tablet, Rfl: 0    Ferrous Gluconate-C-Folic Acid (IRON-C PO), Take by mouth (Patient not taking: Reported on 7/13/2022), Disp: , Rfl:     medroxyPROGESTERone (PROVERA) 10 MG tablet, Take 1 tablet by mouth in the morning and at bedtime (Patient not taking: Reported on 7/13/2022), Disp: 30 tablet, Rfl: 3     Allergies   Patient has no known allergies. Social History     Social History       Tobacco History       Smoking Status  Never Smoker      Smokeless Tobacco Use  Never Used              Alcohol History       Alcohol Use Status  Not Currently              Drug Use       Drug Use Status  Never              Sexual Activity       Sexually Active  Yes Partners  Male                    Family History     Family History   Problem Relation Age of Onset    Endometriosis Sister        Review of Systems   Review of Systems   Respiratory: Negative. Cardiovascular: Negative. Genitourinary:  Negative for vaginal bleeding. All other systems reviewed and are negative. Physical Exam     ECOG PERFORMANCE STATUS -0    Blood pressure (!) 140/77, pulse 76, temperature 98.6 °F (37 °C), temperature source Oral, resp. rate 14, height 5' 3\" (1.6 m), weight 230 lb (104.3 kg), SpO2 100 %. Physical Exam  Vitals and nursing note reviewed. Exam conducted with a chaperone present. Cardiovascular:      Rate and Rhythm: Normal rate and regular rhythm. Pulmonary:      Effort: Pulmonary effort is normal. No respiratory distress.        Labs        Recent Results (from the past 48 hour(s))   CBC with Auto Differential    Collection Time: 07/13/22  2:01 PM   Result Value Ref Range    WBC 6.1 4.3 - 11.1 K/uL    RBC 3.97 (L) 4.05 - 5.2 M/uL    Hemoglobin 10.2 (L) 11.7 - 15.4 g/dL    Hematocrit 33.4 (L) 35.8 - 46.3 %    MCV 84.1 79.6 - 97.8 FL    MCH 25.7 (L) 26.1 - 32.9 PG    MCHC 30.5 (L) 31.4 - 35.0 g/dL    RDW 21.4 (H) 11.9 - 14.6 %    Platelets 491 (H) 485 - 450 K/uL    MPV 10.7 9.4 - 12.3 FL    nRBC 0.00 0.0 - 0.2 K/uL    Differential Type AUTOMATED      Seg Neutrophils 72 43 - 78 %    Lymphocytes 21 13 - 44 %    Monocytes 4 4.0 - 12.0 %    Eosinophils % 3 0.5 - 7.8 %    Basophils 0 0.0 - 2.0 %    Immature Granulocytes 0 0.0 - 5.0 %    Segs Absolute 4.4 1.7 - 8.2 K/UL    Absolute Lymph # 1.3 0.5 - 4.6 K/UL    Absolute Mono # 0.2 0.1 - 1.3 K/UL    Absolute Eos # 0.2 0.0 - 0.8 K/UL    Basophils Absolute 0.0 0.0 - 0.2 K/UL    Absolute Immature Granulocyte 0.0 0.0 - 0.5 K/UL        No results found for:      Pathology      Accession Number:   [id number]   MR #   [id number]   Date Obtained:   6/15/2022   DIAGNOSIS        A:  \" ENDOMETRIAL CURETTINGS\":         ENDOMETRIUM HAVING ENDOMETRIAL HYPERPLASIA, COMPLEX WITH FOCAL   ATYPIA             Sign Out Date: 6/17/2022  ZANDER Russ M.D. Imaging/Diagnostics Last 24 Hours   No results found. Radiology:    CT Result (most recent):  No results found for this or any previous visit from the past 3650 days. PET Results (most recent):  No results found for this or any previous visit from the past 365 days. Mammo Results (most recent):  No results found for this or any previous visit from the past 365 days. US Result (most recent):  US PELVIS COMPLETE 07/06/2022    Narrative  Clinical history: Menorrhagia and anemia. D and C on 6/15/2022. Negative  pregnancy test.    TECHNIQUE: Transabdominal pelvic ultrasound. COMPARISON: None    FINDINGS:    The uterus measures 18.2 x 11.5 x 13.3 cm. There is large uterine fibroid at the  fundus, extending from the myometrium to the endometrium. This measures about  8.7 x 7.6 x 6.9 cm. There is a small 2.6 cm intramural fibroid.  Nabothian cysts  are noted.    Endometrial echo complex measures 2.7 cm. Both ovaries are unremarkable. Left ovary measures 4.6 x 3.6 x 2.2 cm and the  right measures 3.7 x 2.8 x 2.6 cm. There is color Doppler flow. There is no significant free fluid. Impression  1. Large fibroid at the uterine fundus, extending from the myometrium to the  endometrium. This measures up to 8.7 cm. Assessment       Diagnosis Orders   1. Endometrial hyperplasia with atypia     2. Anemia due to chronic blood loss     3. Fibroids       Specialty Problems       None            Plan   1. Hysteroscopy d and as planned beginning summer.           Trent Benites MD  [place] [address]  Office : [phone]  Fax : [phone]

## 2022-10-04 NOTE — ANESTHESIA POSTPROCEDURE EVALUATION
Department of Anesthesiology  Postprocedure Note    Patient: Manasa Cardoza  MRN: [id number]  [place]: 1985  Date of evaluation: 10/4/2022      Procedure Summary     Date: 10/04/22 Room / Location: [facility] OP OR 02 / [facility]    Anesthesia Start: 1019 Anesthesia Stop: 8527    Procedure: DILATATION AND CURETTAGE HYSTEROSCOPY (Vagina ) Diagnosis:       Endometrial hyperplasia with atypia      (Endometrial hyperplasia with atypia [N85.02])    Surgeons: Gustavo Sanchez MD Responsible Provider: Kalee Bernal DO    Anesthesia Type: general ASA Status: 2          Anesthesia Type: No value filed.     Dany Phase I: Dany Score: 9    Dany Phase II: Dany Score: 10      Anesthesia Post Evaluation    Patient location during evaluation: PACU  Level of consciousness: awake and alert  Airway patency: patent  Nausea & Vomiting: no nausea  Complications: no  Cardiovascular status: blood pressure returned to baseline and hemodynamically stable  Respiratory status: acceptable  Hydration status: euvolemic

## 2022-10-04 NOTE — DISCHARGE INSTRUCTIONS
Hysteroscopy With Dilation and Curettage: What to Expect at [address]     For a hysteroscopy, your doctor guides a lighted tube through your cervix and into your uterus. This helps the doctor see inside your uterus. For a dilation and curettage (D&C), your doctor uses a curved tool, called a curette, to gently scrape tissue from your uterus. After these procedures, you are likely to have a backache or cramps similar to menstrual cramps. Expect to pass small clots of blood from your vagina for the first few days. You may have light vaginal bleeding for several weeks after the D&C. If the doctor filled your uterus with air, your belly may feel full. You may also have shoulder pain right after the procedure. You will probably be able to go back to most of your normal activities in 1 or 2 days. This care sheet gives you a general idea about how long it will take for you to recover. But each person recovers at a different pace. Follow the steps below to get better as quickly as possible. How can you care for yourself at home? Activity    Rest when you feel tired. You will probably be able to return to work the day after the procedure. But it depends on what was done and the type of work you do. You may have some light vaginal bleeding. Use sanitary pads until you stop bleeding. Using pads makes it easier to monitor your bleeding. Do not rinse your vagina with fluid (douche). Ask your doctor when it is okay for you to have sex. Diet    You can eat your normal diet. If your stomach is upset, try bland, low-fat foods like plain rice, broiled chicken, toast, and yogurt. Drink plenty of fluids (unless your doctor tells you not to). Medicines    Your doctor will tell you if and when you can restart your medicines. You will also get instructions about taking any new medicines.      If you stopped taking aspirin or some other blood thinner, your doctor will tell you when to start taking it again. Be safe with medicines. Read and follow all instructions on the label. If the doctor gave you a prescription medicine for pain, take it as prescribed. If you are not taking a prescription pain medicine, ask your doctor if you can take an over-the-counter medicine. If your doctor prescribed antibiotics, take them as directed. Do not stop taking them just because you feel better. You need to take the full course of antibiotics. Follow-up care is a key part of your treatment and safety. Be sure to make and go to all appointments, and call your doctor if you are having problems. It's also a good idea to know your test results and keep a list of the medicines you take. When should you call for help? Call 911 anytime you think you may need emergency care. For example, call if:    You passed out (lost consciousness). You have chest pain, are short of breath, or cough up blood. Call your doctor now or seek immediate medical care if:    You have pain that does not get better after you take pain medicine. You cannot pass stools or gas. You have bright red vaginal bleeding that soaks one or more pads in an hour, or you have large clots. You have a vaginal discharge that has increased or that smells bad. You are sick to your stomach or cannot drink fluids. You have symptoms of a blood clot in your leg (called a deep vein thrombosis), such as:  Pain in your calf, back of the knee, thigh, or groin. Redness and swelling in your leg. You have signs of infection, such as: Increased pain, swelling, warmth, or redness. A fever. Watch closely for changes in your health, and be sure to contact your doctor if you have any problems. Current as of: February 23, 2022               Content Version: 13.4  © 2006-2022 [employer]. Care instructions adapted under license by [facility] ([facility]).  If you have questions about a medical condition or this instruction, always ask your healthcare professional. [address] any warranty or liability for your use of this information. MEDICATION INTERACTION:  During your procedure you potentially received a medication or medications which may reduce the effectiveness of oral contraceptives. Please consider other forms of contraception for 1 month following your procedure if you are currently using oral contraceptives as your primary form of birth control. In addition to this, we recommend continuing your oral contraceptive as prescribed, unless otherwise instructed by your physician, during this time    After general anesthesia or intravenous sedation, for 24 hours or while taking prescription Narcotics:  Limit your activities  A responsible adult needs to be with you for the next 24 hours  Do not drive and operate hazardous machinery  Do not make important personal or business decisions  Do not drink alcoholic beverages  If you have not urinated within 8 hours after discharge, and you are experiencing discomfort from urinary retention, please go to the nearest ED. If you have sleep apnea and have a CPAP machine, please use it for all naps and sleeping. Please use caution when taking narcotics and any of your home medications that may cause drowsiness. *  Please give a list of your current medications to your Primary Care Provider. *  Please update this list whenever your medications are discontinued, doses are      changed, or new medications (including over-the-counter products) are added. *  Please carry medication information at all times in case of emergency situations. These are general instructions for a healthy lifestyle:  No smoking/ No tobacco products/ Avoid exposure to second hand smoke  Surgeon General's Warning:  Quitting smoking now greatly reduces serious risk to your health.   Obesity, smoking, and sedentary lifestyle greatly increases your risk for illness  A healthy diet, regular physical exercise & weight monitoring are important for maintaining a healthy lifestyle    You may be retaining fluid if you have a history of heart failure or if you experience any of the following symptoms:  Weight gain of 3 pounds or more overnight or 5 pounds in a week, increased swelling in our hands or feet or shortness of breath while lying flat in bed. Please call your doctor as soon as you notice any of these symptoms; do not wait until your next office visit.

## 2022-10-04 NOTE — ANESTHESIA PRE PROCEDURE
Department of Anesthesiology  Preprocedure Note       Name:  Shukri Carpio   Age:  44 y.o.  :  1985                                          MRN:  [id number]         Date:  10/4/2022      Surgeon: Halley Kim):  Kedar Harrington MD    Procedure: Procedure(s):  DILATATION AND CURETTAGE HYSTEROSCOPY    Medications prior to admission:   Prior to Admission medications    Medication Sig Start Date End Date Taking?  Authorizing Provider   ferrous gluconate (FERGON) 324 (38 Fe) MG tablet Take 324 mg by mouth at bedtime   Yes Historical Provider, MD   megestrol (MEGACE) 40 MG tablet Take 2 tablets by mouth 2 times daily 22   Kedar Harrington MD   ibuprofen (ADVIL;MOTRIN) 600 MG tablet Take 1 tablet by mouth 4 times daily as needed for Pain 6/15/22   Reanna Peters MD       Current medications:    Current Facility-Administered Medications   Medication Dose Route Frequency Provider Last Rate Last Admin    lidocaine 1 % injection 1 mL  1 mL IntraDERmal Once PRN Manfred Huynh DO        fentaNYL (SUBLIMAZE) injection 100 mcg  100 mcg IntraVENous Once PRN Manfred Huynh DO        lactated ringers infusion  100 mL/hr IntraVENous Continuous Manfred Chicas DO        midazolam PF (VERSED) injection 2 mg  2 mg IntraVENous Once PRN Manfred Huynh DO        sodium chloride flush 0.9 % injection 5-40 mL  5-40 mL IntraVENous 2 times per day Kedar Harrington MD        sodium chloride flush 0.9 % injection 5-40 mL  5-40 mL IntraVENous PRN Kedar Harrington MD        0.9 % sodium chloride infusion   IntraVENous PRN Kedar Harrington MD        ceFAZolin (ANCEF) 2000 mg in sterile water 20 mL IV syringe  2,000 mg IntraVENous On Call to [facility], MD           Allergies:  No Known Allergies    Problem List:    Patient Active Problem List   Diagnosis Code    Menorrhagia with regular cycle N92.0    Anemia due to chronic blood loss D50.0    Syncope and collapse R55    Class 2 severe obesity due to excess calories with serious comorbidity and body mass index (BMI) of 39.0 to 39.9 in adult ([facility]) E66.01, Z68.39    Endometrial hyperplasia with atypia N85.02       Past Medical History:        Diagnosis Date    Endometrial hyperplasia with atypia     Fibroid     Iron deficiency anemia     last transfusion 8/2022    Menorrhagia 2016    Thickened endometrium        Past Surgical History:        Procedure Laterality Date    APPENDECTOMY      CHOLECYSTECTOMY      DILATION AND CURETTAGE OF UTERUS  03/04/2016    By Dr. Cristofer Bahena N/A 06/15/2022    DILATATION AND CURETTAGE HYSTEROSCOPY performed by Chanel Block MD at [facility]       Social History:    Social History     Tobacco Use    Smoking status: Never    Smokeless tobacco: Never   Substance Use Topics    Alcohol use: Not Currently                                Counseling given: Not Answered      Vital Signs (Current):   Vitals:    09/30/22 0957 10/04/22 0708   BP:  (!) 174/100   Pulse:  68   Resp:  18   Temp:  97.7 °F (36.5 °C)   TempSrc:  Oral   SpO2:  98%   Weight: 220 lb (99.8 kg) 220 lb (99.8 kg)   Height: 5' 3\" (1.6 m)                                               BP Readings from Last 3 Encounters:   10/04/22 (!) 174/100   08/20/22 (!) 146/92   08/13/22 (!) 152/94       NPO Status:                                                                                 BMI:   Wt Readings from Last 3 Encounters:   10/04/22 220 lb (99.8 kg)   08/06/22 231 lb 12.8 oz (105.1 kg)   07/13/22 230 lb (104.3 kg)     Body mass index is 38.97 kg/m².     CBC:   Lab Results   Component Value Date/Time    WBC 6.1 07/13/2022 02:01 PM    RBC 3.97 07/13/2022 02:01 PM    HGB 10.2 07/13/2022 02:01 PM    HCT 33.4 07/13/2022 02:01 PM    MCV 84.1 07/13/2022 02:01 PM    RDW 21.4 07/13/2022 02:01 PM     07/13/2022 02:01 PM       CMP:   Lab Results   Component Value Date/Time     07/07/2022 07:42 AM    K 4.0 07/07/2022 07:42 AM     07/07/2022 07:42 AM    CO2 27 07/07/2022 07:42 AM    BUN 14 07/07/2022 07:42 AM    CREATININE 1.00 07/07/2022 07:42 AM    GFRAA >60 07/07/2022 07:42 AM    LABGLOM >60 07/07/2022 07:42 AM    GLUCOSE 96 07/07/2022 07:42 AM    PROT 6.5 07/07/2022 07:42 AM    CALCIUM 8.6 07/07/2022 07:42 AM    BILITOT 1.4 07/07/2022 07:42 AM    ALKPHOS 87 07/07/2022 07:42 AM    AST 9 07/07/2022 07:42 AM    ALT 12 07/07/2022 07:42 AM       POC Tests: No results for input(s): POCGLU, POCNA, POCK, POCCL, POCBUN, POCHEMO, POCHCT in the last 72 hours. Coags: No results found for: PROTIME, INR, APTT    HCG (If Applicable):   Lab Results   Component Value Date    PREGTESTUR Negative 10/04/2022        ABGs: No results found for: PHART, PO2ART, FMY8ZYZ, JJJ3VHT, BEART, A6OZGTGR     Type & Screen (If Applicable):  No results found for: LABABO, LABRH    Drug/Infectious Status (If Applicable):  No results found for: HIV, HEPCAB    COVID-19 Screening (If Applicable): No results found for: COVID19        Anesthesia Evaluation  Patient summary reviewed  Airway: Mallampati: II          Dental: normal exam         Pulmonary:Negative Pulmonary ROS and normal exam  breath sounds clear to auscultation                             Cardiovascular:Negative CV ROS  Exercise tolerance: good (>4 METS),           Rhythm: regular  Rate: normal                    Neuro/Psych:   Negative Neuro/Psych ROS              GI/Hepatic/Renal: Neg GI/Hepatic/Renal ROS            Endo/Other: Negative Endo/Other ROS                    Abdominal:   (+) obese,           Vascular: negative vascular ROS. Other Findings: 43 yo F with sig Uterine fibroids for hysteroscopy D and C. Has had similar procedure 7/2022, required 2 units PRBCs for anemia          Anesthesia Plan      general     ASA 2     (Check Hgb in preop, okay to transfuse if necessary per pt)  Induction: intravenous.     MIPS: Postoperative opioids intended and Prophylactic antiemetics administered. Anesthetic plan and risks discussed with patient. Use of blood products discussed with patient whom. Plan discussed with CRNA.                     Gera Rowley, DO   10/4/2022

## 2022-10-04 NOTE — ANESTHESIA PROCEDURE NOTES
Airway  Date/Time: 10/4/2022 8:01 AM  Urgency: elective    Airway not difficult    General Information and Staff    Patient location during procedure: OR  Resident/CRNA: SANDOR Craig - CRNA  Performed: resident/CRNA     Indications and Patient Condition  Indications for airway management: anesthesia  Spontaneous Ventilation: absent  Sedation level: deep  Preoxygenated: yes  Patient position: sniffing  Mask difficulty assessment: vent by bag mask    Final Airway Details  Final airway type: supraglottic airway      Successful airway: oropharyngeal  Size 4     Number of attempts at approach: 1  Ventilation between attempts: supraglottic airway  Number of other approaches attempted: 0

## 2022-10-04 NOTE — BRIEF OP NOTE
Brief Postoperative Note      Patient: Manasa Cardoza  YOB: 1985  MRN: [id number]    Date of Procedure: 10/4/2022    Pre-Op Diagnosis: Endometrial hyperplasia with atypia [N85.02]    Post-Op Diagnosis: Same       Procedure(s):  DILATATION AND CURETTAGE HYSTEROSCOPY    Surgeon(s):  Gustavo Sanchez MD    Assistant:  * No surgical staff found *    Anesthesia: General    Estimated Blood Loss (mL): less than 50     Complications: None    Specimens:   ID Type Source Tests Collected by Time Destination   A : ENDOMETRIAL CURRETTING  Tissue Uterus SURGICAL PATHOLOGY Gustavo Sanchez MD 10/4/2022 8918        Implants:  * No implants in log *      Drains: * No LDAs found *    Findings: very large uterus with enlarged cavity, no obvious intrauterine neoplasia    Electronically signed by Gustavo Sanchez MD on 10/4/2022 at 8:22 AM

## 2022-10-05 NOTE — OP NOTE
[facility]  OPERATIVE REPORT    Name:  Jazmín Oquendo  MR#:  [id number]  :  1985  ACCOUNT #:  [de-identified]  DATE OF SERVICE:  10/04/2022    PREOPERATIVE DIAGNOSES:  1.  Endometrial hyperplasia. 2.  Enlarged uterus. POSTOPERATIVE DIAGNOSES:  1.  Endometrial hyperplasia. 2.  Enlarged uterus. PROCEDURE PERFORMED:  D and C with hysteroscopy. SURGEON:  Juanda Fothergill, MD        ANESTHESIA:  General.        ESTIMATED BLOOD LOSS:  Less than 50 mL    PACKS:  None. DRAINS:  None. PATHOLOGY:  Above. DISPOSITION:  PACU. INDICATION:  This is a patient who had been initially seen for severe anemia along with hyperplasia. She wished to try to preserve fertility. She also had a very large uterus consistent with fibroids. She underwent several months of progestin therapy and was for return biopsy. The risks, benefits and alternatives, morbidities and mortalities were reviewed prior to procedure. FINDINGS:  Cervix was at the introitus without Valsalva consistent with pelvic prolapse. Her uterus was approximately 20 cm in size consistent with her prior imaging with a very large cavity. Hysteroscopically there was no obvious neoplasia. There was no significant active bleeding at completion. PROCEDURE:  The patient was taken to the operating room placed in general anesthesia, sterilely prepped and draped. Cervix was visualized at the introitus. It was grasped anteriorly, gently dilated without difficulty. Hysteroscopy with normal saline undertaken with findings as above. Sharp curettage done with no obvious problems or issues. Sponge, lap, and needle counts were correct. The patient tolerated the procedure well and was taken to PACU stable per Anesthesia.         MD ROSANNE [id number]  D:  10/04/2022 8:28  T:  10/04/2022 23:28  JOB #:  [id number]

## 2022-10-31 ENCOUNTER — TELEPHONE (OUTPATIENT)
Dept: ONCOLOGY | Age: 37
End: 2022-10-31

## 2022-10-31 NOTE — TELEPHONE ENCOUNTER
Patient called to request a refill of:    megestrol (MEGACE) 40 MG tablet    Please send to:    [address], [address] [place] [address] [phone] Yessy Hilliard [phone]    Call patient when complete.

## 2022-11-08 NOTE — PROGRESS NOTES
Date: 11/9/2022        Patient Name: Rosario Rodriguez     YOB: 1985          Chief Complaint     Chief Complaint   Patient presents with    Follow-up    endometrial hyperplasia, atypical>>cleared with megace, d and c omar, oct 2022   D and c wnl, after 3 months megace, oct 2022   Pt reports episode in past  anemia, severe, noted on initial consult visit, transfused, July 2022   lupron 4 week dose, inpt., July 2022   Megace 80 bid (change from provera, July 2022)    Large fibroids          History of Present Illness   Rosario Rodriguez is a 44 y.o. who presents today for scheduled visit    Noted long hx abnormal bleeding, and about 2 years ago reports episode hyperplasia requireing tx. Increasing bleding over at least six moths, eventually d and c with gyn, complex hyperplasia  Also at that time recived 2 u prbc    Reports continued bleeding since d and c, no knonwn h/h with gyn, taking provera 20 daily as directed, with increasing fatigue. summery  Obesity    Abnormal bleeding   Many months, at least since last fall   Pt reports has continued bleeding daily since d and c with gyn    Reported 2 untis prbc at procedure.     Wishes to preserve fertility    Has used some asa during this recent bleeding episode    Past Medical History     Past Medical History:   Diagnosis Date    Endometrial hyperplasia with atypia     Fibroid     Iron deficiency anemia     last transfusion 8/2022    Menorrhagia 2016    Thickened endometrium         Past Surgical History     Past Surgical History:   Procedure Laterality Date    APPENDECTOMY      CHOLECYSTECTOMY      DILATION AND CURETTAGE OF UTERUS  03/04/2016    By Dr. Thierry Doss N/A 06/15/2022    DILATATION AND CURETTAGE HYSTEROSCOPY performed by Delmi Thompson MD at [place] [address] N/A 10/4/2022    DILATATION AND CURETTAGE HYSTEROSCOPY performed by Juanda Fothergill, MD at [address] [place] [address] Medications      Current Outpatient Medications:     ondansetron (ZOFRAN) 4 MG tablet, Take 1 tablet by mouth 3 times daily as needed for Nausea or Vomiting, Disp: 15 tablet, Rfl: 0    prochlorperazine (COMPAZINE) 10 MG tablet, Take 1 tablet by mouth every 6 hours as needed (nausea), Disp: 120 tablet, Rfl: 3    ferrous gluconate (FERGON) 324 (38 Fe) MG tablet, Take 324 mg by mouth at bedtime, Disp: , Rfl:     ibuprofen (ADVIL;MOTRIN) 600 MG tablet, Take 1 tablet by mouth 4 times daily as needed for Pain, Disp: 40 tablet, Rfl: 0    megestrol (MEGACE) 40 MG tablet, Take 2 tablets by mouth 2 times daily (Patient not taking: Reported on 11/9/2022), Disp: 120 tablet, Rfl: 3     Allergies   Patient has no known allergies. Social History     Social History       Tobacco History       Smoking Status  Never Smoker      Smokeless Tobacco Use  Never Used              Alcohol History       Alcohol Use Status  Not Currently              Drug Use       Drug Use Status  Never              Sexual Activity       Sexually Active  Yes Partners  Male                    Family History     Family History   Problem Relation Age of Onset    Endometriosis Sister        Review of Systems   Review of Systems    Physical Exam     ECOG PERFORMANCE STATUS -0    Blood pressure (!) 142/82, pulse 90, temperature 98.8 °F (37.1 °C), temperature source Oral, resp. rate 16, height 5' 3\" (1.6 m), weight 240 lb 8 oz (109.1 kg), SpO2 100 %. Physical Exam    Labs        No results found for this or any previous visit (from the past 48 hour(s)). No results found for:      Pathology    Name:    Jenny Bell   Accession Number:   [id number]   MR #   [id number]   Date Obtained:   10/3/2022   DIAGNOSIS        A:  \" ENDOMETRIAL CURETTINGS\":         FRAGMENTS OF NON-SECRETORY ENDOMETRIUM           Sign Out Date: 10/5/2022  ZANDER Abbott M.D.        Accession Number:   [id number]   MR #   [id number]   Date Obtained:   6/15/2022 DIAGNOSIS        A:  \" ENDOMETRIAL CURETTINGS\":         ENDOMETRIUM HAVING ENDOMETRIAL HYPERPLASIA, COMPLEX WITH FOCAL   ATYPIA             Sign Out Date: 6/17/2022  ZANDER Moran M.D. Imaging/Diagnostics Last 24 Hours   No results found. Radiology:    CT Result (most recent):  No results found for this or any previous visit from the past 3650 days. PET Results (most recent):  No results found for this or any previous visit from the past 365 days. Mammo Results (most recent):  No results found for this or any previous visit from the past 365 days. US Result (most recent):  US PELVIS COMPLETE 07/06/2022    Narrative  Clinical history: Menorrhagia and anemia. D and C on 6/15/2022. Negative  pregnancy test.    TECHNIQUE: Transabdominal pelvic ultrasound. COMPARISON: None    FINDINGS:    The uterus measures 18.2 x 11.5 x 13.3 cm. There is large uterine fibroid at the  fundus, extending from the myometrium to the endometrium. This measures about  8.7 x 7.6 x 6.9 cm. There is a small 2.6 cm intramural fibroid. Nabothian cysts  are noted. Endometrial echo complex measures 2.7 cm. Both ovaries are unremarkable. Left ovary measures 4.6 x 3.6 x 2.2 cm and the  right measures 3.7 x 2.8 x 2.6 cm. There is color Doppler flow. There is no significant free fluid. Impression  1. Large fibroid at the uterine fundus, extending from the myometrium to the  endometrium. This measures up to 8.7 cm. Assessment       Diagnosis Orders   1. History of endometrial hyperplasia        2.  Fibroids          Specialty Problems       None            Plan   1pt  continues to wish attempt child bearing, issueas and risk recurrence reviewed>>pt sent back to dr douglas/ ob/gyn        Juanda Fothergill, MD  [facility]  [address] [place] [address]  Office : [phone]  Fax : [phone]

## 2022-11-09 ENCOUNTER — OFFICE VISIT (OUTPATIENT)
Dept: ONCOLOGY | Age: 37
End: 2022-11-09

## 2022-11-09 VITALS
HEIGHT: 63 IN | SYSTOLIC BLOOD PRESSURE: 142 MMHG | WEIGHT: 240.5 LBS | TEMPERATURE: 98.8 F | RESPIRATION RATE: 16 BRPM | HEART RATE: 90 BPM | OXYGEN SATURATION: 100 % | BODY MASS INDEX: 42.61 KG/M2 | DIASTOLIC BLOOD PRESSURE: 82 MMHG

## 2022-11-09 DIAGNOSIS — D21.9 FIBROIDS: ICD-10-CM

## 2022-11-09 DIAGNOSIS — Z87.42 HISTORY OF ENDOMETRIAL HYPERPLASIA: Primary | ICD-10-CM

## 2022-11-09 PROCEDURE — 99024 POSTOP FOLLOW-UP VISIT: CPT | Performed by: OBSTETRICS & GYNECOLOGY

## 2022-11-09 ASSESSMENT — PATIENT HEALTH QUESTIONNAIRE - PHQ9
SUM OF ALL RESPONSES TO PHQ QUESTIONS 1-9: 0
SUM OF ALL RESPONSES TO PHQ QUESTIONS 1-9: 0
1. LITTLE INTEREST OR PLEASURE IN DOING THINGS: 0
2. FEELING DOWN, DEPRESSED OR HOPELESS: 0
SUM OF ALL RESPONSES TO PHQ QUESTIONS 1-9: 0
SUM OF ALL RESPONSES TO PHQ QUESTIONS 1-9: 0
SUM OF ALL RESPONSES TO PHQ9 QUESTIONS 1 & 2: 0

## 2022-11-09 NOTE — PATIENT INSTRUCTIONS
Patient Instructions from Today's Visit    Reason for Visit:  POV    Diagnosis Information:  [email or web address]      Plan:  Return to Dr Merlyn Sandoval at this time    Follow Up:  As needed    Recent Lab Results:n/a      Treatment Summary has been discussed and given to patient: n/a        -------------------------------------------------------------------------------------------------------------------    Patient does express an interest in My Chart. My Chart log in information explained on the after visit summary printout at the [facility] [address] desk.     Boni Huertas, RN, MSN, OCN  Gynecology Nurse Navigator for Dr. Miguel Machado  [address] [place] [address]  Phone:  [phone]  Fax: [phone]  Email: [email or web address]

## 2022-12-14 ENCOUNTER — OFFICE VISIT (OUTPATIENT)
Dept: OBGYN CLINIC | Age: 37
End: 2022-12-14
Payer: COMMERCIAL

## 2022-12-14 VITALS — HEIGHT: 63 IN | BODY MASS INDEX: 42.21 KG/M2 | WEIGHT: 238.2 LBS

## 2022-12-14 DIAGNOSIS — N85.02 ENDOMETRIAL HYPERPLASIA WITH ATYPIA: Primary | ICD-10-CM

## 2022-12-14 PROCEDURE — 99214 OFFICE O/P EST MOD 30 MIN: CPT | Performed by: OBSTETRICS & GYNECOLOGY

## 2022-12-14 NOTE — PROGRESS NOTES
The patient presents to the office today having completed therapy for endometrial hyperplasia with Dr. Cheryle Malloy. She states that she has been released from his practice and ultimately returns to the office today for further evaluation and management to help facilitate pregnancy. She had a D&C performed by him in October which revealed clean endometrium. She has been released to resume minimal activities at his office and ultimately states that she only recently stopped bleeding after that D&C. She stopped the Megace approximately three weeks after her D&C when pathology revealed clean endometrium. She states that her lining remained very thin up until approximately two weeks ago when she finally stopped bleeding. She historically is someone who would have irregular periods and episodes of chronic anovulation. The patient is counseled that if she does not have a spontaneous cycle in the next four weeks, then we will initiate therapy with Provera and subsequently can attempt ovulation induction with Femara. She was counseled that I am willing to try this for approximately six months' time but at the age of 44, I am sensitive to the fact that her eggs are aging. I have offered to refer her immediately to reproductive endocrinology if she desires. She ultimately states that she would like to try to get pregnant spontaneously initially and then with our assistance with ovulation induction if at all possible prior to any referral.  She is encouraged to be very compulsive with her diet and exercise as she is significantly overweight at this point in time. She states she will. We will see her back in approximately four to five weeks to ascertain whether or not ovulation induction will be warranted.   Full 25 minutes spent face-to-face with her today listening her interval history, discussing with her, her goals and establishing ways into which we will help her accomplish things with the entire time spent this way.

## 2023-02-20 ENCOUNTER — OFFICE VISIT (OUTPATIENT)
Dept: OBGYN CLINIC | Age: 38
End: 2023-02-20
Payer: COMMERCIAL

## 2023-02-20 VITALS
HEIGHT: 63 IN | DIASTOLIC BLOOD PRESSURE: 80 MMHG | WEIGHT: 239.6 LBS | BODY MASS INDEX: 42.45 KG/M2 | SYSTOLIC BLOOD PRESSURE: 130 MMHG

## 2023-02-20 DIAGNOSIS — N92.6 IRREGULAR MENSTRUATION: Primary | ICD-10-CM

## 2023-02-20 PROCEDURE — 99214 OFFICE O/P EST MOD 30 MIN: CPT | Performed by: OBSTETRICS & GYNECOLOGY

## 2023-02-20 RX ORDER — LETROZOLE 2.5 MG/1
2.5 TABLET, FILM COATED ORAL 2 TIMES DAILY
Qty: 10 TABLET | Refills: 5 | Status: SHIPPED | OUTPATIENT
Start: 2023-02-20

## 2023-02-20 NOTE — PROGRESS NOTES
Reports periods happening every 3 to 3 1/2 weeks. Bleeds for 7 days , but reports flow is not heavy. She would like to discuss the next steps to try to get pregnant. 10/4/2022 DILATATION AND CURETTAGE HYSTEROSCOPY with Dr. hSikha Greene   - pathology   A:  \" ENDOMETRIAL CURETTINGS\":         FRAGMENTS OF NON-SECRETORY ENDOMETRIUM     The patient returns to the office today for initiation of therapy with Femara and an explanation of timed intercourse in an effort to try and allow her to become pregnant. She is a patient of mine who was seen and evaluated for endometrial hyperplasia. She was treated by Dr. Shikha Greene with Lori and ultimately had negative pathology in October. When we saw her in December, she had not resumed cycling after Megace. The plan was to allow her body to recover and cycle predictably if it would and that we would facilitate ovulation support at this time. She relates that her menstrual cycles over the last three months have become quite predictable lasting approximately five to seven days and coming every 21 to 25 days apart. She states that they are not heavy nor are they painful and she is doing well with this. With this in mind, I have suggested to her that we cycle her on Femara 2.5 mg twice daily orally on cycle days #3 through #7 and begin timed intercourse from cycle days #10, #12, #14, #16, and #18 so that we can ensure adequate coverage of an ovulation of yet. She took good notes and voiced understanding the instructions as described to her. We will follow this regimen for six months' time at which point in time if she is still not conceived, I would refer her to reproductive endocrinologist as indicated. She and her  are amenable to this plan. We will see her back on a p.r.n. basis or positive pregnancy test as indicated.

## 2024-05-06 NOTE — PROGRESS NOTES
stripe  As desires conception, will avoid ongoing hormonal contraception, but will send 1x rx for aygestin to stop currently bleeding episode  Will determine additional management needs pending labs/US  Pt happy with plan       Orders Placed This Encounter   Procedures    TSH     Standing Status:   Future     Number of Occurrences:   1     Standing Expiration Date:   5/7/2025    T4, Free     Standing Status:   Future     Number of Occurrences:   1     Standing Expiration Date:   5/7/2025    Vitamin D 25 Hydroxy     Standing Status:   Future     Number of Occurrences:   1     Standing Expiration Date:   5/7/2025    Hemoglobin A1C     Standing Status:   Future     Number of Occurrences:   1     Standing Expiration Date:   5/7/2025    CBC with Auto Differential     Standing Status:   Future     Number of Occurrences:   1     Standing Expiration Date:   5/7/2025    HCG, Quantitative, Pregnancy     Standing Status:   Future     Number of Occurrences:   1     Standing Expiration Date:   5/7/2025         Supervising physician is Dr. Helms.  Greater than 50% of the 20 minute visit were spent in counseling to the above topics.

## 2024-05-07 ENCOUNTER — OFFICE VISIT (OUTPATIENT)
Dept: OBGYN CLINIC | Age: 39
End: 2024-05-07
Payer: COMMERCIAL

## 2024-05-07 VITALS
DIASTOLIC BLOOD PRESSURE: 82 MMHG | HEIGHT: 63 IN | WEIGHT: 239.5 LBS | BODY MASS INDEX: 42.44 KG/M2 | SYSTOLIC BLOOD PRESSURE: 142 MMHG

## 2024-05-07 DIAGNOSIS — N92.6 IRREGULAR MENSES: Primary | ICD-10-CM

## 2024-05-07 LAB
25(OH)D3 SERPL-MCNC: 13.4 NG/ML (ref 30–100)
BASOPHILS # BLD: 0 K/UL (ref 0–0.2)
BASOPHILS NFR BLD: 1 % (ref 0–2)
DIFFERENTIAL METHOD BLD: ABNORMAL
EOSINOPHIL # BLD: 0.1 K/UL (ref 0–0.8)
EOSINOPHIL NFR BLD: 2 % (ref 0.5–7.8)
ERYTHROCYTE [DISTWIDTH] IN BLOOD BY AUTOMATED COUNT: 20.1 % (ref 11.9–14.6)
EST. AVERAGE GLUCOSE BLD GHB EST-MCNC: 132 MG/DL
HBA1C MFR BLD: 6.2 % (ref 0–5.6)
HCG SERPL-ACNC: <1 MIU/ML
HCT VFR BLD AUTO: 24.9 % (ref 35.8–46.3)
HGB BLD-MCNC: 6.6 G/DL (ref 11.7–15.4)
IMM GRANULOCYTES # BLD AUTO: 0.1 K/UL (ref 0–0.5)
IMM GRANULOCYTES NFR BLD AUTO: 1 % (ref 0–5)
LYMPHOCYTES # BLD: 1.2 K/UL (ref 0.5–4.6)
LYMPHOCYTES NFR BLD: 18 % (ref 13–44)
MCH RBC QN AUTO: 17.8 PG (ref 26.1–32.9)
MCHC RBC AUTO-ENTMCNC: 26.5 G/DL (ref 31.4–35)
MONOCYTES # BLD: 0.3 K/UL (ref 0.1–1.3)
MONOCYTES NFR BLD: 4 % (ref 4–12)
NEUTS SEG # BLD: 5.1 K/UL (ref 1.7–8.2)
NEUTS SEG NFR BLD: 75 % (ref 43–78)
NRBC # BLD: 0.09 K/UL (ref 0–0.2)
PLATELET # BLD AUTO: 431 K/UL (ref 150–450)
PMV BLD AUTO: 11 FL (ref 9.4–12.3)
RBC # BLD AUTO: 3.71 M/UL (ref 4.05–5.2)
T4 FREE SERPL-MCNC: 1.1 NG/DL (ref 0.9–1.7)
TSH, 3RD GENERATION: 1.72 UIU/ML (ref 0.27–4.2)
WBC # BLD AUTO: 6.8 K/UL (ref 4.3–11.1)

## 2024-05-07 PROCEDURE — 99213 OFFICE O/P EST LOW 20 MIN: CPT | Performed by: NURSE PRACTITIONER

## 2024-05-08 ENCOUNTER — TELEPHONE (OUTPATIENT)
Dept: OBGYN CLINIC | Age: 39
End: 2024-05-08

## 2024-05-08 DIAGNOSIS — D50.8 OTHER IRON DEFICIENCY ANEMIA: ICD-10-CM

## 2024-05-08 DIAGNOSIS — N92.6 IRREGULAR MENSES: Primary | ICD-10-CM

## 2024-05-08 RX ORDER — ERGOCALCIFEROL 1.25 MG/1
50000 CAPSULE ORAL WEEKLY
Qty: 20 CAPSULE | Refills: 0 | Status: SHIPPED | OUTPATIENT
Start: 2024-05-08

## 2024-05-08 NOTE — TELEPHONE ENCOUNTER
----- Message from SANDOR Vicente - CNP sent at 5/8/2024  9:50 AM EDT -----  Significant anemia- can we place referral for HemOnc and set up for 2 units PRBCs until able to be seen by hematology- recheck CBC after 2 units

## 2024-05-08 NOTE — TELEPHONE ENCOUNTER
Order form faxed to [facility] for patient to get 2 units of PRBS.  Referral to Hematology placed.  Spoke with patient.  Notified her of low Hgb and need for referral to Hematology and Blood transfusion.  All questions answered. Pt v/u.  Pt to call back if any questions in the meantime.    Orders Placed This Encounter    [facility] Hematology & Oncology     Referral Priority:   Urgent     Referral Type:   Eval and Treat     Referral Reason:   Specialty Services Required     Requested Specialty:   Hematology and Oncology     Number of Visits Requested:   1

## 2024-05-09 ENCOUNTER — CLINICAL DOCUMENTATION (OUTPATIENT)
Dept: ONCOLOGY | Age: 39
End: 2024-05-09

## 2024-05-09 ENCOUNTER — OFFICE VISIT (OUTPATIENT)
Dept: ONCOLOGY | Age: 39
End: 2024-05-09
Payer: COMMERCIAL

## 2024-05-09 ENCOUNTER — HOSPITAL ENCOUNTER (OUTPATIENT)
Dept: LAB | Age: 39
Discharge: HOME | End: 2024-05-09
Payer: COMMERCIAL

## 2024-05-09 VITALS
BODY MASS INDEX: 42.01 KG/M2 | TEMPERATURE: 98.3 F | OXYGEN SATURATION: 100 % | WEIGHT: 237.1 LBS | DIASTOLIC BLOOD PRESSURE: 77 MMHG | RESPIRATION RATE: 20 BRPM | SYSTOLIC BLOOD PRESSURE: 140 MMHG | HEIGHT: 63 IN | HEART RATE: 81 BPM

## 2024-05-09 DIAGNOSIS — D64.9 SEVERE ANEMIA: ICD-10-CM

## 2024-05-09 DIAGNOSIS — D64.9 SEVERE ANEMIA: Primary | ICD-10-CM

## 2024-05-09 DIAGNOSIS — D50.0 IRON DEFICIENCY ANEMIA DUE TO CHRONIC BLOOD LOSS: Primary | ICD-10-CM

## 2024-05-09 DIAGNOSIS — N92.4 EXCESSIVE BLEEDING IN PREMENOPAUSAL PERIOD: ICD-10-CM

## 2024-05-09 LAB
ALBUMIN SERPL-MCNC: 3.7 G/DL (ref 3.5–5)
ALBUMIN/GLOB SERPL: 0.9 (ref 1–1.9)
ALP SERPL-CCNC: 96 U/L (ref 35–104)
ALT SERPL-CCNC: 10 U/L (ref 12–65)
ANION GAP SERPL CALC-SCNC: 11 MMOL/L (ref 9–18)
AST SERPL-CCNC: 16 U/L (ref 15–37)
BASOPHILS # BLD: 0.1 K/UL (ref 0–0.2)
BASOPHILS NFR BLD: 1 % (ref 0–2)
BILIRUB SERPL-MCNC: 0.4 MG/DL (ref 0–1.2)
BUN SERPL-MCNC: 10 MG/DL (ref 6–23)
CALCIUM SERPL-MCNC: 8.7 MG/DL (ref 8.8–10.2)
CHLORIDE SERPL-SCNC: 101 MMOL/L (ref 98–107)
CO2 SERPL-SCNC: 27 MMOL/L (ref 20–28)
CREAT SERPL-MCNC: 0.85 MG/DL (ref 0.6–1.1)
DAT POLY-SP REAG RBC QL: NORMAL
DIFFERENTIAL METHOD BLD: ABNORMAL
EOSINOPHIL # BLD: 0.1 K/UL (ref 0–0.8)
EOSINOPHIL NFR BLD: 2 % (ref 0.5–7.8)
ERYTHROCYTE [DISTWIDTH] IN BLOOD BY AUTOMATED COUNT: 20.2 % (ref 11.9–14.6)
FERRITIN SERPL-MCNC: 4 NG/ML (ref 8–388)
FOLATE SERPL-MCNC: 14.1 NG/ML (ref 3.1–17.5)
GLOBULIN SER CALC-MCNC: 4.1 G/DL (ref 2.3–3.5)
GLUCOSE SERPL-MCNC: 111 MG/DL (ref 70–99)
HAPTOGLOB SERPL-MCNC: 195 MG/DL (ref 30–200)
HCT VFR BLD AUTO: 25 % (ref 35.8–46.3)
HGB BLD-MCNC: 6.5 G/DL (ref 11.7–15.4)
HGB RETIC QN AUTO: 13 PG (ref 29–35)
IMM GRANULOCYTES # BLD AUTO: 0.1 K/UL (ref 0–0.5)
IMM GRANULOCYTES NFR BLD AUTO: 1 % (ref 0–5)
IMM RETICS NFR: 29 % (ref 3–15.9)
IRON SATN MFR SERPL: 3 % (ref 20–50)
IRON SERPL-MCNC: 12 UG/DL (ref 35–100)
LDH SERPL L TO P-CCNC: 169 U/L (ref 127–281)
LYMPHOCYTES # BLD: 1.6 K/UL (ref 0.5–4.6)
LYMPHOCYTES NFR BLD: 23 % (ref 13–44)
MCH RBC QN AUTO: 17.5 PG (ref 26.1–32.9)
MCHC RBC AUTO-ENTMCNC: 26 G/DL (ref 31.4–35)
MCV RBC AUTO: 67.2 FL (ref 82–102)
MONOCYTES # BLD: 0.4 K/UL (ref 0.1–1.3)
MONOCYTES NFR BLD: 5 % (ref 4–12)
NEUTS SEG # BLD: 4.8 K/UL (ref 1.7–8.2)
NEUTS SEG NFR BLD: 68 % (ref 43–78)
NRBC # BLD: 0.11 K/UL (ref 0–0.2)
PLATELET # BLD AUTO: 442 K/UL (ref 150–450)
PLATELET COMMENT: SLIGHT
PMV BLD AUTO: 10.3 FL (ref 9.4–12.3)
POTASSIUM SERPL-SCNC: 3.7 MMOL/L (ref 3.5–5.1)
PROT SERPL-MCNC: 7.8 G/DL (ref 6.3–8.2)
RBC # BLD AUTO: 3.72 M/UL (ref 4.05–5.2)
RBC MORPH BLD: ABNORMAL
RETICS # AUTO: 0.08 M/UL (ref 0.03–0.1)
RETICS/RBC NFR AUTO: 2.1 % (ref 0.3–2)
SODIUM SERPL-SCNC: 139 MMOL/L (ref 136–145)
TIBC SERPL-MCNC: 367 UG/DL (ref 240–450)
UIBC SERPL-MCNC: 355 UG/DL (ref 112–347)
VIT B12 SERPL-MCNC: 476 PG/ML (ref 193–986)
WBC # BLD AUTO: 7.1 K/UL (ref 4.3–11.1)
WBC MORPH BLD: ABNORMAL

## 2024-05-09 PROCEDURE — 85025 COMPLETE CBC W/AUTO DIFF WBC: CPT

## 2024-05-09 PROCEDURE — 82607 VITAMIN B-12: CPT

## 2024-05-09 PROCEDURE — 80053 COMPREHEN METABOLIC PANEL: CPT

## 2024-05-09 PROCEDURE — 36415 COLL VENOUS BLD VENIPUNCTURE: CPT

## 2024-05-09 PROCEDURE — 83615 LACTATE (LD) (LDH) ENZYME: CPT

## 2024-05-09 PROCEDURE — 83550 IRON BINDING TEST: CPT

## 2024-05-09 PROCEDURE — 99204 OFFICE O/P NEW MOD 45 MIN: CPT | Performed by: INTERNAL MEDICINE

## 2024-05-09 PROCEDURE — 82728 ASSAY OF FERRITIN: CPT

## 2024-05-09 PROCEDURE — 82746 ASSAY OF FOLIC ACID SERUM: CPT

## 2024-05-09 PROCEDURE — 86880 COOMBS TEST DIRECT: CPT

## 2024-05-09 PROCEDURE — 86850 RBC ANTIBODY SCREEN: CPT

## 2024-05-09 PROCEDURE — 86900 BLOOD TYPING SEROLOGIC ABO: CPT

## 2024-05-09 PROCEDURE — 86923 COMPATIBILITY TEST ELECTRIC: CPT

## 2024-05-09 PROCEDURE — 83010 ASSAY OF HAPTOGLOBIN QUANT: CPT

## 2024-05-09 PROCEDURE — 85046 RETICYTE/HGB CONCENTRATE: CPT

## 2024-05-09 PROCEDURE — 86901 BLOOD TYPING SEROLOGIC RH(D): CPT

## 2024-05-09 PROCEDURE — 83540 ASSAY OF IRON: CPT

## 2024-05-09 RX ORDER — SODIUM CHLORIDE 0.9 % (FLUSH) 0.9 %
5-40 SYRINGE (ML) INJECTION PRN
OUTPATIENT
Start: 2024-05-16

## 2024-05-09 RX ORDER — SODIUM CHLORIDE 9 MG/ML
5-250 INJECTION, SOLUTION INTRAVENOUS PRN
OUTPATIENT
Start: 2024-05-16

## 2024-05-09 RX ORDER — SODIUM CHLORIDE 9 MG/ML
INJECTION, SOLUTION INTRAVENOUS CONTINUOUS
OUTPATIENT
Start: 2024-05-16

## 2024-05-09 RX ORDER — ACETAMINOPHEN 325 MG/1
650 TABLET ORAL ONCE
OUTPATIENT
Start: 2024-05-16 | End: 2024-05-16

## 2024-05-09 RX ORDER — ONDANSETRON 2 MG/ML
8 INJECTION INTRAMUSCULAR; INTRAVENOUS
OUTPATIENT
Start: 2024-05-16

## 2024-05-09 RX ORDER — EPINEPHRINE 1 MG/ML
0.3 INJECTION, SOLUTION, CONCENTRATE INTRAVENOUS PRN
OUTPATIENT
Start: 2024-05-16

## 2024-05-09 RX ORDER — ALBUTEROL SULFATE 90 UG/1
4 AEROSOL, METERED RESPIRATORY (INHALATION) PRN
OUTPATIENT
Start: 2024-05-16

## 2024-05-09 RX ORDER — DIPHENHYDRAMINE HYDROCHLORIDE 50 MG/ML
50 INJECTION INTRAMUSCULAR; INTRAVENOUS
OUTPATIENT
Start: 2024-05-16

## 2024-05-09 RX ORDER — HEPARIN 100 UNIT/ML
500 SYRINGE INTRAVENOUS PRN
OUTPATIENT
Start: 2024-05-16

## 2024-05-09 RX ORDER — DEXAMETHASONE SODIUM PHOSPHATE 10 MG/ML
10 INJECTION, SOLUTION INTRAMUSCULAR; INTRAVENOUS ONCE
OUTPATIENT
Start: 2024-05-16 | End: 2024-05-16

## 2024-05-09 RX ORDER — ACETAMINOPHEN 325 MG/1
650 TABLET ORAL
OUTPATIENT
Start: 2024-05-16

## 2024-05-09 RX ORDER — MEPERIDINE HYDROCHLORIDE 25 MG/ML
12.5 INJECTION INTRAMUSCULAR; INTRAVENOUS; SUBCUTANEOUS PRN
OUTPATIENT
Start: 2024-05-16

## 2024-05-09 ASSESSMENT — PATIENT HEALTH QUESTIONNAIRE - PHQ9
SUM OF ALL RESPONSES TO PHQ QUESTIONS 1-9: 0
1. LITTLE INTEREST OR PLEASURE IN DOING THINGS: NOT AT ALL
SUM OF ALL RESPONSES TO PHQ QUESTIONS 1-9: 0
SUM OF ALL RESPONSES TO PHQ QUESTIONS 1-9: 0
SUM OF ALL RESPONSES TO PHQ9 QUESTIONS 1 & 2: 0
2. FEELING DOWN, DEPRESSED OR HOPELESS: NOT AT ALL
SUM OF ALL RESPONSES TO PHQ QUESTIONS 1-9: 0

## 2024-05-09 NOTE — PROGRESS NOTES
NEW PATIENT INTAKE    Referral Diagnosis: Other iron deficiency anemia    Referring Provider: Meagan Turner APRN - CNP    Primary Care Provider: None    Family History of Cancer/ Hematology Disorders: None reported     Presenting Symptoms: Menorrhagia, nausea, weakness     Chronological History of Pertinent Events: Ms. Foss is a 39-year-old black female referred for CAROLYNE. She is a former pt of Dr. Delgado, with history of endometrial hyperplasia, fibroids, CAROLYNE requiring blood transfusions and iron infusions, and hysteroscopy with dilatation & curettage, last seen on 11/9/22.     6/15/22: Hysteroscopy with dilatation & curettage with GYN     7/6/22 - 7/7/22: Admitted at [facility] with CAROLYNE due to chronic blood loss 2/2 menorrhagia. She had experienced uncontrolled vaginal bleeding since D&C on 6/15/22. HGB was 4.2 on arrival to the ED and dropped to a low of 4.0. Pt received a total of 6 units of PRBCs. She was also treated with 250 mg IV Ferrlecit. GYN was consulted and pt was started on Provera and Megace.     7/23/22 - 8/20/22: Pt received 6 doses of IV Ferrlecit    10/4/22: HGB 11.0/HCT 37.0 (Epic/Labs)    10/5/22: D and C with hysteroscopy by Dr. Delgado; benign pathology     5/7/24: Presented to GYN for irregular vaginal bleeding. Pt reports heavy vaginal bleeding every day since 1/5/24 with associated nausea and weakness.   -Provider noted, “Given BMI suspect anovulatory Will bring back for TVUS to further eval stripe As desires conception, will avoid ongoing hormonal contraception, but will send 1x rx for aygestin to stop currently bleeding episode”  -CBC significant for RBC 3.71, HGB 6.6, HCT 24.9, MCV 67.1, MCH 17.8, MCHC 26.5, RDW 20.1  -TSH and Free T4 WNL (Epic/Labs)   -Per med rec pt is prescribed 324 mg ferrous gluconate PO daily    5/8/24: Urgent referral to [facility]  -2 units of PRBCs ordered; pt scheduled for transfusion with 2 units of PRBCs on 5/10/24  -Scheduled for TVUS and GYN f/u on 5/10/24    Notes

## 2024-05-09 NOTE — PATIENT INSTRUCTIONS
Patient Information from Today's Visit    The members of your [facility]cology [facility] are listed below:    Physician Provider: Candie Ramirez Medical Oncologist  Advanced Practice Clinician: Nikki Bee NP  Registered Nurse: Not Assigned  Nurse Navigator: N/A  Medical Assistant: Hawa BARRIENTOS MA  :Lulú HARRINGTON  Supportive Care Services: Baltazar HART LMSW    Follow Up Instructions: Continue with Blood Transfusion tomorrow. We will arrange an IV Iron infusion in the next couple weeks. After the IV iron, you do not need to follow up with our office as long as the bleeding is taken care of by your OBGYN, let us know if you need anything.      Treatment Summary has been discussed and given to patient:N/A      Current Labs:   Hospital Outpatient Visit on 05/09/2024   Component Date Value Ref Range Status    Reticulocyte Count,Automated 05/09/2024 2.1 (H)  0.3 - 2.0 % Final    Absolute Retic # 05/09/2024 [id number]  0.026 - 0.095 M/ul Final    Immature Retic Fraction 05/09/2024 29.0 (H)  3.0 - 15.9 % Final    Retic Hemoglobin conc. 05/09/2024 13 (L)  29 - 35 pg Final    WBC 05/09/2024 7.1  4.3 - 11.1 K/uL Final    PERIPHERAL REVIEW TO FOLLOW    RBC 05/09/2024 3.72 (L)  4.05 - 5.2 M/uL Final    Hemoglobin 05/09/2024 6.5 (LL)  11.7 - 15.4 g/dL Final    Comment: RESULTS VERIFIED, PHONED TO AND READ BACK BY  MIRIAM PLASENCIA RN @ 4558 ON 05/09/2024 BY AMANUEL       Hematocrit 05/09/2024 25.0 (L)  35.8 - 46.3 % Final    MCV 05/09/2024 67.2 (L)  82.0 - 102.0 FL Final    MCH 05/09/2024 17.5 (L)  26.1 - 32.9 PG Final    MCHC 05/09/2024 26.0 (L)  31.4 - 35.0 g/dL Final    RDW 05/09/2024 20.2 (H)  11.9 - 14.6 % Final    Platelets 05/09/2024 442  150 - 450 K/uL Final    MPV 05/09/2024 10.3  9.4 - 12.3 FL Final    nRBC 05/09/2024 0.11  0.0 - 0.2 K/uL Final    **Note: Absolute NRBC parameter is now reported with Hemogram**    Differential Type 05/09/2024 PENDING   Incomplete   Office Visit on 05/07/2024   Component Date Value Ref Range

## 2024-05-09 NOTE — TELEPHONE ENCOUNTER
Pt has appt with Hematology today.  Spoke with INGRIS Vicente.  Ok for patient to follow up with Hematology after transfusion vs repeat CBC with our office.

## 2024-05-09 NOTE — PROGRESS NOTES
Critical lab of HGB-6.5 received from laboratory, read back and verified. Provider and Clinical support staff notified of critical lab value needing to be addressed at today's visit and receipt confirmed.

## 2024-05-09 NOTE — PROGRESS NOTES
[facility]: Office Visit New Patient H/P    Chief Complaint:    CAROLYNE    History of Present Illness:  Referral Diagnosis: Other iron deficiency anemia     Referring Provider: Meagan Turner APRN - CNP     Primary Care Provider: None     Family History of Cancer/ Hematology Disorders: None reported      Presenting Symptoms: Menorrhagia, nausea, weakness      Ms. Foss is a 39 y.o. black female referred for CAROLYNE. She is a former pt of Dr. Delgado, with history of endometrial hyperplasia, fibroids, CAROLYNE requiring blood transfusions and iron infusions, and hysteroscopy with dilatation & curettage, last seen on 11/9/22.      6/15/22: Hysteroscopy with dilatation & curettage with GYN      7/6/22 - 7/7/22: Admitted at [facility] with CAROLYNE due to chronic blood loss 2/2 menorrhagia. She had experienced uncontrolled vaginal bleeding since D&C on 6/15/22. HGB was 4.2 on arrival to the ED and dropped to a low of 4.0. Pt received a total of 6 units of PRBCs. She was also treated with 250 mg IV Ferrlecit. GYN was consulted and pt was started on Provera and Megace.      7/23/22 - 8/20/22: Pt received 6 doses of IV Ferrlecit     10/4/22: HGB 11.0/HCT 37.0 (Epic/Labs)     10/5/22: D and C with hysteroscopy by Dr. Delgado; benign pathology      5/7/24: Presented to GYN for irregular vaginal bleeding. Pt reports heavy vaginal bleeding every day since 1/5/24 with associated nausea and weakness.   -Provider noted, “Given BMI suspect anovulatory Will bring back for TVUS to further eval stripe As desires conception, will avoid ongoing hormonal contraception, but will send 1x rx for aygestin to stop currently bleeding episode”  -CBC significant for RBC 3.71, HGB 6.6, HCT 24.9, MCV 67.1, MCH 17.8, MCHC 26.5, RDW 20.1  -TSH and Free T4 WNL (Epic/Labs)   -Per med rec pt is prescribed 324 mg ferrous gluconate PO daily     5/8/24: Urgent referral to [facility]  -2 units of PRBCs ordered; pt scheduled for transfusion with 2 units of

## 2024-05-10 ENCOUNTER — OFFICE VISIT (OUTPATIENT)
Dept: OBGYN CLINIC | Age: 39
End: 2024-05-10

## 2024-05-10 ENCOUNTER — HOSPITAL ENCOUNTER (OUTPATIENT)
Dept: INFUSION THERAPY | Age: 39
Setting detail: INFUSION SERIES
Discharge: HOME | End: 2024-05-10
Payer: COMMERCIAL

## 2024-05-10 ENCOUNTER — PROCEDURE VISIT (OUTPATIENT)
Dept: OBGYN CLINIC | Age: 39
End: 2024-05-10
Payer: COMMERCIAL

## 2024-05-10 VITALS
BODY MASS INDEX: 41.85 KG/M2 | SYSTOLIC BLOOD PRESSURE: 100 MMHG | HEIGHT: 63 IN | DIASTOLIC BLOOD PRESSURE: 60 MMHG | WEIGHT: 236.2 LBS

## 2024-05-10 VITALS
SYSTOLIC BLOOD PRESSURE: 156 MMHG | RESPIRATION RATE: 18 BRPM | DIASTOLIC BLOOD PRESSURE: 89 MMHG | TEMPERATURE: 98.4 F | HEART RATE: 82 BPM | OXYGEN SATURATION: 99 %

## 2024-05-10 DIAGNOSIS — D25.0 SUBMUCOUS UTERINE FIBROID: ICD-10-CM

## 2024-05-10 DIAGNOSIS — N92.6 IRREGULAR MENSES: ICD-10-CM

## 2024-05-10 DIAGNOSIS — N85.00 ENDOMETRIAL HYPERPLASIA: ICD-10-CM

## 2024-05-10 DIAGNOSIS — N92.1 MENORRHAGIA WITH IRREGULAR CYCLE: ICD-10-CM

## 2024-05-10 DIAGNOSIS — D50.0 IRON DEFICIENCY ANEMIA DUE TO CHRONIC BLOOD LOSS: Primary | ICD-10-CM

## 2024-05-10 DIAGNOSIS — N92.6 IRREGULAR BLEEDING: Primary | ICD-10-CM

## 2024-05-10 LAB — HISTORY CHECK: NORMAL

## 2024-05-10 PROCEDURE — 76830 TRANSVAGINAL US NON-OB: CPT | Performed by: OBSTETRICS & GYNECOLOGY

## 2024-05-10 PROCEDURE — 2580000003 HC RX 258: Performed by: NURSE PRACTITIONER

## 2024-05-10 PROCEDURE — 36430 TRANSFUSION BLD/BLD COMPNT: CPT

## 2024-05-10 PROCEDURE — P9040 RBC LEUKOREDUCED IRRADIATED: HCPCS

## 2024-05-10 RX ORDER — SODIUM CHLORIDE 9 MG/ML
INJECTION, SOLUTION INTRAVENOUS CONTINUOUS
Status: DISCONTINUED | OUTPATIENT
Start: 2024-05-10 | End: 2024-05-11 | Stop reason: HOSPADM

## 2024-05-10 RX ORDER — SODIUM CHLORIDE 0.9 % (FLUSH) 0.9 %
5-40 SYRINGE (ML) INJECTION PRN
Status: DISCONTINUED | OUTPATIENT
Start: 2024-05-10 | End: 2024-05-11 | Stop reason: HOSPADM

## 2024-05-10 RX ADMIN — SODIUM CHLORIDE, PRESERVATIVE FREE 10 ML: 5 INJECTION INTRAVENOUS at 13:00

## 2024-05-10 RX ADMIN — SODIUM CHLORIDE: 9 INJECTION, SOLUTION INTRAVENOUS at 13:00

## 2024-05-10 NOTE — PROGRESS NOTES
The patient is a 39 y.o.  who is seen for US and follow up; Patient reports having symptoms of weakness and fatigue as of current date. Typically sees Dr Aguilera, but was seen by me 3 days ago for c/o  irregular vaginal bleeding. Cycles have historicaly been anywhere from every 21-45 days. Pt has hx of endometrial hyperplasia. Treated with Megace by Dr. Delgado in . D&C performed 10/2022 was benign. HX of irregular menses.Pt states currently she has been having vaginal bleeding every day since 24. Bleeding described as heavy, changing a pad every 3-4 hours. Denies cramping or discomfort, nor does she typically have issues with dysmenorrhea.  Having nausea and weakness. Admits to vomiting x 1 episode today. Denies saturating a pad every hour. Denies pain/bleeding with intercourse. Has not had sex since bleeding started, but does desire to conceive. Labs ordered at last appt and hgb noted to be 6.6. Urgent referral placed to hematology is scheduled today for 2 units PRBCs and iron next week. Has not yet picked up aygestin rx.       US Findings Today: 05/10/2024    Transvag Gyn- irreg bleeding   CX- appears WNL   UT- anteverted/retroflexed, enlarged with fibroids, and heterogeneous   Fibroids seen:   F1- fundal sm vs distorting endo 8.1 x 7.5 x 7.8 cm (prev 8.7 x 7.6 x 6.9 m)   F2- lt ant ss near phil 3.8 x 3.8 x 4.3 cm   ENDO- 64.3 mm, very difficult to visualize due to fibroid location   Both ovaries are increased in volume and the number of follicles, c/w pcos   Bilateral adnexas appear WNL   No free fluid present                 HISTORY:      Patient's last menstrual period was 2024 (approximate).  Sexual History:  single partner, contraception - none  Contraception:  none  Current Outpatient Medications on File Prior to Visit   Medication Sig Dispense Refill    vitamin D (ERGOCALCIFEROL) 1.25 MG (15826 UT) CAPS capsule Take 1 capsule by mouth once a week 20 capsule 0    POTASSIUM PO Take by mouth

## 2024-05-10 NOTE — PROGRESS NOTES
Arrived to the Infusion Center.  2 units PRBCs completed. Patient tolerated well.   Any issues or concerns during appointment: none.  Discharged ambulatory.

## 2024-05-11 LAB
ABO + RH BLD: NORMAL
BLD PROD TYP BPU: NORMAL
BLD PROD TYP BPU: NORMAL
BLOOD BANK BLOOD PRODUCT EXPIRATION DATE: NORMAL
BLOOD BANK BLOOD PRODUCT EXPIRATION DATE: NORMAL
BLOOD BANK DISPENSE STATUS: NORMAL
BLOOD BANK DISPENSE STATUS: NORMAL
BLOOD BANK ISBT PRODUCT BLOOD TYPE: 7300
BLOOD BANK ISBT PRODUCT BLOOD TYPE: 7300
BLOOD BANK PRODUCT CODE: NORMAL
BLOOD BANK UNIT TYPE AND RH: NORMAL
BLOOD BANK UNIT TYPE AND RH: NORMAL
BLOOD GROUP ANTIBODIES SERPL: NORMAL
BPU ID: NORMAL
BPU ID: NORMAL
CROSSMATCH RESULT: NORMAL
CROSSMATCH RESULT: NORMAL
SPECIMEN EXP DATE BLD: NORMAL
UNIT DIVISION: 0
UNIT DIVISION: 0
UNIT ISSUE DATE/TIME: NORMAL
UNIT ISSUE DATE/TIME: NORMAL

## 2024-05-16 ENCOUNTER — OFFICE VISIT (OUTPATIENT)
Dept: OBGYN CLINIC | Age: 39
End: 2024-05-16
Payer: COMMERCIAL

## 2024-05-16 VITALS
WEIGHT: 239.1 LBS | HEIGHT: 63 IN | SYSTOLIC BLOOD PRESSURE: 115 MMHG | DIASTOLIC BLOOD PRESSURE: 77 MMHG | BODY MASS INDEX: 42.36 KG/M2

## 2024-05-16 DIAGNOSIS — D25.0 SUBMUCOUS UTERINE FIBROID: ICD-10-CM

## 2024-05-16 DIAGNOSIS — D50.0 IRON DEFICIENCY ANEMIA DUE TO CHRONIC BLOOD LOSS: ICD-10-CM

## 2024-05-16 DIAGNOSIS — N92.1 MENORRHAGIA WITH IRREGULAR CYCLE: ICD-10-CM

## 2024-05-16 DIAGNOSIS — N85.00 ENDOMETRIAL HYPERPLASIA: ICD-10-CM

## 2024-05-16 DIAGNOSIS — N92.6 IRREGULAR MENSES: ICD-10-CM

## 2024-05-16 PROCEDURE — 99214 OFFICE O/P EST MOD 30 MIN: CPT | Performed by: OBSTETRICS & GYNECOLOGY

## 2024-05-17 NOTE — PROGRESS NOTES
The patient presents to the office today after having seen us last for menorrhagia for the last five months three weeks ago.  She was evaluated with pelvic ultrasound which revealed a thickened endometrium and two large but stable fibroids.  She is well known to me as over the last few years, I have taken care of her for endometrial hyperplasia.  She was seen and treated with Megace by Dr. Delgado and ultimately cleared to attempt pregnancy approximately two years ago.  Ultimately, however, she has remained anovulatory occasionally throughout this time period and my impression is that her lining has thickened up again.    At this point in time, she states that her goals to become pregnant are still the same and she declines hysterectomy as a viable source of treatment, instead favoring hysteroscopy D&C with myomectomy which she believes will improve her bleeding.  I have suggested to her that the root of her bleeding is actually chronic anovulation and that the PCOS that she has requires constant treatment.  If pregnancy attempts are wanted, then ovulation induction should be offered to her.  If pregnancy is not desired, then suppressive therapy to ensure that her endometrial lining does not become hyperplastic again is wanted.    She is counseled that at this point, endometrial biopsy is indicated to rule out endometrial hyperplasia in that any subsequent surgery would depend on the results of this test.  She is also made aware that robotic myomectomy is not something that I offer but that two of my partners might be willing to offer her this surgery if that indeed is what ultimately is felt to be appropriate.  We will see her back for endometrial biopsy in the near future as indicated.  At this point in time, she is no longer bleeding due to the Aygestin which was provided to her by us.  I have refilled her prescription so that she may take this continuously until further notice.

## 2024-05-28 ENCOUNTER — TELEPHONE (OUTPATIENT)
Dept: OBGYN CLINIC | Age: 39
End: 2024-05-28

## 2024-05-28 NOTE — TELEPHONE ENCOUNTER
Patient contacted office and left a voicemail. Patient reports ongoing bleeding. Patient was last seen in office on 05/16/2024. Patient prescribed Aygestin. Nurse followed up with INDERJIT Rhodes regarding patient concerns. Per provider advised patient to continue taking Aygestin as directed. Nurse will follow up with patient .

## 2024-05-29 ENCOUNTER — OFFICE VISIT (OUTPATIENT)
Dept: OBGYN CLINIC | Age: 39
End: 2024-05-29
Payer: COMMERCIAL

## 2024-05-29 VITALS — HEIGHT: 63 IN | BODY MASS INDEX: 41.89 KG/M2 | WEIGHT: 236.4 LBS

## 2024-05-29 DIAGNOSIS — N92.0 MENORRHAGIA WITH REGULAR CYCLE: ICD-10-CM

## 2024-05-29 DIAGNOSIS — Z01.812 PRE-PROCEDURAL LABORATORY EXAMINATION: ICD-10-CM

## 2024-05-29 DIAGNOSIS — N92.1 MENORRHAGIA WITH IRREGULAR CYCLE: Primary | ICD-10-CM

## 2024-05-29 DIAGNOSIS — N85.02 ENDOMETRIAL HYPERPLASIA WITH ATYPIA: ICD-10-CM

## 2024-05-29 LAB
HCG, PREGNANCY, URINE, POC: NEGATIVE
VALID INTERNAL CONTROL, POC: POSITIVE

## 2024-05-29 PROCEDURE — 99024 POSTOP FOLLOW-UP VISIT: CPT | Performed by: OBSTETRICS & GYNECOLOGY

## 2024-05-29 PROCEDURE — 81025 URINE PREGNANCY TEST: CPT | Performed by: OBSTETRICS & GYNECOLOGY

## 2024-06-03 ENCOUNTER — TELEPHONE (OUTPATIENT)
Dept: OBGYN CLINIC | Age: 39
End: 2024-06-03

## 2024-06-03 NOTE — TELEPHONE ENCOUNTER
Nurse  contacted patient and reviewed results with patient. Patient voiced understanding. Nurse notified patient to continue medication Aygestin, as advised by Dr. Aguilera. Patient voiced understanding. Patient states not feeling well as of  current date. Patient requested status of FMLA. Nurse will follow up. No further concerns per patient as of current date. Call ended warmly.        ----- Message from Cruz Aguilera MD sent at 5/31/2024  3:51 PM EDT -----  Benign results.  I recommend continuing Aygestin until consult with Dr. Steinberg or Dr. Cosby for possible D&C and RA myomectomy.  Please facilitate appointment ASAP.

## 2024-06-07 ENCOUNTER — TELEPHONE (OUTPATIENT)
Dept: OBGYN CLINIC | Age: 39
End: 2024-06-07

## 2024-06-07 NOTE — TELEPHONE ENCOUNTER
Nurse contacted patient to follow up with patient. Patient states having ongoing bleeding as of current date. Patient reports symptoms of fatigue. Nurse advised patient to go to ER for worsening symptoms, if saturating pads every 30 minutes. Patient voiced understanding. Patient is scheduled for Preop appointment with Dr. Steinberg 06/17/2024. Call ended warmly with patient.

## 2024-06-17 ENCOUNTER — OFFICE VISIT (OUTPATIENT)
Dept: OBGYN CLINIC | Age: 39
End: 2024-06-17
Payer: COMMERCIAL

## 2024-06-17 VITALS
SYSTOLIC BLOOD PRESSURE: 124 MMHG | BODY MASS INDEX: 41.82 KG/M2 | DIASTOLIC BLOOD PRESSURE: 78 MMHG | WEIGHT: 236 LBS | HEIGHT: 63 IN

## 2024-06-17 DIAGNOSIS — D50.0 IRON DEFICIENCY ANEMIA DUE TO CHRONIC BLOOD LOSS: Primary | ICD-10-CM

## 2024-06-17 DIAGNOSIS — D21.9 FIBROIDS: ICD-10-CM

## 2024-06-17 DIAGNOSIS — N92.1 MENORRHAGIA WITH IRREGULAR CYCLE: ICD-10-CM

## 2024-06-17 LAB — HEMOGLOBIN, POC: 6.2 G/DL

## 2024-06-17 PROCEDURE — 85018 HEMOGLOBIN: CPT | Performed by: OBSTETRICS & GYNECOLOGY

## 2024-06-17 PROCEDURE — 99214 OFFICE O/P EST MOD 30 MIN: CPT | Performed by: OBSTETRICS & GYNECOLOGY

## 2024-06-17 NOTE — PROGRESS NOTES
Diana  is a 39 y.o. female, , No LMP recorded (lmp unknown). (Menstrual status: Irregular periods).,  who is seen for surgical consultation on possible D&C and robotic assisted myomectomy.     EMB pathology result from 2024: A: \"ENDOMETRIAL BIOPSY\":FRAGMENTS OF SECRETORY ENDOMETRIUM AND BENIGN ENDOCERVICAL GLAND     Ultrasound findings from 05/10/2024 secondary to irregular bleeding:  CX- appears WNL   UT- anteverted/retroflexed, enlarged with fibroids, and heterogeneous   Fibroids seen:   F1- fundal sm vs distorting endo 8.1 x 7.5 x 7.8 cm (prev 8.7 x 7.6 x 6.9 m)   F2- lt ant ss near phil 3.8 x 3.8 x 4.3 cm   ENDO- 64.3 mm, very difficult to visualize due to fibroid location   Both ovaries are increased in volume and the number of follicles, c/w pcos   Bilateral adnexas appear WNL   No free fluid present   Uterine Volume= 941.556 cm    Hemoglobin on 24 was 6.5  Hemoglobin today in the office: 6.2. Patient reports taking otc iron daily. Currently taking Aygestin 10 mg daily.   States bleeding varies from moderate to heavy with clots. Bleeding comes and goes.   Patient states she would like to have another baby in the future.     HISTORY:  Sexual History:  has sex with males  Contraception:  none  Current Outpatient Medications on File Prior to Visit   Medication Sig Dispense Refill    NONFORMULARY Take by mouth daily OTC iron daily      norethindrone (AYGESTIN) 5 MG tablet Take 2 tablets by mouth daily for 10 days 30 tablet 3    vitamin D (ERGOCALCIFEROL) 1.25 MG (92236 UT) CAPS capsule Take 1 capsule by mouth once a week 20 capsule 0     No current facility-administered medications on file prior to visit.       ROS:  Review of Systems     Diana  has a past medical history of Endometrial hyperplasia with atypia, Fibroid, Iron deficiency anemia, Menorrhagia, and Thickened endometrium. .    Previous surgeries include  has a past surgical history that includes Dilation and curettage of uterus

## 2024-06-18 ENCOUNTER — HOSPITAL ENCOUNTER (OUTPATIENT)
Dept: LAB | Age: 39
Discharge: HOME | End: 2024-06-21
Payer: COMMERCIAL

## 2024-06-18 PROBLEM — D21.9 FIBROIDS: Status: ACTIVE | Noted: 2024-06-26

## 2024-06-18 PROBLEM — D62 ACUTE ON CHRONIC BLOOD LOSS ANEMIA: Status: ACTIVE | Noted: 2024-06-26

## 2024-06-18 PROBLEM — N92.0 MENORRHAGIA: Status: ACTIVE | Noted: 2024-06-26

## 2024-06-18 LAB — HISTORY CHECK: NORMAL

## 2024-06-18 PROCEDURE — 86900 BLOOD TYPING SEROLOGIC ABO: CPT

## 2024-06-18 PROCEDURE — 36415 COLL VENOUS BLD VENIPUNCTURE: CPT

## 2024-06-18 PROCEDURE — 86901 BLOOD TYPING SEROLOGIC RH(D): CPT

## 2024-06-18 PROCEDURE — 86850 RBC ANTIBODY SCREEN: CPT

## 2024-06-18 PROCEDURE — 86923 COMPATIBILITY TEST ELECTRIC: CPT

## 2024-06-19 ENCOUNTER — HOSPITAL ENCOUNTER (EMERGENCY)
Age: 39
Discharge: HOME | End: 2024-06-19
Payer: COMMERCIAL

## 2024-06-19 ENCOUNTER — HOSPITAL ENCOUNTER (OUTPATIENT)
Dept: INFUSION THERAPY | Age: 39
Setting detail: INFUSION SERIES
Discharge: HOME | End: 2024-06-19
Payer: COMMERCIAL

## 2024-06-19 VITALS
OXYGEN SATURATION: 100 % | RESPIRATION RATE: 16 BRPM | DIASTOLIC BLOOD PRESSURE: 86 MMHG | HEART RATE: 95 BPM | SYSTOLIC BLOOD PRESSURE: 116 MMHG | TEMPERATURE: 98.1 F

## 2024-06-19 VITALS
RESPIRATION RATE: 20 BRPM | SYSTOLIC BLOOD PRESSURE: 147 MMHG | TEMPERATURE: 99.2 F | OXYGEN SATURATION: 100 % | DIASTOLIC BLOOD PRESSURE: 81 MMHG | HEART RATE: 84 BPM

## 2024-06-19 DIAGNOSIS — Z51.89 ENCOUNTER FOR BLOOD TRANSFUSION: Primary | ICD-10-CM

## 2024-06-19 PROCEDURE — P9040 RBC LEUKOREDUCED IRRADIATED: HCPCS

## 2024-06-19 PROCEDURE — 36430 TRANSFUSION BLD/BLD COMPNT: CPT

## 2024-06-19 PROCEDURE — 99211 OFF/OP EST MAY X REQ PHY/QHP: CPT

## 2024-06-19 PROCEDURE — 99285 EMERGENCY DEPT VISIT HI MDM: CPT

## 2024-06-19 RX ORDER — SODIUM CHLORIDE 0.9 % (FLUSH) 0.9 %
5-40 SYRINGE (ML) INJECTION PRN
Status: DISCONTINUED | OUTPATIENT
Start: 2024-06-19 | End: 2024-06-19 | Stop reason: HOSPADM

## 2024-06-19 RX ORDER — SODIUM CHLORIDE 9 MG/ML
INJECTION, SOLUTION INTRAVENOUS ONCE
Status: DISCONTINUED | OUTPATIENT
Start: 2024-06-19 | End: 2024-06-20 | Stop reason: HOSPADM

## 2024-06-19 RX ORDER — SODIUM CHLORIDE 9 MG/ML
INJECTION, SOLUTION INTRAVENOUS PRN
Status: DISCONTINUED | OUTPATIENT
Start: 2024-06-19 | End: 2024-06-19 | Stop reason: HOSPADM

## 2024-06-19 ASSESSMENT — ENCOUNTER SYMPTOMS
ABDOMINAL PAIN: 0
FACIAL SWELLING: 0
COUGH: 0
PHOTOPHOBIA: 0
TROUBLE SWALLOWING: 0
VOMITING: 0

## 2024-06-19 ASSESSMENT — LIFESTYLE VARIABLES
HOW MANY STANDARD DRINKS CONTAINING ALCOHOL DO YOU HAVE ON A TYPICAL DAY: PATIENT DOES NOT DRINK
HOW OFTEN DO YOU HAVE A DRINK CONTAINING ALCOHOL: NEVER

## 2024-06-19 NOTE — PROGRESS NOTES
Preop Visit    Ms. Diana Foss presents for a preop visit.  She is scheduled for a  OPEN UTERINE MYOMECTOMY . .  Her history, meds, and allergies were reviewed.  The procedure was reviewed in detail as well as the risks of bleeding, infection, DVT and potential surgical complications involving the bladder, ureters, colon or intestines.  Also the alternatives,  benefits, recovery and follow-up. Prevention of SSI discussed as indicated.  All of her questions were answered.    Exam:  Lungs CTAB  Heart RRR    See the hospital H&P as well as prior chart for details.    PATIENT COUNSELED REGARDING NEED FOR HYSTEROSCOPY DILATION AND CURETTAGE as well as OPEN UTERINE MYOMECTOMY, risks, benefits and alternatives.

## 2024-06-19 NOTE — CONSENT
Informed Consent for Blood Component Transfusion Note    I have discussed with the patient the rationale for blood component transfusion; its benefits in treating or preventing fatigue, organ damage, or death; and its risk which includes mild transfusion reactions, rare risk of blood borne infection, or more serious but rare reactions. I have discussed the alternatives to transfusion, including the risk and consequences of not receiving transfusion. The patient had an opportunity to ask questions and had agreed to proceed with transfusion of blood components.    Electronically signed by JORDAN BUTLER on 6/19/24 at 4:02 PM EDT

## 2024-06-19 NOTE — PROGRESS NOTES
Arrived to the Infusion Center.  Multiple attempts by multiple RNs to obtain PIV access unsuccessful.  Charge RN notified ordering MD office and pt to proceed to [facility] ED for IV access/2u PRBC.  Cancer center lab notified and 2u PRBC to be sent back to D for transfusion.  Pt KAM.    Discharged ambulatory.

## 2024-06-19 NOTE — DISCHARGE INSTRUCTIONS
Be sure to follow-up with your OB/GYN at your scheduled appointment.  Return here for new or worsening symptoms.    As we discussed, I did not find a life threatening cause of your symptoms today. However, THAT DOES NOT MEAN IT COULD NOT DEVELOP. If you develop ANY new or worsening symptoms, it is critical that you return for re-evaluation. This includes any symptoms that are concerning to you, especially symptoms such as loss of consciousness, abdominal pain, profuse vaginal bleeding.  If you do not return for re-evaluation, you risk serious complications, including death.

## 2024-06-19 NOTE — PROGRESS NOTES
In compliance with new JCAHO guidelines and per Dr. Steinberg's last encounter with pt on 6/17/24 pt has received multiple blood transfusions for profound anemia. Pt is in need of surgery, but also in need of stabilization of anemia before can proceed with surgical intervention. Per 6/17/24 note pt counseled on and is consenting to another transfusion and is aware of risks/benefits of transfusion.

## 2024-06-19 NOTE — ED PROVIDER NOTES
Emergency Department Provider Note       PCP: Not, On File (Inactive)   Age: 39 y.o.   Sex: female     DISPOSITION Discharge - Pending Orders Complete 06/19/2024 03:46:06 PM       ICD-10-CM    1. Encounter for blood transfusion  Z51.89           Medical Decision Making     In summary this is a 39-year-old female patient presented for evaluation requesting IV access that she could get ordered blood transfusions.  She has been following up with her OB/GYN due to fibroids causing heavy vaginal bleeding.  She has been requiring blood transfusions.  She was scheduled for an outpatient transfusion today but they were unable to obtain IV access so she was sent here.  We were able to achieve an ultrasound IV.  Blood transfusions have been ordered.  Patient will receive the blood transfusions and be discharged.  ED Course as of 06/19/24 1610   Wed Jun 19, 2024   1507 3:07 PM  Patient will receive 2 units of blood here and then be discharged [NR]      ED Course User Index  [NR] Benjy Claros PA     1 or more acute illnesses that pose a threat to life or bodily function.   Drug therapy given requiring intensive monitoring for toxicity.  Patient was discharged risks and benefits of hospitalization were considered.  Discussion with external consultants.  Chronic medical problems impacting care include fibroids.  Shared medical decision making was utilized in creating the patients health plan today.  ED attending physician present in department at time of care. Based on current hospital policy, their co-signature is not required on this note.   I independently ordered and reviewed each unique test.  I reviewed external records: provider visit note from outside specialist.  I reviewed external records: previous lab results from outside ED.               Critical care procedure note : 60 minutes of critical care time was performed in the emergency department. This was separate from any other procedures listed during the

## 2024-06-19 NOTE — ED TRIAGE NOTES
Pt instructed by infusion center to go to ER for IV access for blood transfusion admin. Pt to receive 2 units PRBCs. Hbg 6.5

## 2024-06-20 LAB
ABO + RH BLD: NORMAL
BLD PROD TYP BPU: NORMAL
BLD PROD TYP BPU: NORMAL
BLOOD BANK BLOOD PRODUCT EXPIRATION DATE: NORMAL
BLOOD BANK BLOOD PRODUCT EXPIRATION DATE: NORMAL
BLOOD BANK DISPENSE STATUS: NORMAL
BLOOD BANK DISPENSE STATUS: NORMAL
BLOOD BANK ISBT PRODUCT BLOOD TYPE: 7300
BLOOD BANK ISBT PRODUCT BLOOD TYPE: 7300
BLOOD BANK PRODUCT CODE: NORMAL
BLOOD BANK PRODUCT CODE: NORMAL
BLOOD BANK UNIT TYPE AND RH: NORMAL
BLOOD BANK UNIT TYPE AND RH: NORMAL
BLOOD GROUP ANTIBODIES SERPL: NORMAL
BPU ID: NORMAL
BPU ID: NORMAL
CROSSMATCH RESULT: NORMAL
CROSSMATCH RESULT: NORMAL
SPECIMEN EXP DATE BLD: NORMAL
UNIT DIVISION: 0
UNIT DIVISION: 0
UNIT ISSUE DATE/TIME: NORMAL
UNIT ISSUE DATE/TIME: NORMAL

## 2024-06-20 NOTE — ED NOTES
Patient mobility status  with no difficulty. Provider aware     I have reviewed discharge instructions with the patient.  The patient verbalized understanding.    Patient left ED via Discharge Method: ambulatory to Home with Significant Other.    Opportunity for questions and clarification provided.     Patient given 0 scripts.            Postol, Wendy, RN  06/19/24 2031

## 2024-06-24 ENCOUNTER — OFFICE VISIT (OUTPATIENT)
Dept: OBGYN CLINIC | Age: 39
End: 2024-06-24

## 2024-06-24 VITALS — BODY MASS INDEX: 42.4 KG/M2 | WEIGHT: 239.3 LBS | HEIGHT: 63 IN

## 2024-06-24 DIAGNOSIS — Z01.818 PREOP EXAMINATION: Primary | ICD-10-CM

## 2024-06-24 NOTE — H&P
Gynecology History and Physical    Name: Diana Foss MRN: [id number] SSN: [id number]    YOB: 1985  Age: 39 y.o.  Sex: female       Subjective:      Chief complaint:  Anemia, Fibroids, and Menorrhagia    Diana is a 39 y.o.  female with a history of anemia, fibroids, and menorrhagia.  History of endometrial hyperplasia, treated by Gyn oncology and then released to attempt pregnancy.  Recent negative endometrial biopsy.  Patient has enlarged fibroid uterus, with a dominant fundal fibroid measuring 9 cm in greatest dimension.  Another 2.5cm intramural fibroid is also present.  She continues to desire to retain fertility.    She is admitted for Procedure(s) (LRB):  OPEN UTERINE MYOMECTOMY (N/A). And HYSTEROSCOPY D&C    The current method of family planning is none.    OB History          1    Para   1    Term   1            AB        Living   1         SAB        IAB        Ectopic        Molar        Multiple        Live Births   1              Past Medical History:   Diagnosis Date    Endometrial hyperplasia with atypia     Fibroid     History of blood transfusion     Iron deficiency anemia     last transfusion 2022    Keloid of skin     Menorrhagia 2016    Thickened endometrium      Past Surgical History:   Procedure Laterality Date    APPENDECTOMY      CHOLECYSTECTOMY      DILATION AND CURETTAGE OF UTERUS  2016    By Dr. Martinez    DILATION AND CURETTAGE OF UTERUS N/A 06/15/2022    DILATATION AND CURETTAGE HYSTEROSCOPY performed by Cruz Aguilera MD at [facility] MAIN OR    DILATION AND CURETTAGE OF UTERUS N/A 10/04/2022    DILATATION AND CURETTAGE HYSTEROSCOPY performed by Car Delgado MD at [facility] O[facility]     Social History     Occupational History    Not on file   Tobacco Use    Smoking status: Never     Passive exposure: Never    Smokeless tobacco: Never   Vaping Use    Vaping Use: Never used   Substance and Sexual Activity    Alcohol use: Never    Drug use:

## 2024-06-25 ENCOUNTER — ANESTHESIA EVENT (OUTPATIENT)
Dept: SURGERY | Age: 39
End: 2024-06-25
Payer: COMMERCIAL

## 2024-06-26 ENCOUNTER — PREP FOR PROCEDURE (OUTPATIENT)
Dept: OBGYN CLINIC | Age: 39
End: 2024-06-26

## 2024-06-26 ENCOUNTER — ANESTHESIA (OUTPATIENT)
Dept: SURGERY | Age: 39
End: 2024-06-26
Payer: COMMERCIAL

## 2024-06-26 ENCOUNTER — HOSPITAL ENCOUNTER (OUTPATIENT)
Age: 39
Setting detail: OBSERVATION
Discharge: HOME | End: 2024-06-26
Attending: OBSTETRICS & GYNECOLOGY | Admitting: OBSTETRICS & GYNECOLOGY
Payer: COMMERCIAL

## 2024-06-26 VITALS
WEIGHT: 235.89 LBS | TEMPERATURE: 97.7 F | HEIGHT: 63 IN | OXYGEN SATURATION: 99 % | SYSTOLIC BLOOD PRESSURE: 148 MMHG | RESPIRATION RATE: 19 BRPM | HEART RATE: 80 BPM | BODY MASS INDEX: 41.8 KG/M2 | DIASTOLIC BLOOD PRESSURE: 89 MMHG

## 2024-06-26 DIAGNOSIS — D62 ACUTE ON CHRONIC BLOOD LOSS ANEMIA: ICD-10-CM

## 2024-06-26 DIAGNOSIS — D21.9 FIBROIDS: Primary | ICD-10-CM

## 2024-06-26 DIAGNOSIS — D50.0 ANEMIA DUE TO CHRONIC BLOOD LOSS: ICD-10-CM

## 2024-06-26 DIAGNOSIS — D21.9 FIBROIDS: ICD-10-CM

## 2024-06-26 DIAGNOSIS — N92.1 MENORRHAGIA WITH IRREGULAR CYCLE: ICD-10-CM

## 2024-06-26 LAB
ABO + RH BLD: NORMAL
BLOOD GROUP ANTIBODIES SERPL: NORMAL
HCG UR QL: NEGATIVE
HGB BLD-MCNC: 8.6 G/DL (ref 11.7–15.4)
SPECIMEN EXP DATE BLD: NORMAL

## 2024-06-26 PROCEDURE — 6360000002 HC RX W HCPCS: Performed by: NURSE ANESTHETIST, CERTIFIED REGISTERED

## 2024-06-26 PROCEDURE — 86850 RBC ANTIBODY SCREEN: CPT

## 2024-06-26 PROCEDURE — 81025 URINE PREGNANCY TEST: CPT

## 2024-06-26 PROCEDURE — 3700000000 HC ANESTHESIA ATTENDED CARE: Performed by: OBSTETRICS & GYNECOLOGY

## 2024-06-26 PROCEDURE — 7100000010 HC PHASE II RECOVERY - FIRST 15 MIN: Performed by: OBSTETRICS & GYNECOLOGY

## 2024-06-26 PROCEDURE — 3600000005 HC SURGERY LEVEL 5 BASE: Performed by: OBSTETRICS & GYNECOLOGY

## 2024-06-26 PROCEDURE — 86901 BLOOD TYPING SEROLOGIC RH(D): CPT

## 2024-06-26 PROCEDURE — 2500000003 HC RX 250 WO HCPCS: Performed by: NURSE ANESTHETIST, CERTIFIED REGISTERED

## 2024-06-26 PROCEDURE — 3700000001 HC ADD 15 MINUTES (ANESTHESIA): Performed by: OBSTETRICS & GYNECOLOGY

## 2024-06-26 PROCEDURE — 88305 TISSUE EXAM BY PATHOLOGIST: CPT

## 2024-06-26 PROCEDURE — 86900 BLOOD TYPING SEROLOGIC ABO: CPT

## 2024-06-26 PROCEDURE — 7100000001 HC PACU RECOVERY - ADDTL 15 MIN: Performed by: OBSTETRICS & GYNECOLOGY

## 2024-06-26 PROCEDURE — 6370000000 HC RX 637 (ALT 250 FOR IP): Performed by: ANESTHESIOLOGY

## 2024-06-26 PROCEDURE — 85018 HEMOGLOBIN: CPT

## 2024-06-26 PROCEDURE — 3600000015 HC SURGERY LEVEL 5 ADDTL 15MIN: Performed by: OBSTETRICS & GYNECOLOGY

## 2024-06-26 PROCEDURE — 2580000003 HC RX 258: Performed by: NURSE ANESTHETIST, CERTIFIED REGISTERED

## 2024-06-26 PROCEDURE — 7100000011 HC PHASE II RECOVERY - ADDTL 15 MIN: Performed by: OBSTETRICS & GYNECOLOGY

## 2024-06-26 PROCEDURE — 6360000002 HC RX W HCPCS: Performed by: OBSTETRICS & GYNECOLOGY

## 2024-06-26 PROCEDURE — 2709999900 HC NON-CHARGEABLE SUPPLY: Performed by: OBSTETRICS & GYNECOLOGY

## 2024-06-26 PROCEDURE — 7100000000 HC PACU RECOVERY - FIRST 15 MIN: Performed by: OBSTETRICS & GYNECOLOGY

## 2024-06-26 RX ORDER — ONDANSETRON 2 MG/ML
INJECTION INTRAMUSCULAR; INTRAVENOUS PRN
Status: DISCONTINUED | OUTPATIENT
Start: 2024-06-26 | End: 2024-06-26 | Stop reason: SDUPTHER

## 2024-06-26 RX ORDER — FENTANYL CITRATE 50 UG/ML
INJECTION, SOLUTION INTRAMUSCULAR; INTRAVENOUS PRN
Status: DISCONTINUED | OUTPATIENT
Start: 2024-06-26 | End: 2024-06-26 | Stop reason: SDUPTHER

## 2024-06-26 RX ORDER — SODIUM CHLORIDE 0.9 % (FLUSH) 0.9 %
5-40 SYRINGE (ML) INJECTION EVERY 12 HOURS SCHEDULED
Status: DISCONTINUED | OUTPATIENT
Start: 2024-06-26 | End: 2024-06-26 | Stop reason: HOSPADM

## 2024-06-26 RX ORDER — FENTANYL CITRATE 50 UG/ML
100 INJECTION, SOLUTION INTRAMUSCULAR; INTRAVENOUS
Status: DISCONTINUED | OUTPATIENT
Start: 2024-06-26 | End: 2024-06-26 | Stop reason: HOSPADM

## 2024-06-26 RX ORDER — OXYCODONE HYDROCHLORIDE AND ACETAMINOPHEN 5; 325 MG/1; MG/1
1 TABLET ORAL EVERY 6 HOURS PRN
Qty: 12 TABLET | Refills: 0 | Status: SHIPPED | OUTPATIENT
Start: 2024-06-26 | End: 2024-06-29

## 2024-06-26 RX ORDER — MEDROXYPROGESTERONE ACETATE 10 MG/1
10 TABLET ORAL 2 TIMES DAILY
Qty: 60 TABLET | Refills: 3 | Status: SHIPPED | OUTPATIENT
Start: 2024-06-26

## 2024-06-26 RX ORDER — KETAMINE HYDROCHLORIDE 50 MG/ML
INJECTION, SOLUTION INTRAMUSCULAR; INTRAVENOUS PRN
Status: DISCONTINUED | OUTPATIENT
Start: 2024-06-26 | End: 2024-06-26 | Stop reason: SDUPTHER

## 2024-06-26 RX ORDER — DEXAMETHASONE SODIUM PHOSPHATE 10 MG/ML
INJECTION INTRAMUSCULAR; INTRAVENOUS PRN
Status: DISCONTINUED | OUTPATIENT
Start: 2024-06-26 | End: 2024-06-26 | Stop reason: SDUPTHER

## 2024-06-26 RX ORDER — IPRATROPIUM BROMIDE AND ALBUTEROL SULFATE 2.5; .5 MG/3ML; MG/3ML
1 SOLUTION RESPIRATORY (INHALATION)
Status: DISCONTINUED | OUTPATIENT
Start: 2024-06-26 | End: 2024-06-26 | Stop reason: HOSPADM

## 2024-06-26 RX ORDER — MIDAZOLAM HYDROCHLORIDE 1 MG/ML
INJECTION INTRAMUSCULAR; INTRAVENOUS PRN
Status: DISCONTINUED | OUTPATIENT
Start: 2024-06-26 | End: 2024-06-26 | Stop reason: SDUPTHER

## 2024-06-26 RX ORDER — HALOPERIDOL 5 MG/ML
1 INJECTION INTRAMUSCULAR
Status: DISCONTINUED | OUTPATIENT
Start: 2024-06-26 | End: 2024-06-26 | Stop reason: HOSPADM

## 2024-06-26 RX ORDER — LIDOCAINE HYDROCHLORIDE 20 MG/ML
INJECTION, SOLUTION EPIDURAL; INFILTRATION; INTRACAUDAL; PERINEURAL PRN
Status: DISCONTINUED | OUTPATIENT
Start: 2024-06-26 | End: 2024-06-26 | Stop reason: SDUPTHER

## 2024-06-26 RX ORDER — SODIUM CHLORIDE 9 MG/ML
INJECTION, SOLUTION INTRAVENOUS PRN
Status: DISCONTINUED | OUTPATIENT
Start: 2024-06-26 | End: 2024-06-26 | Stop reason: HOSPADM

## 2024-06-26 RX ORDER — IBUPROFEN 600 MG/1
600 TABLET ORAL 4 TIMES DAILY PRN
Qty: 40 TABLET | Refills: 0 | Status: SHIPPED | OUTPATIENT
Start: 2024-06-26

## 2024-06-26 RX ORDER — PROPOFOL 10 MG/ML
INJECTION, EMULSION INTRAVENOUS PRN
Status: DISCONTINUED | OUTPATIENT
Start: 2024-06-26 | End: 2024-06-26 | Stop reason: SDUPTHER

## 2024-06-26 RX ORDER — LIDOCAINE HYDROCHLORIDE 10 MG/ML
1 INJECTION, SOLUTION INFILTRATION; PERINEURAL
Status: DISCONTINUED | OUTPATIENT
Start: 2024-06-26 | End: 2024-06-26 | Stop reason: HOSPADM

## 2024-06-26 RX ORDER — PROCHLORPERAZINE EDISYLATE 5 MG/ML
5 INJECTION INTRAMUSCULAR; INTRAVENOUS
Status: DISCONTINUED | OUTPATIENT
Start: 2024-06-26 | End: 2024-06-26 | Stop reason: HOSPADM

## 2024-06-26 RX ORDER — SODIUM CHLORIDE 0.9 % (FLUSH) 0.9 %
5-40 SYRINGE (ML) INJECTION PRN
Status: DISCONTINUED | OUTPATIENT
Start: 2024-06-26 | End: 2024-06-26 | Stop reason: HOSPADM

## 2024-06-26 RX ORDER — SODIUM CHLORIDE 9 MG/ML
INJECTION, SOLUTION INTRAVENOUS PRN
Status: DISCONTINUED | OUTPATIENT
Start: 2024-06-26 | End: 2024-06-26

## 2024-06-26 RX ORDER — ROCURONIUM BROMIDE 10 MG/ML
INJECTION, SOLUTION INTRAVENOUS PRN
Status: DISCONTINUED | OUTPATIENT
Start: 2024-06-26 | End: 2024-06-26 | Stop reason: SDUPTHER

## 2024-06-26 RX ORDER — NALOXONE HYDROCHLORIDE 0.4 MG/ML
INJECTION, SOLUTION INTRAMUSCULAR; INTRAVENOUS; SUBCUTANEOUS PRN
Status: DISCONTINUED | OUTPATIENT
Start: 2024-06-26 | End: 2024-06-26 | Stop reason: HOSPADM

## 2024-06-26 RX ORDER — SODIUM CHLORIDE, SODIUM LACTATE, POTASSIUM CHLORIDE, CALCIUM CHLORIDE 600; 310; 30; 20 MG/100ML; MG/100ML; MG/100ML; MG/100ML
INJECTION, SOLUTION INTRAVENOUS CONTINUOUS PRN
Status: DISCONTINUED | OUTPATIENT
Start: 2024-06-26 | End: 2024-06-26 | Stop reason: SDUPTHER

## 2024-06-26 RX ORDER — ACETAMINOPHEN 500 MG
1000 TABLET ORAL ONCE
Status: COMPLETED | OUTPATIENT
Start: 2024-06-26 | End: 2024-06-26

## 2024-06-26 RX ORDER — ONDANSETRON 2 MG/ML
INJECTION INTRAMUSCULAR; INTRAVENOUS
Status: COMPLETED
Start: 2024-06-26 | End: 2024-06-26

## 2024-06-26 RX ORDER — SODIUM CHLORIDE, SODIUM LACTATE, POTASSIUM CHLORIDE, CALCIUM CHLORIDE 600; 310; 30; 20 MG/100ML; MG/100ML; MG/100ML; MG/100ML
INJECTION, SOLUTION INTRAVENOUS CONTINUOUS
Status: DISCONTINUED | OUTPATIENT
Start: 2024-06-26 | End: 2024-06-26 | Stop reason: HOSPADM

## 2024-06-26 RX ORDER — OXYCODONE HYDROCHLORIDE 5 MG/1
5 TABLET ORAL
Status: DISCONTINUED | OUTPATIENT
Start: 2024-06-26 | End: 2024-06-26 | Stop reason: HOSPADM

## 2024-06-26 RX ORDER — MIDAZOLAM HYDROCHLORIDE 2 MG/2ML
2 INJECTION, SOLUTION INTRAMUSCULAR; INTRAVENOUS
Status: DISCONTINUED | OUTPATIENT
Start: 2024-06-26 | End: 2024-06-26 | Stop reason: HOSPADM

## 2024-06-26 RX ADMIN — LIDOCAINE HYDROCHLORIDE 60 MG: 20 INJECTION, SOLUTION EPIDURAL; INFILTRATION; INTRACAUDAL; PERINEURAL at 11:28

## 2024-06-26 RX ADMIN — ONDANSETRON 4 MG: 2 INJECTION INTRAMUSCULAR; INTRAVENOUS at 12:22

## 2024-06-26 RX ADMIN — KETAMINE HYDROCHLORIDE 20 MG: 50 INJECTION, SOLUTION INTRAMUSCULAR; INTRAVENOUS at 11:28

## 2024-06-26 RX ADMIN — SODIUM CHLORIDE, SODIUM LACTATE, POTASSIUM CHLORIDE, AND CALCIUM CHLORIDE: 600; 310; 30; 20 INJECTION, SOLUTION INTRAVENOUS at 11:09

## 2024-06-26 RX ADMIN — DEXAMETHASONE SODIUM PHOSPHATE 8 MG: 10 INJECTION INTRAMUSCULAR; INTRAVENOUS at 11:36

## 2024-06-26 RX ADMIN — SUGAMMADEX 400 MG: 100 INJECTION, SOLUTION INTRAVENOUS at 12:09

## 2024-06-26 RX ADMIN — ROCURONIUM BROMIDE 50 MG: 10 INJECTION, SOLUTION INTRAVENOUS at 11:28

## 2024-06-26 RX ADMIN — Medication 2 G: at 11:36

## 2024-06-26 RX ADMIN — MIDAZOLAM 2 MG: 1 INJECTION INTRAMUSCULAR; INTRAVENOUS at 11:13

## 2024-06-26 RX ADMIN — ACETAMINOPHEN 1000 MG: 500 TABLET, FILM COATED ORAL at 10:04

## 2024-06-26 RX ADMIN — FENTANYL CITRATE 100 MCG: 50 INJECTION, SOLUTION INTRAMUSCULAR; INTRAVENOUS at 11:28

## 2024-06-26 RX ADMIN — PROPOFOL 200 MG: 10 INJECTION, EMULSION INTRAVENOUS at 11:28

## 2024-06-26 ASSESSMENT — PAIN SCALES - GENERAL
PAINLEVEL_OUTOF10: 0

## 2024-06-26 NOTE — PERIOP NOTE
Patient verified name and .  Order for consent not found in EHR     Type 2 surgery, PAT phone assessment complete.  Orders not received.  Labs per surgeon: None  Labs per anesthesia protocol: POC Hgb done in Dr. Aguilera's office with result of 6.2 noted in gyn office note 2024. Pt went to Infusion center 2024 and they were unable to find peripheral IV site. Pt was sent to ED and with use of US IV was obtained and pt received 2 units of PRBC's. No follow up Hgb was ordered but pt states she has appointment with Dr. Aguilera on 2024. Anesthesia notified of above information and chart sent for review.     Patient answered medical/surgical history questions at their best of ability. All prior to admission medications documented in EPIC.    Patient instructed to continue taking all prescription medications up to the day of surgery but to take only the following medications the day of surgery according to anesthesia guidelines with a small sip of water: Norethindrone.  Also, patient is requested to take 2 Tylenol in the morning and then again before bed on the day before surgery. Regular or extra strength may be used.       Patient informed that all vitamins and supplements should be held 7 days prior to surgery and NSAIDS 5 days prior to surgery. Prescription meds to hold:None    Patient instructed on the following:    > Arrive at A Entrance, time of arrival to be called the day before by 1700  > NPO after midnight, unless otherwise indicated, including gum, mints, and ice chips  > Responsible adult must drive patient to the hospital, stay during surgery, and patient will need supervision 24 hours after anesthesia  > Use non moisturizing soap in shower the night before surgery and on the morning of surgery  > All piercings must be removed prior to arrival.    > Leave all valuables (money and jewelry) at home but bring insurance card and ID on DOS.   > You may be required to pay a deductible or 
MD Hernandez at bedside with patient. Pt VSS stable. Pain and Nausea controlled at this time. Verbal sign out per MD when PACU care is completed. Plan of care continues.     
found:  [email or web address]

## 2024-06-26 NOTE — OP NOTE
[facility] [address]  [address]                            OPERATIVE REPORT      PATIENT NAME: WILLY MONTEJO         : 1985  MED REC NO: [id number]                       ROOM: [facility] NO: [id number]                       ADMIT DATE: 2024  PROVIDER: Cruz Aguilera MD    DATE OF SERVICE:  2024    PREOPERATIVE DIAGNOSES:       1. Acute-on-chronic blood loss anemia.     2. Menorrhagia.     3. Enlarged fibroid uterus.    POSTOPERATIVE DIAGNOSES:       1. Acute-on-chronic blood loss anemia.     2. Menorrhagia.     3. Enlarged fibroid uterus.     4. Findings concerning for endometrial hyperplasia or neoplasia.    PROCEDURES PERFORMED:  Hysteroscopy, D and C with aborted abdominal myomectomy.    SURGEON:  Cruz Aguilera MD    ASSISTANT:  Dr. Charlee Helms.    ANESTHESIA:  General endotracheal anesthesia.    ESTIMATED BLOOD LOSS:  50 mL.    SPECIMENS REMOVED:  Large amount of endometrial curettings    INTRAOPERATIVE FINDINGS:       1. Normal-appearing external genitals, vulva, vagina, and a fishmouth appearing cervix.  Markedly enlarged endometrial cavity measuring 20 cm x approximately 10 cm estimated hysteroscopically.     2. Copious amounts of endometrial curettings collected.  Tubal ostia visible in the corners of the fundus.  They were symmetric and in appropriate location.  There was no visible evidence of the surface irregularities or asymmetry to suggest upon penetration or foreign object.     COMPLICATIONS:  None.    DESCRIPTION OF PROCEDURE:  The patient was taken to the operating room, where general anesthesia was obtained without difficulty.  She was then sterilely prepped and draped in the dorsal lithotomy position with legs in Mark stirrups in the usual sterile fashion.  After confirming the patient identification and the nature of the procedure, the weighted sterile speculum was placed in the

## 2024-06-26 NOTE — ANESTHESIA POSTPROCEDURE EVALUATION
Department of Anesthesiology  Postprocedure Note    Patient: Diana Foss  MRN: [id number]  YOB: 1985  Date of evaluation: 6/26/2024    Procedure Summary       Date: 06/26/24 Room / Location: [facility] MAIN OR 07 / [facility] MAIN OR    Anesthesia Start: 1109 Anesthesia Stop: 1227    Procedure: D&C HYSTEROSCOPY (Vagina ) Diagnosis:       Acute on chronic blood loss anemia      Fibroids      Menorrhagia      (Acute on chronic blood loss anemia [D62])      (Fibroids [D21.9])      (Menorrhagia [N92.0])    Surgeons: Cruz Aguilera MD Responsible Provider: Nikunj Hernandez MD    Anesthesia Type: general ASA Status: 3            Anesthesia Type: No value filed.    Dany Phase I: Dany Score: 10    Dany Phase II:      Anesthesia Post Evaluation    Patient location during evaluation: PACU  Patient participation: complete - patient participated  Level of consciousness: awake and alert  Airway patency: patent  Nausea & Vomiting: no nausea and no vomiting  Cardiovascular status: hemodynamically stable  Respiratory status: acceptable, nonlabored ventilation and spontaneous ventilation  Hydration status: euvolemic  Comments: BP (!) 148/98   Pulse 77   Temp 97.7 °F (36.5 °C) (Temporal)   Resp 19   Ht 1.6 m (5' 3\")   Wt 107 kg (235 lb 14.3 oz)   LMP  (LMP Unknown)   SpO2 100%   BMI 41.79 kg/m²     Multimodal analgesia pain management approach  Pain management: adequate and satisfactory to patient    No notable events documented.

## 2024-06-26 NOTE — BRIEF OP NOTE
Brief Postoperative Note      Patient: Diana Foss  YOB: 1985  MRN: [id number]    Date of Procedure: 6/26/2024    Pre-Op Diagnosis Codes:     * Acute on chronic blood loss anemia [D62]     * Fibroids [D21.9]     * Menorrhagia [N92.0]    Post-Op Diagnosis: Same        Procedure(s):  D&C HYSTEROSCOPY    Surgeon(s):  Cruz Aguilera MD Alt, Charlee POWELL DO    Assistant:  * No surgical staff found *    Anesthesia: General    Estimated Blood Loss (mL): less than 50     Complications: None    Specimens:   ID Type Source Tests Collected by Time Destination   A : endometrial curettings Tissue Tissue SURGICAL PATHOLOGY Cruz Aguilera MD 6/26/2024 1154        Findings: enlarged endometrial cavity with copious amounts of various colored tissue concerning for hyperplastic or neoplastic process, precluding opportunity to remove fibroids abdominally putting her at risk of upstaging.      Electronically signed by Cruz Aguilera MD on 6/26/2024 at 12:25 PM

## 2024-06-26 NOTE — ANESTHESIA PRE PROCEDURE
Department of Anesthesiology  Preprocedure Note       Name:  Diana Foss   Age:  39 y.o.  :  1985                                          MRN:  [id number]         Date:  2024      Surgeon: Surgeon(s):  Cruz Aguilera MD Alt, Brandi K, DO    Procedure: Procedure(s):  HYSTEROSCOPY MYOMECTOMY    Medications prior to admission:   Prior to Admission medications    Medication Sig Start Date End Date Taking? Authorizing Provider   NONFORMULARY Take by mouth daily OTC iron daily    Provider, MD Carmen   norethindrone (AYGESTIN) 5 MG tablet Take 2 tablets by mouth daily for 10 days 24  Cruz Aguilera MD   vitamin D (ERGOCALCIFEROL) 1.25 MG (45542 UT) CAPS capsule Take 1 capsule by mouth once a week 24   Meagan Turner APRN - CNP       Current medications:    Current Facility-Administered Medications   Medication Dose Route Frequency Provider Last Rate Last Admin   • sodium chloride flush 0.9 % injection 5-40 mL  5-40 mL IntraVENous 2 times per day Cruz Aguilera MD       • sodium chloride flush 0.9 % injection 5-40 mL  5-40 mL IntraVENous PRN Cruz Aguilera MD       • 0.9 % sodium chloride infusion   IntraVENous PRN Cruz Aguilera MD       • ceFAZolin (ANCEF) 2000 mg in sterile water 20 mL IV syringe  2,000 mg IntraVENous On Call to OR Cruz Aguilera MD       • lidocaine 1 % injection 1 mL  1 mL IntraDERmal Once PRN Jesse Helm MD       • acetaminophen (TYLENOL) tablet 1,000 mg  1,000 mg Oral Once Jesse Helm MD       • fentaNYL (SUBLIMAZE) injection 100 mcg  100 mcg IntraVENous Once PRN Jesse Helm MD       • lactated ringers IV soln infusion   IntraVENous Continuous Jesse Helm MD       • sodium chloride flush 0.9 % injection 5-40 mL  5-40 mL IntraVENous 2 times per day Jesse Helm MD       • sodium chloride flush 0.9 % injection 5-40 mL  5-40 mL IntraVENous PRN Jesse Helm MD       • midazolam PF (VERSED)

## 2024-07-03 PROBLEM — N93.9 ABNORMAL UTERINE BLEEDING (AUB): Status: ACTIVE | Noted: 2024-07-26

## 2024-07-08 ENCOUNTER — OFFICE VISIT (OUTPATIENT)
Dept: OBGYN CLINIC | Age: 39
End: 2024-07-08
Payer: COMMERCIAL

## 2024-07-08 VITALS
WEIGHT: 238.4 LBS | SYSTOLIC BLOOD PRESSURE: 120 MMHG | HEIGHT: 63 IN | DIASTOLIC BLOOD PRESSURE: 80 MMHG | BODY MASS INDEX: 42.24 KG/M2

## 2024-07-08 DIAGNOSIS — D25.1 INTRAMURAL LEIOMYOMA OF UTERUS: Primary | ICD-10-CM

## 2024-07-08 LAB
BASOPHILS # BLD: 0.1 K/UL (ref 0–0.2)
BASOPHILS NFR BLD: 1 % (ref 0–2)
DIFFERENTIAL METHOD BLD: ABNORMAL
EOSINOPHIL # BLD: 0.3 K/UL (ref 0–0.8)
EOSINOPHIL NFR BLD: 6 % (ref 0.5–7.8)
ERYTHROCYTE [DISTWIDTH] IN BLOOD BY AUTOMATED COUNT: 20.7 % (ref 11.9–14.6)
HCT VFR BLD AUTO: 31.2 % (ref 35.8–46.3)
HGB BLD-MCNC: 8.5 G/DL (ref 11.7–15.4)
IMM GRANULOCYTES # BLD AUTO: 0 K/UL (ref 0–0.5)
IMM GRANULOCYTES NFR BLD AUTO: 0 % (ref 0–5)
LYMPHOCYTES # BLD: 1.5 K/UL (ref 0.5–4.6)
LYMPHOCYTES NFR BLD: 28 % (ref 13–44)
MCH RBC QN AUTO: 21.1 PG (ref 26.1–32.9)
MCHC RBC AUTO-ENTMCNC: 27.2 G/DL (ref 31.4–35)
MCV RBC AUTO: 77.6 FL (ref 82–102)
MONOCYTES # BLD: 0.5 K/UL (ref 0.1–1.3)
MONOCYTES NFR BLD: 9 % (ref 4–12)
NEUTS SEG # BLD: 2.9 K/UL (ref 1.7–8.2)
NEUTS SEG NFR BLD: 55 % (ref 43–78)
NRBC # BLD: 0 K/UL (ref 0–0.2)
PLATELET # BLD AUTO: 601 K/UL (ref 150–450)
PMV BLD AUTO: 11.5 FL (ref 9.4–12.3)
RBC # BLD AUTO: 4.02 M/UL (ref 4.05–5.2)
WBC # BLD AUTO: 5.3 K/UL (ref 4.3–11.1)

## 2024-07-08 PROCEDURE — 99213 OFFICE O/P EST LOW 20 MIN: CPT | Performed by: OBSTETRICS & GYNECOLOGY

## 2024-07-08 NOTE — PROGRESS NOTES
Diana presents for postop visit from Hysteroscopy, D and C with aborted abdominal myomectomy.  about 2 weeks ago.  Doing well postoperatively.without any bleeding.  Fever: NO Voiding well: YES.  Bowel movements OK: YES but reports some intermittent constipation.  /80   Ht 1.6 m (5' 3\")   Wt 108.1 kg (238 lb 6.4 oz)   LMP  (LMP Unknown)   BMI 42.23 kg/m²     Pathology:  \"ENDOMETRIAL CURETTINGS\":  BENIGN ENDOMETRIUM WITH STROMAL CHANGES   CONSISTENT WITH PROGESTIN EFFECT.      Exam: A&OX3, NAD.    Reviewed op note and surgical photos with patient/.   A/P.  Stable Post op condition.  Gradually increase activity.  Follow up 3 days with Dr Steinberg to discuss myomectomy ( still wishes to retain her fertility)  Check Hgb today, has been as low as 8

## 2024-07-09 ENCOUNTER — TELEPHONE (OUTPATIENT)
Dept: OBGYN CLINIC | Age: 39
End: 2024-07-09

## 2024-07-09 NOTE — TELEPHONE ENCOUNTER
----- Message from Nick Roberts MD sent at 7/9/2024 10:51 AM EDT -----  Regarding: Hgb  Hgb still low, stay on Fe pills  ----- Message -----  From: Ngozi Pete Incoming [place] [facility]  Sent: 7/8/2024   9:14 PM EDT  To: Nick Roberts MD

## 2024-07-09 NOTE — PROGRESS NOTES
Preop Visit    Ms. Diana Foss presents for a surgical consultation visit.  She is scheduled for a Open Uterine Myomectomy on 07/26/2024. Her history, meds, and allergies were reviewed.  The procedure was reviewed in detail as well as the risks of bleeding, infection, DVT and potential surgical complications involving the bladder, ureters, colon or intestines.  Also the alternatives,  benefits, recovery and follow-up. Prevention of SSI discussed as indicated.  All of her questions were answered.    Discussed with her again the necessity for this to be done as an open procedure.  She is currently not having any bleeding but her last hemoglobin was 6.8.  Do recommend that we transfuse her 2 units presurgically of packed red blood cells.  She agrees to this.  Will arrange to have this done a day or 2 before her surgery.  Discussed surgical procedure with her in detail all of her questions were answered.    ~ S/P D&C HYSTEROSCOPY 06/26/2024 by Dr. Aguilera. Pathology results: \"ENDOMETRIAL CURETTINGS\":  BENIGN ENDOMETRIUM WITH STROMAL CHANGES CONSISTENT WITH PROGESTIN EFFECT.     ~ Hemoglobin on 7/8/24 was 8.5.     Exam:  Lungs CTAB  Heart RRR

## 2024-07-09 NOTE — TELEPHONE ENCOUNTER
Bess (Nurse  w/ [facility]) called stating she has not been able to get in touch w/ pt and that she will be closing her case.     Her contact info:   [phone] ext [phone]

## 2024-07-11 ENCOUNTER — OFFICE VISIT (OUTPATIENT)
Dept: OBGYN CLINIC | Age: 39
End: 2024-07-11
Payer: COMMERCIAL

## 2024-07-11 VITALS
WEIGHT: 236.3 LBS | HEIGHT: 63 IN | SYSTOLIC BLOOD PRESSURE: 158 MMHG | DIASTOLIC BLOOD PRESSURE: 90 MMHG | BODY MASS INDEX: 41.87 KG/M2

## 2024-07-11 DIAGNOSIS — Z01.818 PRE-OP EXAMINATION: Primary | ICD-10-CM

## 2024-07-11 PROCEDURE — 99214 OFFICE O/P EST MOD 30 MIN: CPT | Performed by: OBSTETRICS & GYNECOLOGY

## 2024-07-12 ENCOUNTER — TELEPHONE (OUTPATIENT)
Dept: INFUSION THERAPY | Age: 39
End: 2024-07-12

## 2024-07-12 ENCOUNTER — CLINICAL DOCUMENTATION (OUTPATIENT)
Dept: OBGYN CLINIC | Age: 39
End: 2024-07-12

## 2024-07-12 RX ORDER — SODIUM CHLORIDE 0.9 % (FLUSH) 0.9 %
5-40 SYRINGE (ML) INJECTION EVERY 12 HOURS SCHEDULED
Status: CANCELLED | OUTPATIENT
Start: 2024-07-12

## 2024-07-12 RX ORDER — SODIUM CHLORIDE 9 MG/ML
INJECTION, SOLUTION INTRAVENOUS PRN
Status: CANCELLED | OUTPATIENT
Start: 2024-07-12

## 2024-07-12 RX ORDER — SODIUM CHLORIDE 0.9 % (FLUSH) 0.9 %
5-40 SYRINGE (ML) INJECTION PRN
Status: CANCELLED | OUTPATIENT
Start: 2024-07-12

## 2024-07-12 NOTE — H&P (VIEW-ONLY)
been consented in the past.    Informed Consent for Blood Component Transfusion Note     I have discussed with the patient the rationale for blood component transfusion; its benefits in treating or preventing fatigue, organ damage, or death; and its risk which includes mild transfusion reactions, rare risk of blood borne infection, or more serious but rare reactions. I have discussed the alternatives to transfusion, including the risk and consequences of not receiving transfusion. The patient had an opportunity to ask questions and had agreed to proceed with transfusion of blood components.    Discussed the risks of surgery including the risks of bleeding, infection, deep vein thrombosis, and surgical injuries to internal organs including but not limited to the bowels, bladder, rectum, and female reproductive organs. The patient understands the risks; any and all questions were answered to the patient's satisfaction.

## 2024-07-12 NOTE — PROGRESS NOTES
I previously discussed with the patient at her last preop visit would like to transfuse her with 2 units of packed red blood cells preoperatively.  We will arrange to have this done.  Informed consent was also obtained at her visit from 7/11/2024.  Informed Consent for Blood Component Transfusion Note     I have discussed with the patient the rationale for blood component transfusion; its benefits in treating or preventing fatigue, organ damage, or death; and its risk which includes mild transfusion reactions, rare risk of blood borne infection, or more serious but rare reactions. I have discussed the alternatives to transfusion, including the risk and consequences of not receiving transfusion. The patient had an opportunity to ask questions and had agreed to proceed with transfusion of blood components.

## 2024-07-23 ENCOUNTER — HOSPITAL ENCOUNTER (OUTPATIENT)
Dept: LAB | Age: 39
Discharge: HOME | End: 2024-07-26
Payer: COMMERCIAL

## 2024-07-23 LAB — HISTORY CHECK: NORMAL

## 2024-07-23 PROCEDURE — 36415 COLL VENOUS BLD VENIPUNCTURE: CPT

## 2024-07-23 PROCEDURE — 86923 COMPATIBILITY TEST ELECTRIC: CPT

## 2024-07-23 PROCEDURE — 86900 BLOOD TYPING SEROLOGIC ABO: CPT

## 2024-07-23 PROCEDURE — 86850 RBC ANTIBODY SCREEN: CPT

## 2024-07-23 PROCEDURE — 86901 BLOOD TYPING SEROLOGIC RH(D): CPT

## 2024-07-24 ENCOUNTER — HOSPITAL ENCOUNTER (OUTPATIENT)
Dept: INFUSION THERAPY | Age: 39
Setting detail: INFUSION SERIES
Discharge: HOME | End: 2024-07-24
Payer: COMMERCIAL

## 2024-07-24 VITALS
SYSTOLIC BLOOD PRESSURE: 154 MMHG | DIASTOLIC BLOOD PRESSURE: 89 MMHG | OXYGEN SATURATION: 100 % | RESPIRATION RATE: 16 BRPM | HEART RATE: 75 BPM | TEMPERATURE: 97.6 F

## 2024-07-24 PROCEDURE — P9040 RBC LEUKOREDUCED IRRADIATED: HCPCS

## 2024-07-24 PROCEDURE — 36430 TRANSFUSION BLD/BLD COMPNT: CPT

## 2024-07-24 PROCEDURE — 2580000003 HC RX 258: Performed by: OBSTETRICS & GYNECOLOGY

## 2024-07-24 RX ORDER — SODIUM CHLORIDE 9 MG/ML
INJECTION, SOLUTION INTRAVENOUS ONCE
Status: DISCONTINUED | OUTPATIENT
Start: 2024-07-24 | End: 2024-07-25 | Stop reason: HOSPADM

## 2024-07-24 RX ORDER — SODIUM CHLORIDE 0.9 % (FLUSH) 0.9 %
5-40 SYRINGE (ML) INJECTION PRN
Status: ACTIVE | OUTPATIENT
Start: 2024-07-24 | End: 2024-07-24

## 2024-07-24 RX ADMIN — SODIUM CHLORIDE, PRESERVATIVE FREE 10 ML: 5 INJECTION INTRAVENOUS at 11:00

## 2024-07-24 NOTE — PROGRESS NOTES
Arrived to the Infusion Center. 2 units blood transfusion completed. Patient tolerated well.   Any issues or concerns during appointment: patient hypertensive on arrival to infusion (204/141), 170/73 on re-check. Message sent to ordering provider (Dr Steinberg). Per Dr Steinberg, no new orders ok to proceed with transfusion and will monitor patient and report changes in condition. Patient bp trended down during transfusion, bp 154/89 on discharge.   Patient instructed to call provider with temperature of 100.4 or greater or nausea/vomiting/ diarrhea or pain not controlled by medications  Discharged ambulatory.

## 2024-07-25 ENCOUNTER — ANESTHESIA EVENT (OUTPATIENT)
Dept: SURGERY | Age: 39
End: 2024-07-25
Payer: COMMERCIAL

## 2024-07-25 RX ORDER — FERROUS SULFATE 325(65) MG
325 TABLET ORAL NIGHTLY
COMMUNITY

## 2024-07-25 NOTE — PERIOP NOTE
Patient verified name and .  Order for consent found in EHR and matches case posting; patient verifies procedure.   Type 2 surgery, phone assessment complete.  Orders received.  Labs per surgeon: CBC, urine pregnancy---both ordered for DOS  Labs per anesthesia protocol: Hgb 8.5 on 24--patient received blood transfusion yesterday (24) and is taking a daily iron supplement--per surgeon's orders CBC DOS.   T&S DOS; order signed and held in EHR.     Patient answered medical/surgical history questions at their best of ability. All prior to admission medications documented in EPIC.    Patient instructed to continue taking all prescription medications up to the day of surgery but to take only the following medications the day of surgery according to anesthesia guidelines with a small sip of water: Provera.     Patient informed that all vitamins and supplements should be held 7 days prior to surgery and NSAIDS 5 days prior to surgery.     Patient instructed to continue daily iron supplement up until the DOS.     Patient instructed on the following:    > Arrive at [facility], time of arrival to be called the day before by 1700  > NPO after midnight, unless otherwise indicated, including gum, mints, and ice chips  > Responsible adult must drive patient to the hospital, stay during surgery, and patient will need supervision 24 hours after anesthesia  > Use non moisturizing soap in shower the night before surgery and on the morning of surgery  > All piercings must be removed prior to arrival.    > Leave all valuables (money and jewelry) at home but bring insurance card and ID on DOS.   > You may be required to pay a deductible or co-pay on the day of your procedure. You can pre-pay by calling [phone] if your surgery is at the [facility] or [phone] if your surgery is at the [facility].  > Do not wear make-up, nail polish, lotions, cologne, perfumes, powders, or oil on skin. Artificial nails are not

## 2024-07-26 ENCOUNTER — ANESTHESIA (OUTPATIENT)
Dept: SURGERY | Age: 39
End: 2024-07-26
Payer: COMMERCIAL

## 2024-07-26 ENCOUNTER — HOSPITAL ENCOUNTER (OUTPATIENT)
Age: 39
Setting detail: OBSERVATION
Discharge: HOME | End: 2024-07-28
Attending: OBSTETRICS & GYNECOLOGY | Admitting: OBSTETRICS & GYNECOLOGY
Payer: COMMERCIAL

## 2024-07-26 ENCOUNTER — PREP FOR PROCEDURE (OUTPATIENT)
Dept: OBGYN CLINIC | Age: 39
End: 2024-07-26

## 2024-07-26 DIAGNOSIS — N93.9 ABNORMAL UTERINE BLEEDING (AUB): ICD-10-CM

## 2024-07-26 DIAGNOSIS — D25.2 INTRAMURAL AND SUBSEROUS LEIOMYOMA OF UTERUS: Primary | ICD-10-CM

## 2024-07-26 DIAGNOSIS — D25.1 INTRAMURAL AND SUBSEROUS LEIOMYOMA OF UTERUS: Primary | ICD-10-CM

## 2024-07-26 PROBLEM — D25.9 FIBROID UTERUS: Status: ACTIVE | Noted: 2024-07-26

## 2024-07-26 LAB
ABO + RH BLD: NORMAL
BLOOD GROUP ANTIBODIES SERPL: NORMAL
ERYTHROCYTE [DISTWIDTH] IN BLOOD BY AUTOMATED COUNT: 19.7 % (ref 11.9–14.6)
HCG UR QL: NEGATIVE
HCT VFR BLD AUTO: 39.1 % (ref 35.8–46.3)
HGB BLD-MCNC: 11.4 G/DL (ref 11.7–15.4)
MCH RBC QN AUTO: 22.1 PG (ref 26.1–32.9)
MCHC RBC AUTO-ENTMCNC: 29.2 G/DL (ref 31.4–35)
MCV RBC AUTO: 75.9 FL (ref 82–102)
NRBC # BLD: 0 K/UL (ref 0–0.2)
PLATELET # BLD AUTO: 399 K/UL (ref 150–450)
PMV BLD AUTO: 11.1 FL (ref 9.4–12.3)
RBC # BLD AUTO: 5.15 M/UL (ref 4.05–5.2)
SPECIMEN EXP DATE BLD: NORMAL
WBC # BLD AUTO: 6.6 K/UL (ref 4.3–11.1)

## 2024-07-26 PROCEDURE — 6360000002 HC RX W HCPCS: Performed by: ANESTHESIOLOGY

## 2024-07-26 PROCEDURE — 94760 N-INVAS EAR/PLS OXIMETRY 1: CPT

## 2024-07-26 PROCEDURE — 2700000000 HC OXYGEN THERAPY PER DAY

## 2024-07-26 PROCEDURE — 6360000002 HC RX W HCPCS: Performed by: NURSE ANESTHETIST, CERTIFIED REGISTERED

## 2024-07-26 PROCEDURE — 6370000000 HC RX 637 (ALT 250 FOR IP): Performed by: ANESTHESIOLOGY

## 2024-07-26 PROCEDURE — 2580000003 HC RX 258: Performed by: NURSE ANESTHETIST, CERTIFIED REGISTERED

## 2024-07-26 PROCEDURE — 2709999900 HC NON-CHARGEABLE SUPPLY: Performed by: OBSTETRICS & GYNECOLOGY

## 2024-07-26 PROCEDURE — 64486 TAP BLOCK UNIL BY INJECTION: CPT | Performed by: ANESTHESIOLOGY

## 2024-07-26 PROCEDURE — 1100000000 HC RM PRIVATE

## 2024-07-26 PROCEDURE — 2580000003 HC RX 258: Performed by: OBSTETRICS & GYNECOLOGY

## 2024-07-26 PROCEDURE — 85027 COMPLETE CBC AUTOMATED: CPT

## 2024-07-26 PROCEDURE — 3600000003 HC SURGERY LEVEL 3 BASE: Performed by: OBSTETRICS & GYNECOLOGY

## 2024-07-26 PROCEDURE — 86901 BLOOD TYPING SEROLOGIC RH(D): CPT

## 2024-07-26 PROCEDURE — 86900 BLOOD TYPING SEROLOGIC ABO: CPT

## 2024-07-26 PROCEDURE — C1765 ADHESION BARRIER: HCPCS | Performed by: OBSTETRICS & GYNECOLOGY

## 2024-07-26 PROCEDURE — 6360000002 HC RX W HCPCS: Performed by: OBSTETRICS & GYNECOLOGY

## 2024-07-26 PROCEDURE — 3600000013 HC SURGERY LEVEL 3 ADDTL 15MIN: Performed by: OBSTETRICS & GYNECOLOGY

## 2024-07-26 PROCEDURE — 7100000001 HC PACU RECOVERY - ADDTL 15 MIN: Performed by: OBSTETRICS & GYNECOLOGY

## 2024-07-26 PROCEDURE — 81025 URINE PREGNANCY TEST: CPT

## 2024-07-26 PROCEDURE — 6370000000 HC RX 637 (ALT 250 FOR IP): Performed by: OBSTETRICS & GYNECOLOGY

## 2024-07-26 PROCEDURE — 7100000000 HC PACU RECOVERY - FIRST 15 MIN: Performed by: OBSTETRICS & GYNECOLOGY

## 2024-07-26 PROCEDURE — 58140 MYOMECTOMY ABDOM METHOD: CPT | Performed by: OBSTETRICS & GYNECOLOGY

## 2024-07-26 PROCEDURE — 2500000003 HC RX 250 WO HCPCS: Performed by: NURSE ANESTHETIST, CERTIFIED REGISTERED

## 2024-07-26 PROCEDURE — 3700000000 HC ANESTHESIA ATTENDED CARE: Performed by: OBSTETRICS & GYNECOLOGY

## 2024-07-26 PROCEDURE — 88305 TISSUE EXAM BY PATHOLOGIST: CPT

## 2024-07-26 PROCEDURE — 86850 RBC ANTIBODY SCREEN: CPT

## 2024-07-26 PROCEDURE — 58140 MYOMECTOMY ABDOM METHOD: CPT | Performed by: STUDENT IN AN ORGANIZED HEALTH CARE EDUCATION/TRAINING PROGRAM

## 2024-07-26 PROCEDURE — 3700000001 HC ADD 15 MINUTES (ANESTHESIA): Performed by: OBSTETRICS & GYNECOLOGY

## 2024-07-26 PROCEDURE — 2580000003 HC RX 258: Performed by: ANESTHESIOLOGY

## 2024-07-26 RX ORDER — MIDAZOLAM HYDROCHLORIDE 2 MG/2ML
2 INJECTION, SOLUTION INTRAMUSCULAR; INTRAVENOUS
Status: DISCONTINUED | OUTPATIENT
Start: 2024-07-26 | End: 2024-07-26 | Stop reason: HOSPADM

## 2024-07-26 RX ORDER — DIPHENHYDRAMINE HYDROCHLORIDE 50 MG/ML
12.5 INJECTION INTRAMUSCULAR; INTRAVENOUS
Status: DISCONTINUED | OUTPATIENT
Start: 2024-07-26 | End: 2024-07-26

## 2024-07-26 RX ORDER — KETOROLAC TROMETHAMINE 30 MG/ML
30 INJECTION, SOLUTION INTRAMUSCULAR; INTRAVENOUS EVERY 6 HOURS
Status: COMPLETED | OUTPATIENT
Start: 2024-07-26 | End: 2024-07-27

## 2024-07-26 RX ORDER — ACETAMINOPHEN 500 MG
1000 TABLET ORAL ONCE
Status: COMPLETED | OUTPATIENT
Start: 2024-07-26 | End: 2024-07-26

## 2024-07-26 RX ORDER — MORPHINE SULFATE 2 MG/ML
2 INJECTION, SOLUTION INTRAMUSCULAR; INTRAVENOUS
Status: DISCONTINUED | OUTPATIENT
Start: 2024-07-26 | End: 2024-07-27

## 2024-07-26 RX ORDER — MIDAZOLAM HYDROCHLORIDE 1 MG/ML
INJECTION INTRAMUSCULAR; INTRAVENOUS PRN
Status: DISCONTINUED | OUTPATIENT
Start: 2024-07-26 | End: 2024-07-26 | Stop reason: SDUPTHER

## 2024-07-26 RX ORDER — SODIUM CHLORIDE 9 MG/ML
INJECTION, SOLUTION INTRAVENOUS PRN
Status: DISCONTINUED | OUTPATIENT
Start: 2024-07-26 | End: 2024-07-26

## 2024-07-26 RX ORDER — ONDANSETRON 2 MG/ML
INJECTION INTRAMUSCULAR; INTRAVENOUS PRN
Status: DISCONTINUED | OUTPATIENT
Start: 2024-07-26 | End: 2024-07-26 | Stop reason: SDUPTHER

## 2024-07-26 RX ORDER — LIDOCAINE HYDROCHLORIDE 10 MG/ML
1 INJECTION, SOLUTION INFILTRATION; PERINEURAL
Status: DISCONTINUED | OUTPATIENT
Start: 2024-07-26 | End: 2024-07-26 | Stop reason: HOSPADM

## 2024-07-26 RX ORDER — SODIUM CHLORIDE 9 MG/ML
INJECTION, SOLUTION INTRAVENOUS PRN
Status: DISCONTINUED | OUTPATIENT
Start: 2024-07-26 | End: 2024-07-26 | Stop reason: HOSPADM

## 2024-07-26 RX ORDER — NEOSTIGMINE METHYLSULFATE 1 MG/ML
INJECTION, SOLUTION INTRAVENOUS PRN
Status: DISCONTINUED | OUTPATIENT
Start: 2024-07-26 | End: 2024-07-26 | Stop reason: SDUPTHER

## 2024-07-26 RX ORDER — KETAMINE HYDROCHLORIDE 50 MG/ML
INJECTION, SOLUTION INTRAMUSCULAR; INTRAVENOUS PRN
Status: DISCONTINUED | OUTPATIENT
Start: 2024-07-26 | End: 2024-07-26 | Stop reason: SDUPTHER

## 2024-07-26 RX ORDER — ACETAMINOPHEN 500 MG
1000 TABLET ORAL EVERY 8 HOURS SCHEDULED
Status: DISCONTINUED | OUTPATIENT
Start: 2024-07-26 | End: 2024-07-28 | Stop reason: HOSPADM

## 2024-07-26 RX ORDER — KETOROLAC TROMETHAMINE 30 MG/ML
INJECTION, SOLUTION INTRAMUSCULAR; INTRAVENOUS PRN
Status: DISCONTINUED | OUTPATIENT
Start: 2024-07-26 | End: 2024-07-26 | Stop reason: SDUPTHER

## 2024-07-26 RX ORDER — IBUPROFEN 800 MG/1
800 TABLET ORAL EVERY 8 HOURS
Status: DISCONTINUED | OUTPATIENT
Start: 2024-07-27 | End: 2024-07-28 | Stop reason: HOSPADM

## 2024-07-26 RX ORDER — SODIUM CHLORIDE 0.9 % (FLUSH) 0.9 %
5-40 SYRINGE (ML) INJECTION EVERY 12 HOURS SCHEDULED
Status: DISCONTINUED | OUTPATIENT
Start: 2024-07-26 | End: 2024-07-26 | Stop reason: HOSPADM

## 2024-07-26 RX ORDER — LIDOCAINE HYDROCHLORIDE ANHYDROUS AND DEXTROSE MONOHYDRATE 5; 400 G/100ML; MG/100ML
INJECTION, SOLUTION INTRAVENOUS CONTINUOUS PRN
Status: DISCONTINUED | OUTPATIENT
Start: 2024-07-26 | End: 2024-07-26

## 2024-07-26 RX ORDER — SODIUM CHLORIDE 0.9 % (FLUSH) 0.9 %
5-40 SYRINGE (ML) INJECTION PRN
Status: DISCONTINUED | OUTPATIENT
Start: 2024-07-26 | End: 2024-07-26

## 2024-07-26 RX ORDER — DEXAMETHASONE SODIUM PHOSPHATE 10 MG/ML
INJECTION, SOLUTION INTRAMUSCULAR; INTRAVENOUS
Status: COMPLETED | OUTPATIENT
Start: 2024-07-26 | End: 2024-07-26

## 2024-07-26 RX ORDER — LIDOCAINE HYDROCHLORIDE 20 MG/ML
INJECTION, SOLUTION EPIDURAL; INFILTRATION; INTRACAUDAL; PERINEURAL PRN
Status: DISCONTINUED | OUTPATIENT
Start: 2024-07-26 | End: 2024-07-26 | Stop reason: SDUPTHER

## 2024-07-26 RX ORDER — HALOPERIDOL 5 MG/ML
1 INJECTION INTRAMUSCULAR
Status: DISCONTINUED | OUTPATIENT
Start: 2024-07-26 | End: 2024-07-26

## 2024-07-26 RX ORDER — TRANEXAMIC ACID 100 MG/ML
INJECTION, SOLUTION INTRAVENOUS PRN
Status: DISCONTINUED | OUTPATIENT
Start: 2024-07-26 | End: 2024-07-26 | Stop reason: SDUPTHER

## 2024-07-26 RX ORDER — SODIUM CHLORIDE, SODIUM LACTATE, POTASSIUM CHLORIDE, CALCIUM CHLORIDE 600; 310; 30; 20 MG/100ML; MG/100ML; MG/100ML; MG/100ML
INJECTION, SOLUTION INTRAVENOUS CONTINUOUS PRN
Status: DISCONTINUED | OUTPATIENT
Start: 2024-07-26 | End: 2024-07-26 | Stop reason: SDUPTHER

## 2024-07-26 RX ORDER — GLYCOPYRROLATE 0.2 MG/ML
INJECTION INTRAMUSCULAR; INTRAVENOUS PRN
Status: DISCONTINUED | OUTPATIENT
Start: 2024-07-26 | End: 2024-07-26 | Stop reason: SDUPTHER

## 2024-07-26 RX ORDER — DEXAMETHASONE SODIUM PHOSPHATE 10 MG/ML
INJECTION INTRAMUSCULAR; INTRAVENOUS PRN
Status: DISCONTINUED | OUTPATIENT
Start: 2024-07-26 | End: 2024-07-26 | Stop reason: SDUPTHER

## 2024-07-26 RX ORDER — OXYCODONE HYDROCHLORIDE 5 MG/1
10 TABLET ORAL EVERY 4 HOURS PRN
Status: DISCONTINUED | OUTPATIENT
Start: 2024-07-26 | End: 2024-07-28 | Stop reason: HOSPADM

## 2024-07-26 RX ORDER — OXYCODONE HYDROCHLORIDE 5 MG/1
5 TABLET ORAL
Status: COMPLETED | OUTPATIENT
Start: 2024-07-26 | End: 2024-07-26

## 2024-07-26 RX ORDER — MORPHINE SULFATE 4 MG/ML
4 INJECTION, SOLUTION INTRAMUSCULAR; INTRAVENOUS
Status: DISCONTINUED | OUTPATIENT
Start: 2024-07-26 | End: 2024-07-27

## 2024-07-26 RX ORDER — HYDROMORPHONE HYDROCHLORIDE 2 MG/ML
INJECTION, SOLUTION INTRAMUSCULAR; INTRAVENOUS; SUBCUTANEOUS PRN
Status: DISCONTINUED | OUTPATIENT
Start: 2024-07-26 | End: 2024-07-26 | Stop reason: SDUPTHER

## 2024-07-26 RX ORDER — OXYCODONE HYDROCHLORIDE 5 MG/1
5 TABLET ORAL EVERY 4 HOURS PRN
Status: DISCONTINUED | OUTPATIENT
Start: 2024-07-26 | End: 2024-07-28 | Stop reason: HOSPADM

## 2024-07-26 RX ORDER — SODIUM CHLORIDE 0.9 % (FLUSH) 0.9 %
5-40 SYRINGE (ML) INJECTION EVERY 12 HOURS SCHEDULED
Status: DISCONTINUED | OUTPATIENT
Start: 2024-07-26 | End: 2024-07-26

## 2024-07-26 RX ORDER — LIDOCAINE HYDROCHLORIDE ANHYDROUS AND DEXTROSE MONOHYDRATE 5; 400 G/100ML; MG/100ML
INJECTION, SOLUTION INTRAVENOUS CONTINUOUS PRN
Status: DISCONTINUED | OUTPATIENT
Start: 2024-07-26 | End: 2024-07-26 | Stop reason: SDUPTHER

## 2024-07-26 RX ORDER — NALOXONE HYDROCHLORIDE 0.4 MG/ML
INJECTION, SOLUTION INTRAMUSCULAR; INTRAVENOUS; SUBCUTANEOUS PRN
Status: DISCONTINUED | OUTPATIENT
Start: 2024-07-26 | End: 2024-07-26

## 2024-07-26 RX ORDER — FENTANYL CITRATE 50 UG/ML
INJECTION, SOLUTION INTRAMUSCULAR; INTRAVENOUS PRN
Status: DISCONTINUED | OUTPATIENT
Start: 2024-07-26 | End: 2024-07-26 | Stop reason: SDUPTHER

## 2024-07-26 RX ORDER — SODIUM CHLORIDE 0.9 % (FLUSH) 0.9 %
5-40 SYRINGE (ML) INJECTION EVERY 12 HOURS SCHEDULED
Status: DISCONTINUED | OUTPATIENT
Start: 2024-07-26 | End: 2024-07-28 | Stop reason: HOSPADM

## 2024-07-26 RX ORDER — EPHEDRINE SULFATE/0.9% NACL/PF 50 MG/5 ML
SYRINGE (ML) INTRAVENOUS PRN
Status: DISCONTINUED | OUTPATIENT
Start: 2024-07-26 | End: 2024-07-26 | Stop reason: SDUPTHER

## 2024-07-26 RX ORDER — IBUPROFEN 600 MG/1
1 TABLET ORAL PRN
Status: DISCONTINUED | OUTPATIENT
Start: 2024-07-26 | End: 2024-07-26

## 2024-07-26 RX ORDER — ROCURONIUM BROMIDE 10 MG/ML
INJECTION, SOLUTION INTRAVENOUS PRN
Status: DISCONTINUED | OUTPATIENT
Start: 2024-07-26 | End: 2024-07-26 | Stop reason: SDUPTHER

## 2024-07-26 RX ORDER — PROMETHAZINE HYDROCHLORIDE 12.5 MG/1
12.5 TABLET ORAL EVERY 6 HOURS PRN
Status: DISCONTINUED | OUTPATIENT
Start: 2024-07-26 | End: 2024-07-28 | Stop reason: HOSPADM

## 2024-07-26 RX ORDER — SODIUM CHLORIDE 0.9 % (FLUSH) 0.9 %
5-40 SYRINGE (ML) INJECTION PRN
Status: DISCONTINUED | OUTPATIENT
Start: 2024-07-26 | End: 2024-07-26 | Stop reason: HOSPADM

## 2024-07-26 RX ORDER — DEXTROSE MONOHYDRATE 100 MG/ML
INJECTION, SOLUTION INTRAVENOUS CONTINUOUS PRN
Status: DISCONTINUED | OUTPATIENT
Start: 2024-07-26 | End: 2024-07-26

## 2024-07-26 RX ORDER — PROPOFOL 10 MG/ML
INJECTION, EMULSION INTRAVENOUS PRN
Status: DISCONTINUED | OUTPATIENT
Start: 2024-07-26 | End: 2024-07-26 | Stop reason: SDUPTHER

## 2024-07-26 RX ORDER — SODIUM CHLORIDE, SODIUM LACTATE, POTASSIUM CHLORIDE, CALCIUM CHLORIDE 600; 310; 30; 20 MG/100ML; MG/100ML; MG/100ML; MG/100ML
INJECTION, SOLUTION INTRAVENOUS CONTINUOUS
Status: DISCONTINUED | OUTPATIENT
Start: 2024-07-26 | End: 2024-07-26

## 2024-07-26 RX ORDER — SODIUM CHLORIDE 0.9 % (FLUSH) 0.9 %
5-40 SYRINGE (ML) INJECTION PRN
Status: DISCONTINUED | OUTPATIENT
Start: 2024-07-26 | End: 2024-07-28 | Stop reason: HOSPADM

## 2024-07-26 RX ORDER — DEXTROSE, SODIUM CHLORIDE, SODIUM LACTATE, POTASSIUM CHLORIDE, AND CALCIUM CHLORIDE 5; .6; .31; .03; .02 G/100ML; G/100ML; G/100ML; G/100ML; G/100ML
INJECTION, SOLUTION INTRAVENOUS CONTINUOUS
Status: DISCONTINUED | OUTPATIENT
Start: 2024-07-26 | End: 2024-07-27

## 2024-07-26 RX ORDER — PROCHLORPERAZINE EDISYLATE 5 MG/ML
5 INJECTION INTRAMUSCULAR; INTRAVENOUS
Status: COMPLETED | OUTPATIENT
Start: 2024-07-26 | End: 2024-07-26

## 2024-07-26 RX ORDER — SODIUM CHLORIDE 9 MG/ML
INJECTION, SOLUTION INTRAVENOUS PRN
Status: DISCONTINUED | OUTPATIENT
Start: 2024-07-26 | End: 2024-07-28 | Stop reason: HOSPADM

## 2024-07-26 RX ORDER — SODIUM CHLORIDE, SODIUM LACTATE, POTASSIUM CHLORIDE, CALCIUM CHLORIDE 600; 310; 30; 20 MG/100ML; MG/100ML; MG/100ML; MG/100ML
INJECTION, SOLUTION INTRAVENOUS CONTINUOUS
Status: DISCONTINUED | OUTPATIENT
Start: 2024-07-26 | End: 2024-07-26 | Stop reason: HOSPADM

## 2024-07-26 RX ORDER — HYDRALAZINE HYDROCHLORIDE 20 MG/ML
10 INJECTION INTRAMUSCULAR; INTRAVENOUS ONCE
Status: COMPLETED | OUTPATIENT
Start: 2024-07-26 | End: 2024-07-26

## 2024-07-26 RX ORDER — ONDANSETRON 2 MG/ML
4 INJECTION INTRAMUSCULAR; INTRAVENOUS EVERY 6 HOURS PRN
Status: DISCONTINUED | OUTPATIENT
Start: 2024-07-26 | End: 2024-07-28 | Stop reason: HOSPADM

## 2024-07-26 RX ADMIN — ROPIVACAINE HYDROCHLORIDE 20 ML: 5 INJECTION, SOLUTION EPIDURAL; INFILTRATION; PERINEURAL at 07:20

## 2024-07-26 RX ADMIN — KETAMINE HYDROCHLORIDE 20 MG: 50 INJECTION, SOLUTION INTRAMUSCULAR; INTRAVENOUS at 07:18

## 2024-07-26 RX ADMIN — FENTANYL CITRATE 50 MCG: 50 INJECTION, SOLUTION INTRAMUSCULAR; INTRAVENOUS at 07:06

## 2024-07-26 RX ADMIN — DEXAMETHASONE SODIUM PHOSPHATE 4 MG: 10 INJECTION, SOLUTION INTRAMUSCULAR; INTRAVENOUS at 07:19

## 2024-07-26 RX ADMIN — DEXAMETHASONE SODIUM PHOSPHATE 4 MG: 10 INJECTION, SOLUTION INTRAMUSCULAR; INTRAVENOUS at 07:20

## 2024-07-26 RX ADMIN — PROPOFOL 200 MG: 10 INJECTION, EMULSION INTRAVENOUS at 07:12

## 2024-07-26 RX ADMIN — KETOROLAC TROMETHAMINE 30 MG: 30 INJECTION, SOLUTION INTRAMUSCULAR at 22:50

## 2024-07-26 RX ADMIN — SODIUM CHLORIDE, SODIUM LACTATE, POTASSIUM CHLORIDE, AND CALCIUM CHLORIDE: 600; 310; 30; 20 INJECTION, SOLUTION INTRAVENOUS at 08:04

## 2024-07-26 RX ADMIN — MORPHINE SULFATE 4 MG: 4 INJECTION INTRAVENOUS at 18:19

## 2024-07-26 RX ADMIN — SODIUM CHLORIDE, SODIUM LACTATE, POTASSIUM CHLORIDE, AND CALCIUM CHLORIDE: 600; 310; 30; 20 INJECTION, SOLUTION INTRAVENOUS at 07:12

## 2024-07-26 RX ADMIN — HYDROMORPHONE HYDROCHLORIDE 0.5 MG: 1 INJECTION, SOLUTION INTRAMUSCULAR; INTRAVENOUS; SUBCUTANEOUS at 09:48

## 2024-07-26 RX ADMIN — LIDOCAINE HYDROCHLORIDE 1 MG/KG/HR: 4 INJECTION, SOLUTION INTRAVENOUS at 07:08

## 2024-07-26 RX ADMIN — HYDROMORPHONE HYDROCHLORIDE 0.5 MG: 1 INJECTION, SOLUTION INTRAMUSCULAR; INTRAVENOUS; SUBCUTANEOUS at 09:37

## 2024-07-26 RX ADMIN — ROCURONIUM BROMIDE 10 MG: 10 INJECTION, SOLUTION INTRAVENOUS at 08:35

## 2024-07-26 RX ADMIN — ONDANSETRON 4 MG: 2 INJECTION INTRAMUSCULAR; INTRAVENOUS at 08:47

## 2024-07-26 RX ADMIN — MIDAZOLAM 2 MG: 1 INJECTION INTRAMUSCULAR; INTRAVENOUS at 07:01

## 2024-07-26 RX ADMIN — PHENYLEPHRINE HYDROCHLORIDE 100 MCG: 0.1 INJECTION, SOLUTION INTRAVENOUS at 07:35

## 2024-07-26 RX ADMIN — SODIUM CHLORIDE, SODIUM LACTATE, POTASSIUM CHLORIDE, CALCIUM CHLORIDE AND DEXTROSE MONOHYDRATE: 5; 600; 310; 30; 20 INJECTION, SOLUTION INTRAVENOUS at 12:58

## 2024-07-26 RX ADMIN — ACETAMINOPHEN 1000 MG: 500 TABLET, FILM COATED ORAL at 13:56

## 2024-07-26 RX ADMIN — LIDOCAINE HYDROCHLORIDE 100 MG: 20 INJECTION, SOLUTION EPIDURAL; INFILTRATION; INTRACAUDAL; PERINEURAL at 07:12

## 2024-07-26 RX ADMIN — Medication 4 MG: at 08:54

## 2024-07-26 RX ADMIN — PROCHLORPERAZINE EDISYLATE 5 MG: 5 INJECTION INTRAMUSCULAR; INTRAVENOUS at 11:09

## 2024-07-26 RX ADMIN — ROCURONIUM BROMIDE 50 MG: 10 INJECTION, SOLUTION INTRAVENOUS at 07:12

## 2024-07-26 RX ADMIN — ACETAMINOPHEN 1000 MG: 500 TABLET, FILM COATED ORAL at 21:22

## 2024-07-26 RX ADMIN — GLYCOPYRROLATE 0.2 MG: 0.2 INJECTION INTRAMUSCULAR; INTRAVENOUS at 07:47

## 2024-07-26 RX ADMIN — OXYCODONE 5 MG: 5 TABLET ORAL at 11:09

## 2024-07-26 RX ADMIN — GLYCOPYRROLATE 0.4 MG: 0.2 INJECTION INTRAMUSCULAR; INTRAVENOUS at 08:54

## 2024-07-26 RX ADMIN — HYDROMORPHONE HYDROCHLORIDE 0.4 MG: 2 INJECTION INTRAMUSCULAR; INTRAVENOUS; SUBCUTANEOUS at 07:57

## 2024-07-26 RX ADMIN — ACETAMINOPHEN 1000 MG: 500 TABLET, FILM COATED ORAL at 06:03

## 2024-07-26 RX ADMIN — SODIUM CHLORIDE, SODIUM LACTATE, POTASSIUM CHLORIDE, CALCIUM CHLORIDE AND DEXTROSE MONOHYDRATE: 5; 600; 310; 30; 20 INJECTION, SOLUTION INTRAVENOUS at 21:25

## 2024-07-26 RX ADMIN — HYDROMORPHONE HYDROCHLORIDE 0.5 MG: 1 INJECTION, SOLUTION INTRAMUSCULAR; INTRAVENOUS; SUBCUTANEOUS at 09:32

## 2024-07-26 RX ADMIN — ROPIVACAINE HYDROCHLORIDE 20 ML: 5 INJECTION, SOLUTION EPIDURAL; INFILTRATION; PERINEURAL at 07:19

## 2024-07-26 RX ADMIN — ONDANSETRON 4 MG: 2 INJECTION INTRAMUSCULAR; INTRAVENOUS at 07:30

## 2024-07-26 RX ADMIN — SODIUM CHLORIDE 150 MG: 9 INJECTION, SOLUTION INTRAVENOUS at 06:21

## 2024-07-26 RX ADMIN — Medication 10 MG: at 07:48

## 2024-07-26 RX ADMIN — DEXAMETHASONE SODIUM PHOSPHATE 10 MG: 10 INJECTION INTRAMUSCULAR; INTRAVENOUS at 07:19

## 2024-07-26 RX ADMIN — KETAMINE HYDROCHLORIDE 10 MG: 50 INJECTION, SOLUTION INTRAMUSCULAR; INTRAVENOUS at 07:39

## 2024-07-26 RX ADMIN — KETAMINE HYDROCHLORIDE 10 MG: 50 INJECTION, SOLUTION INTRAMUSCULAR; INTRAVENOUS at 07:47

## 2024-07-26 RX ADMIN — Medication 2000 MG: at 07:30

## 2024-07-26 RX ADMIN — ROCURONIUM BROMIDE 10 MG: 10 INJECTION, SOLUTION INTRAVENOUS at 08:00

## 2024-07-26 RX ADMIN — HYDROMORPHONE HYDROCHLORIDE 0.4 MG: 2 INJECTION INTRAMUSCULAR; INTRAVENOUS; SUBCUTANEOUS at 07:56

## 2024-07-26 RX ADMIN — TRANEXAMIC ACID 1000 MG: 100 INJECTION, SOLUTION INTRAVENOUS at 07:40

## 2024-07-26 RX ADMIN — HYDROMORPHONE HYDROCHLORIDE 0.5 MG: 1 INJECTION, SOLUTION INTRAMUSCULAR; INTRAVENOUS; SUBCUTANEOUS at 09:42

## 2024-07-26 RX ADMIN — KETOROLAC TROMETHAMINE 30 MG: 30 INJECTION, SOLUTION INTRAMUSCULAR at 17:06

## 2024-07-26 RX ADMIN — KETOROLAC TROMETHAMINE 30 MG: 30 INJECTION, SOLUTION INTRAMUSCULAR; INTRAVENOUS at 08:47

## 2024-07-26 RX ADMIN — HYDRALAZINE HYDROCHLORIDE 10 MG: 20 INJECTION INTRAMUSCULAR; INTRAVENOUS at 09:19

## 2024-07-26 RX ADMIN — FENTANYL CITRATE 50 MCG: 50 INJECTION, SOLUTION INTRAMUSCULAR; INTRAVENOUS at 07:09

## 2024-07-26 RX ADMIN — HYDROMORPHONE HYDROCHLORIDE 0.4 MG: 2 INJECTION INTRAMUSCULAR; INTRAVENOUS; SUBCUTANEOUS at 08:02

## 2024-07-26 ASSESSMENT — PAIN DESCRIPTION - ORIENTATION
ORIENTATION: LOWER
ORIENTATION: MID;LOWER
ORIENTATION: LOWER

## 2024-07-26 ASSESSMENT — PAIN DESCRIPTION - ONSET
ONSET: GRADUAL
ONSET: GRADUAL

## 2024-07-26 ASSESSMENT — PAIN DESCRIPTION - DESCRIPTORS
DESCRIPTORS: PATIENT UNABLE TO DESCRIBE
DESCRIPTORS: ACHING
DESCRIPTORS: PATIENT UNABLE TO DESCRIBE

## 2024-07-26 ASSESSMENT — PAIN SCALES - GENERAL
PAINLEVEL_OUTOF10: 7
PAINLEVEL_OUTOF10: 6
PAINLEVEL_OUTOF10: 7
PAINLEVEL_OUTOF10: 3
PAINLEVEL_OUTOF10: 9
PAINLEVEL_OUTOF10: 7
PAINLEVEL_OUTOF10: 7
PAINLEVEL_OUTOF10: 4
PAINLEVEL_OUTOF10: 4

## 2024-07-26 ASSESSMENT — PAIN DESCRIPTION - PAIN TYPE
TYPE: SURGICAL PAIN
TYPE: SURGICAL PAIN

## 2024-07-26 ASSESSMENT — PAIN DESCRIPTION - LOCATION
LOCATION: ABDOMEN

## 2024-07-26 ASSESSMENT — PAIN DESCRIPTION - FREQUENCY
FREQUENCY: INTERMITTENT
FREQUENCY: INTERMITTENT

## 2024-07-26 ASSESSMENT — PAIN - FUNCTIONAL ASSESSMENT: PAIN_FUNCTIONAL_ASSESSMENT: 0-10

## 2024-07-26 NOTE — ANESTHESIA PROCEDURE NOTES
Peripheral Block    Patient location during procedure: OR  Reason for block: post-op pain management and at surgeon's request  Start time: 7/26/2024 7:20 AM  End time: 7/26/2024 7:21 AM  Staffing  Performed: anesthesiologist   Anesthesiologist: Mignon Mccarty MD  Performed by: Mignon Mccarty MD  Authorized by: Mignon Mccarty MD    Preanesthetic Checklist  Completed: patient identified, IV checked, site marked, risks and benefits discussed, surgical/procedural consents, equipment checked, pre-op evaluation, timeout performed, anesthesia consent given, oxygen available and monitors applied/VS acknowledged  Peripheral Block   Patient position: supine  Prep: ChloraPrep  Provider prep: sterile gloves and mask  Patient monitoring: cardiac monitor, continuous pulse ox, frequent blood pressure checks, IV access, oxygen and responsive to questions  Block type: TAP  Laterality: right  Injection technique: single-shot  Guidance: ultrasound guided    Needle   Needle type: insulated echogenic nerve stimulator needle   Needle gauge: 20 G  Needle localization: ultrasound guidance  Needle length: 10 cm  Assessment   Injection assessment: negative aspiration for heme, no paresthesia on injection, local visualized surrounding nerve on ultrasound and no intravascular symptoms  Paresthesia pain: none  Slow fractionated injection: yes  Hemodynamics: stable  Outcomes: uncomplicated and patient tolerated procedure well    Additional Notes  Ultrasound image taken and stored in chart  Medications Administered  dexAMETHasone (DECADRON) (PF) 10 mg/mL injection - Other   4 mg - 7/26/2024 7:20:00 AM  ROPivacaine 0.5% with EPINEPHrine 1:[id number] injection (ANESTHESIA USE ONLY) (Mixture components: EPINEPHrine PF 1 MG/ML Soln, 0.005 mL; ROPivacaine 0.5% Soln, 1 mL) - Perineural   20 mL - 7/26/2024 7:20:00 AM

## 2024-07-26 NOTE — ACP (ADVANCE CARE PLANNING)
Advance Care Planning     General Advance Care Planning (ACP) Conversation    Date of Conversation: 7/3/2024  Conducted with: Patient with Decision Making Capacity  Other persons present: Spouse Penelope oFss    Healthcare Decision Maker:  No healthcare decision makers have been documented.  Click here to complete HealthCare Decision Makers including selection of the Healthcare Decision Maker Relationship (ie \"Primary\")  Today we documented Decision Maker(s) consistent with Legal Next of Kin hierarchy.    Length of Voluntary ACP Conversation in minutes:  <16 minutes (Non-Billable)    Emanuel Mercado RN

## 2024-07-26 NOTE — INTERVAL H&P NOTE
Update History & Physical    The patient's History and Physical of July 12, 2024 was reviewed with the patient and I examined the patient. There was no change. The surgical site was confirmed by the patient and me.     Plan: The risks, benefits, expected outcome, and alternative to the recommended procedure have been discussed with the patient. Patient understands and wants to proceed with the procedure.     Electronically signed by Josh Steinberg MD on 7/26/2024 at 6:49 AM

## 2024-07-26 NOTE — ANESTHESIA POSTPROCEDURE EVALUATION
Department of Anesthesiology  Postprocedure Note    Patient: Diana Foss  MRN: [id number]  YOB: 1985  Date of evaluation: 7/26/2024    Procedure Summary       Date: 07/26/24 [place] [facility] [place] [facility] [place]    Anesthesia Start: 0655 Anesthesia Stop: 0916    Procedure: OPEN UTERINE MYOMECTOMY (Pelvis) Diagnosis:       Abnormal uterine bleeding (AUB)      Fibroids      (Abnormal uterine bleeding (AUB) [N93.9])      (Fibroids [D21.9])    Surgeons: Josh Steinberg MD Responsible Provider: Romie Bains MD    Anesthesia Type: General ASA Status: 3            Anesthesia Type: General    Dany Phase I: Dany Score: 8    Dany Phase II:      Anesthesia Post Evaluation    Patient location during evaluation: PACU  Patient participation: complete - patient participated  Level of consciousness: awake and alert  Airway patency: patent  Nausea & Vomiting: no nausea and no vomiting  Cardiovascular status: hemodynamically stable  Respiratory status: acceptable, nonlabored ventilation and spontaneous ventilation  Hydration status: euvolemic  Comments: /84   Pulse 99   Temp 98.1 °F (36.7 °C)   Resp 20   Ht 1.6 m (5' 2.99\")   Wt 107.8 kg (237 lb 9.6 oz)   SpO2 96%   BMI 42.10 kg/m²     Multimodal analgesia pain management approach  Pain management: adequate and satisfactory to patient    No notable events documented.

## 2024-07-26 NOTE — PROGRESS NOTES
Gynecology Progress Note    Patient doing well post-op day 0 from Myomectomy without significant complaints.  Pain controlled on current medication.   Good UOP in johnson    Vitals:  Blood pressure (!) 134/90, pulse 91, temperature 98.5 °F (36.9 °C), temperature source Oral, resp. rate 12, height 1.6 m (5' 2.99\"), weight 107.8 kg (237 lb 9.6 oz), SpO2 97 %.  Temp (24hrs), Av °F (36.7 °C), Min:97.5 °F (36.4 °C), Max:98.5 °F (36.9 °C)        Exam:  Patient without distress.               .    Lab/Data Review:  Pre op CBC    Lab Results   Component Value Date    WBC 6.6 2024    HGB 11.4 (L) 2024    HCT 39.1 2024    MCV 75.9 (L) 2024     2024         Assessment and Plan:  Patient appears to be having uncomplicated post OP course.  Continue routine post-op care.

## 2024-07-26 NOTE — PERIOP NOTE
TRANSFER - OUT REPORT:    Verbal report given to Marck MCCABE on Diana Hammond Pipo  being transferred to [facility] for ordered procedure       Report consisted of patient's Situation, Background, Assessment and   Recommendations(SBAR).     Information from the following report(s) Nurse Handoff Report, Adult Overview, Surgery Report, Intake/Output, MAR, and Cardiac Rhythm SR  was reviewed with the receiving nurse.           Lines:   Peripheral IV 07/26/24 Left;Posterior Hand (Active)   Site Assessment Clean, dry & intact 07/26/24 1013   Line Status Infusing 07/26/24 1013   Line Care Connections checked and tightened 07/26/24 1013   Phlebitis Assessment No symptoms 07/26/24 1013   Infiltration Assessment 0 07/26/24 1013   Dressing Status Clean, dry & intact 07/26/24 1013   Dressing Type Transparent 07/26/24 1013        Opportunity for questions and clarification was provided.      Patient transported with:  O2 @ 2lpm

## 2024-07-26 NOTE — CARE COORDINATION
CASE MANAGEMENT ASSESSMENT NOTE    Patient is a 39 year old female with abnormal uterine bleeding.      Patient assessment completed at bedside.  Patient presents to assessment alert and oriented, and answers questions appropriately.  She lives at home with spouse.  At baseline, she is independent with transfers. She works for "[employer]" and is insured with [email or web address].  She does not currently have a PCP.  RNCM offered to refer patient to PCP, but she states she wishes to do this herself.  RNCM provided patient with the phone numbers of Russell Vigil and [facility] find a provider lines.  At this time, patient does not anticipate any additional needs when discharged.    At this time, anticipate patient to be discharged home with no additional needs.  Case management will continue to follow.  Please notify if there are any changes.     Attending Physician: Josh Steinberg MD  Admit Problem: Abnormal uterine bleeding (AUB) [N93.9]  Fibroids [D21.9]  Fibroid uterus [D25.9]  Date/Time of Admission: 7/26/2024  5:36 AM  Problem List:  Patient Active Problem List   Diagnosis    Menorrhagia with regular cycle    Anemia due to chronic blood loss    Syncope and collapse    Class 2 severe obesity due to excess calories with serious comorbidity and body mass index (BMI) of 39.0 to 39.9 in adult (HCC)    Endometrial hyperplasia with atypia    Iron deficiency anemia due to chronic blood loss    Acute on chronic blood loss anemia    Fibroids    Menorrhagia    Abnormal uterine bleeding (AUB)    Fibroid uterus          07/26/24 1539   Service Assessment   Patient Orientation Alert and Oriented   Cognition Alert   History Provided By Patient   Primary Caregiver Self   Accompanied By/Relationship Spouse, [facility]   Support Systems Spouse/Significant Other   Patient's Healthcare Decision Maker is: Legal Next of Kin   PCP Verified by CM No  (Patient does not have a PCP, provided with find a provider lines.)   Prior Functional Level Independent in

## 2024-07-26 NOTE — ANESTHESIA PROCEDURE NOTES
Peripheral Block    Patient location during procedure: OR  Reason for block: post-op pain management and at surgeon's request  Start time: 7/26/2024 7:19 AM  End time: 7/26/2024 7:20 AM  Staffing  Performed: anesthesiologist   Anesthesiologist: Mignon Mccarty MD  Performed by: Mignon Mccarty MD  Authorized by: Mignon Mccarty MD    Preanesthetic Checklist  Completed: patient identified, IV checked, site marked, risks and benefits discussed, surgical/procedural consents, equipment checked, pre-op evaluation, timeout performed, anesthesia consent given, oxygen available and monitors applied/VS acknowledged  Peripheral Block   Patient position: supine  Prep: ChloraPrep  Provider prep: sterile gloves and mask  Patient monitoring: cardiac monitor, continuous pulse ox, frequent blood pressure checks, IV access, oxygen and responsive to questions  Block type: TAP  Laterality: left  Injection technique: single-shot  Guidance: ultrasound guided    Needle   Needle type: insulated echogenic nerve stimulator needle   Needle gauge: 20 G  Needle localization: ultrasound guidance  Needle length: 10 cm  Assessment   Injection assessment: negative aspiration for heme, no paresthesia on injection, local visualized surrounding nerve on ultrasound and no intravascular symptoms  Paresthesia pain: none  Slow fractionated injection: yes  Hemodynamics: stable  Outcomes: uncomplicated and patient tolerated procedure well    Additional Notes  Ultrasound image taken and stored in chart  Medications Administered  dexAMETHasone (DECADRON) (PF) 10 mg/mL injection - Other   4 mg - 7/26/2024 7:19:00 AM  ROPivacaine 0.5% with EPINEPHrine 1:[id number] injection (ANESTHESIA USE ONLY) (Mixture components: EPINEPHrine PF 1 MG/ML Soln, 0.005 mL; ROPivacaine 0.5% Soln, 1 mL) - Perineural   20 mL - 7/26/2024 7:19:00 AM

## 2024-07-26 NOTE — BRIEF OP NOTE
Brief Postoperative Note      Patient: Diana Foss  YOB: 1985  MRN: [id number]    Date of Procedure: 2024    Pre-Op Diagnosis Codes:     * Abnormal uterine bleeding (AUB) [N93.9]     * Fibroids [D21.9]    Post-Op Diagnosis: Same       Procedure(s):  OPEN UTERINE MYOMECTOMY    Surgeon(s):  Bess Cosby MD Lantz, Todd R, MD    Assistant:  Dr Harjeet Cosby's assistance was required due to the complexity of case.  Assistant aided in dissection of the fibroids and provided counter traction for dissection throughout the procedure.      Anesthesia: General    Estimated Blood Loss (mL): 150 mL    Complications: None    Specimens:   ID Type Source Tests Collected by Time Destination   A : Anterior Fibroid Tissue Uterus SURGICAL PATHOLOGY Josh Steinberg MD 2024    B : Intramural Fibroid Tissue Uterus SURGICAL PATHOLOGY Josh Steinberg MD 2024        Implants:  * No implants in log *      Drains:   Urinary Catheter 24 Turner (Active)       Findings:  Infection Present At Time Of Surgery (PATOS) (choose all levels that have infection present):  No infection present  Other Findings: Large approximately 10 cm fundal posterior necrotic calcified fibroid.  This was transmural.  Small anterior 3 cm fibroid more subserosal.  Patient would require  for any future pregnancy    Electronically signed by Josh Steinberg MD on 2024 at 9:11 AM

## 2024-07-26 NOTE — ANESTHESIA PRE PROCEDURE
Department of Anesthesiology  Preprocedure Note       Name:  Diana Foss   Age:  39 y.o.  :  1985                                          MRN:  [id number]         Date:  2024      Surgeon: Surgeon(s):  Josh Steinberg MD Hoy, Heather P, MD    Procedure: Procedure(s):  OPEN UTERINE MYOMECTOMY    Medications prior to admission:   Prior to Admission medications    Medication Sig Start Date End Date Taking? Authorizing Provider   ferrous sulfate (IRON 325) 325 (65 Fe) MG tablet Take 1 tablet by mouth at bedtime   Yes Provider, MD Carmen   ibuprofen (ADVIL;MOTRIN) 600 MG tablet Take 1 tablet by mouth 4 times daily as needed for Pain 24   Cruz Aguilera MD   medroxyPROGESTERone (PROVERA) 10 MG tablet Take 1 tablet by mouth in the morning and at bedtime 24   Cruz Aguilera MD   vitamin D (ERGOCALCIFEROL) 1.25 MG (58470 UT) CAPS capsule Take 1 capsule by mouth once a week 24   Meagan Turner APRN - CNP       Current medications:    Current Facility-Administered Medications   Medication Dose Route Frequency Provider Last Rate Last Admin    lidocaine 1 % injection 1 mL  1 mL IntraDERmal Once PRN Mignon Mccarty MD        lactated ringers IV soln infusion   IntraVENous Continuous Mignon Mccarty MD        sodium chloride flush 0.9 % injection 5-40 mL  5-40 mL IntraVENous 2 times per day Mignon Mccarty MD        sodium chloride flush 0.9 % injection 5-40 mL  5-40 mL IntraVENous PRN Mignon Mccarty MD        0.9 % sodium chloride infusion   IntraVENous PRN Mignon Mccarty MD        midazolam PF (VERSED) injection 2 mg  2 mg IntraVENous Once PRN Mignon Mccarty MD        sodium chloride flush 0.9 % injection 5-40 mL  5-40 mL IntraVENous 2 times per day Josh Steinberg MD        sodium chloride flush 0.9 % injection 5-40 mL  5-40 mL IntraVENous PRN Josh Steinberg MD        0.9 % sodium chloride infusion   IntraVENous PRN Josh Steinberg MD        ceFAZolin (ANCEF)

## 2024-07-26 NOTE — PROGRESS NOTES
TRANSFER - IN REPORT:    Verbal [facility] for routine post-op      Report consisted of patient's Situation, Background, Assessment and   Recommendations(SBAR).     Information from the following report(s) Surgery Report and Recent Results was reviewed with the receiving nurse.    Opportunity for questions and clarification was provided.      Assessment completed upon patient's arrival to unit and care assumed.

## 2024-07-27 LAB
ERYTHROCYTE [DISTWIDTH] IN BLOOD BY AUTOMATED COUNT: 19.7 % (ref 11.9–14.6)
HCT VFR BLD AUTO: 35.3 % (ref 35.8–46.3)
HGB BLD-MCNC: 9.9 G/DL (ref 11.7–15.4)
MCH RBC QN AUTO: 21.7 PG (ref 26.1–32.9)
MCHC RBC AUTO-ENTMCNC: 28 G/DL (ref 31.4–35)
MCV RBC AUTO: 77.2 FL (ref 82–102)
NRBC # BLD: 0 K/UL (ref 0–0.2)
PLATELET # BLD AUTO: 469 K/UL (ref 150–450)
PMV BLD AUTO: 10.7 FL (ref 9.4–12.3)
RBC # BLD AUTO: 4.57 M/UL (ref 4.05–5.2)
WBC # BLD AUTO: 10.9 K/UL (ref 4.3–11.1)

## 2024-07-27 PROCEDURE — 1100000000 HC RM PRIVATE

## 2024-07-27 PROCEDURE — 2500000003 HC RX 250 WO HCPCS: Performed by: OBSTETRICS & GYNECOLOGY

## 2024-07-27 PROCEDURE — 2580000003 HC RX 258: Performed by: OBSTETRICS & GYNECOLOGY

## 2024-07-27 PROCEDURE — 6360000002 HC RX W HCPCS: Performed by: OBSTETRICS & GYNECOLOGY

## 2024-07-27 PROCEDURE — 6370000000 HC RX 637 (ALT 250 FOR IP): Performed by: OBSTETRICS & GYNECOLOGY

## 2024-07-27 PROCEDURE — 36415 COLL VENOUS BLD VENIPUNCTURE: CPT

## 2024-07-27 PROCEDURE — 85027 COMPLETE CBC AUTOMATED: CPT

## 2024-07-27 RX ADMIN — KETOROLAC TROMETHAMINE 30 MG: 30 INJECTION, SOLUTION INTRAMUSCULAR at 05:45

## 2024-07-27 RX ADMIN — OXYCODONE 10 MG: 5 TABLET ORAL at 17:54

## 2024-07-27 RX ADMIN — IBUPROFEN 800 MG: 800 TABLET, FILM COATED ORAL at 19:43

## 2024-07-27 RX ADMIN — ACETAMINOPHEN 1000 MG: 500 TABLET, FILM COATED ORAL at 05:44

## 2024-07-27 RX ADMIN — SODIUM CHLORIDE, PRESERVATIVE FREE 10 ML: 5 INJECTION INTRAVENOUS at 05:05

## 2024-07-27 RX ADMIN — SODIUM CHLORIDE, SODIUM LACTATE, POTASSIUM CHLORIDE, CALCIUM CHLORIDE AND DEXTROSE MONOHYDRATE: 5; 600; 310; 30; 20 INJECTION, SOLUTION INTRAVENOUS at 05:09

## 2024-07-27 RX ADMIN — SODIUM CHLORIDE, PRESERVATIVE FREE 10 ML: 5 INJECTION INTRAVENOUS at 19:46

## 2024-07-27 RX ADMIN — ACETAMINOPHEN 1000 MG: 500 TABLET, FILM COATED ORAL at 14:58

## 2024-07-27 RX ADMIN — ACETAMINOPHEN 1000 MG: 500 TABLET, FILM COATED ORAL at 23:24

## 2024-07-27 RX ADMIN — OXYCODONE 10 MG: 5 TABLET ORAL at 09:11

## 2024-07-27 RX ADMIN — MORPHINE SULFATE 2 MG: 2 INJECTION, SOLUTION INTRAMUSCULAR; INTRAVENOUS at 05:03

## 2024-07-27 RX ADMIN — KETOROLAC TROMETHAMINE 30 MG: 30 INJECTION, SOLUTION INTRAMUSCULAR at 12:08

## 2024-07-27 RX ADMIN — SODIUM CHLORIDE, PRESERVATIVE FREE 10 ML: 5 INJECTION INTRAVENOUS at 05:46

## 2024-07-27 RX ADMIN — MORPHINE SULFATE 2 MG: 2 INJECTION, SOLUTION INTRAMUSCULAR; INTRAVENOUS at 00:19

## 2024-07-27 RX ADMIN — SODIUM CHLORIDE, PRESERVATIVE FREE 10 ML: 5 INJECTION INTRAVENOUS at 00:20

## 2024-07-27 ASSESSMENT — PAIN SCALES - GENERAL
PAINLEVEL_OUTOF10: 8
PAINLEVEL_OUTOF10: 3
PAINLEVEL_OUTOF10: 2
PAINLEVEL_OUTOF10: 5
PAINLEVEL_OUTOF10: 6
PAINLEVEL_OUTOF10: 3
PAINLEVEL_OUTOF10: 6
PAINLEVEL_OUTOF10: 3
PAINLEVEL_OUTOF10: 6
PAINLEVEL_OUTOF10: 4
PAINLEVEL_OUTOF10: 7

## 2024-07-27 ASSESSMENT — PAIN - FUNCTIONAL ASSESSMENT: PAIN_FUNCTIONAL_ASSESSMENT: PREVENTS OR INTERFERES SOME ACTIVE ACTIVITIES AND ADLS

## 2024-07-27 ASSESSMENT — PAIN DESCRIPTION - DESCRIPTORS
DESCRIPTORS: ACHING
DESCRIPTORS: ACHING

## 2024-07-27 ASSESSMENT — PAIN DESCRIPTION - LOCATION
LOCATION: ABDOMEN
LOCATION: ABDOMEN

## 2024-07-27 ASSESSMENT — PAIN DESCRIPTION - ORIENTATION: ORIENTATION: MID;LOWER

## 2024-07-27 NOTE — PROGRESS NOTES
Gynecology Progress Note    Patient doing well post-op day 1 from myomectomy without significant complaints.  Pain controlled on current medication.  Voiding without difficulty.     Vitals:  Blood pressure (!) 155/73, pulse 68, temperature 98.9 °F (37.2 °C), temperature source Oral, resp. rate 15, height 1.6 m (5' 2.99\"), weight 107.8 kg (237 lb 9.6 oz), SpO2 98 %.  Temp (24hrs), Av.5 °F (36.9 °C), Min:98.1 °F (36.7 °C), Max:98.9 °F (37.2 °C)        Exam:  Patient without distress.               Abdomen soft,  mild tender.                 Dressing dry and clean without erythema.               Lower extremities are negative for swelling, cords, or tenderness.    Lab/Data Review:  Lab Results   Component Value Date    WBC 10.9 2024    HGB 9.9 (L) 2024    HCT 35.3 (L) 2024    MCV 77.2 (L) 2024     (H) 2024        Assessment and Plan:  Patient appears to be having uncomplicated post OP course.  Continue routine post-op care.  Remove dressing and ambulate today.

## 2024-07-27 NOTE — PLAN OF CARE
Problem: Pain  Goal: Verbalizes/displays adequate comfort level or baseline comfort level  Outcome: Progressing     Problem: Safety - Adult  Goal: Free from fall injury  Outcome: Progressing     Problem: Discharge Planning  Goal: Discharge to home or other facility with appropriate resources  Outcome: Progressing  Flowsheets (Taken 7/26/2024 2122)  Discharge to home or other facility with appropriate resources: Identify discharge learning needs (meds, wound care, etc)

## 2024-07-27 NOTE — OP NOTE
Operative Note      Patient: Diana Foss  YOB: 1985  MRN: [id number]    Date of Procedure: 7/26/2024    Pre-Op Diagnosis Codes:     * Abnormal uterine bleeding (AUB) [N93.9]     * Fibroids [D21.9]    Post-Op Diagnosis: Same       Procedure(s):  OPEN UTERINE MYOMECTOMY    Surgeon(s):  Bess Cosby MD Lantz, Todd R, MD    Assistant: Dr Harjeet Cosby's assistance was required due to the complexity of the case.  She manipulated the uterus aided in traction countertraction and resection of the uterine fibroids and repair of the myometrium.    Anesthesia: General    Estimated Blood Loss (mL): 150    Complications: None    Specimens:   ID Type Source Tests Collected by Time Destination   A : Anterior Fibroid Tissue Uterus SURGICAL PATHOLOGY Josh Steinberg MD 7/26/2024 0820    B : Intramural Fibroid Tissue Uterus SURGICAL PATHOLOGY Josh Steinberg MD 7/26/2024 0820        Implants:  * No implants in log *      Drains:   [REMOVED] Urinary Catheter 07/26/24 Turner (Removed)   Catheter Indications Perioperative use for selected surgical procedures 07/26/24 2122   Site Assessment No urethral drainage 07/26/24 2122   Urine Color Yellow 07/26/24 2122   Urine Appearance Clear 07/26/24 2122   Urine Odor Other (Comment) 07/26/24 1013   Collection Container Standard 07/26/24 2122   Securement Method Securing device (Describe) 07/26/24 2122   Catheter Care  Perineal wipes 07/26/24 2122   Catheter Best Practices  Drainage tube clipped to bed;Tamper seal intact;Catheter secured to thigh;Bag below bladder;Bag not on floor;Lack of dependent loop in tubing;Drainage bag less than half full 07/26/24 2122   Status Draining;Patent 07/26/24 2122   Output (mL) 300 mL 07/27/24 0511   Discontinuation Reason Per provider order 07/27/24 0511       Findings:  Infection Present At Time Of Surgery (PATOS) (choose all levels that have infection present):  No infection present  Other Findings: Enlarged uterus with large

## 2024-07-27 NOTE — PLAN OF CARE
Problem: Pain  Goal: Verbalizes/displays adequate comfort level or baseline comfort level  Outcome: Progressing     Problem: Safety - Adult  Goal: Free from fall injury  Outcome: Progressing     Problem: Discharge Planning  Goal: Discharge to home or other facility with appropriate resources  Outcome: Progressing  Flowsheets (Taken 7/27/2024 1941)  Discharge to home or other facility with appropriate resources: Identify discharge learning needs (meds, wound care, etc)

## 2024-07-28 VITALS
DIASTOLIC BLOOD PRESSURE: 100 MMHG | SYSTOLIC BLOOD PRESSURE: 159 MMHG | OXYGEN SATURATION: 97 % | HEIGHT: 63 IN | RESPIRATION RATE: 18 BRPM | TEMPERATURE: 98.1 F | BODY MASS INDEX: 42.1 KG/M2 | HEART RATE: 68 BPM | WEIGHT: 237.6 LBS

## 2024-07-28 LAB
GLUCOSE BLD STRIP.AUTO-MCNC: 104 MG/DL (ref 65–100)
GLUCOSE BLD STRIP.AUTO-MCNC: 97 MG/DL (ref 65–100)
SERVICE CMNT-IMP: ABNORMAL
SERVICE CMNT-IMP: NORMAL

## 2024-07-28 PROCEDURE — G0378 HOSPITAL OBSERVATION PER HR: HCPCS

## 2024-07-28 PROCEDURE — 96360 HYDRATION IV INFUSION INIT: CPT

## 2024-07-28 PROCEDURE — 82962 GLUCOSE BLOOD TEST: CPT

## 2024-07-28 PROCEDURE — 6370000000 HC RX 637 (ALT 250 FOR IP): Performed by: OBSTETRICS & GYNECOLOGY

## 2024-07-28 RX ORDER — OXYCODONE HYDROCHLORIDE 5 MG/1
5 TABLET ORAL EVERY 6 HOURS PRN
Qty: 20 TABLET | Refills: 0 | Status: SHIPPED | OUTPATIENT
Start: 2024-07-28 | End: 2024-08-02

## 2024-07-28 RX ORDER — IBUPROFEN 800 MG/1
800 TABLET ORAL EVERY 8 HOURS PRN
Qty: 40 TABLET | Refills: 0 | Status: SHIPPED | OUTPATIENT
Start: 2024-07-28

## 2024-07-28 RX ADMIN — OXYCODONE 5 MG: 5 TABLET ORAL at 08:52

## 2024-07-28 RX ADMIN — IBUPROFEN 800 MG: 800 TABLET, FILM COATED ORAL at 08:52

## 2024-07-28 RX ADMIN — IBUPROFEN 800 MG: 800 TABLET, FILM COATED ORAL at 02:14

## 2024-07-28 RX ADMIN — OXYCODONE 10 MG: 5 TABLET ORAL at 04:53

## 2024-07-28 RX ADMIN — ACETAMINOPHEN 1000 MG: 500 TABLET, FILM COATED ORAL at 13:20

## 2024-07-28 RX ADMIN — OXYCODONE 5 MG: 5 TABLET ORAL at 13:20

## 2024-07-28 RX ADMIN — ACETAMINOPHEN 1000 MG: 500 TABLET, FILM COATED ORAL at 05:43

## 2024-07-28 ASSESSMENT — PAIN SCALES - GENERAL
PAINLEVEL_OUTOF10: 4
PAINLEVEL_OUTOF10: 6
PAINLEVEL_OUTOF10: 0
PAINLEVEL_OUTOF10: 5
PAINLEVEL_OUTOF10: 7
PAINLEVEL_OUTOF10: 5

## 2024-07-28 ASSESSMENT — PAIN DESCRIPTION - LOCATION: LOCATION: ABDOMEN

## 2024-07-28 NOTE — DISCHARGE SUMMARY
Gynecology Discharge Summary     Name: Diana Foss MRN: [id number]  SSN: [id number]    YOB: 1985  Age: 39 y.o.  Sex: female      Allergies: Patient has no known allergies.    Admit Date: 7/26/2024    Discharge Date: 7/28/2024      Admitting Physician: Josh Steinberg MD     Discharge Physician: Josh Steinberg MD     * Admission Diagnoses: Abnormal uterine bleeding (AUB) [N93.9]  Fibroids [D21.9]  Fibroid uterus [D25.9]    * Discharge Diagnoses:      * Procedures: Myomectomy    Consults: None    * Discharge Condition: Good    * Hospital Course:   Normal post operative course    * Discharge Disposition: Home    Discharge Medications:      Medication List        START taking these medications      oxyCODONE 5 MG immediate release tablet  Commonly known as: ROXICODONE  Take 1 tablet by mouth every 6 hours as needed for Pain for up to 5 days. Max Daily Amount: 20 mg            CHANGE how you take these medications      ibuprofen 800 MG tablet  Commonly known as: ADVIL;MOTRIN  Take 1 tablet by mouth every 8 hours as needed for Pain  What changed:   medication strength  how much to take  when to take this            CONTINUE taking these medications      ferrous sulfate 325 (65 Fe) MG tablet  Commonly known as: IRON 325     vitamin D 1.25 MG (59499 UT) Caps capsule  Commonly known as: ERGOCALCIFEROL  Take 1 capsule by mouth once a week            STOP taking these medications      medroxyPROGESTERone 10 MG tablet  Commonly known as: Provera               Where to Get Your Medications        These medications were sent to [id number] - [place], [place] - [address] [phone] - F [phone]  [address] [place] [id number]      Phone: [phone]   ibuprofen 800 MG tablet  oxyCODONE 5 MG immediate release tablet          * Follow-up Care/Patient Instructions:  Activity: No sex, douching, or tampons for 6 weeks or as directed by your physician. No heavy lifting for 6

## 2024-07-28 NOTE — PROGRESS NOTES
Gynecology Progress Note    Patient doing well post-op day 2 from Myomectomy without significant complaints.  Pain controlled on current medication.  Voiding without difficulty. Patient is passing flatus.    Vitals:  Blood pressure (!) 159/100, pulse 68, temperature 98.1 °F (36.7 °C), temperature source Oral, resp. rate 18, height 1.6 m (5' 2.99\"), weight 107.8 kg (237 lb 9.6 oz), SpO2 97 %.  Temp (24hrs), Av °F (36.7 °C), Min:97.8 °F (36.6 °C), Max:98.1 °F (36.7 °C)        Exam:  Patient without distress.               Abdomen soft,  nontender.                 Incision dry and clean without erythema.               Lower extremities are negative for swelling, cords, or tenderness.    Lab/Data Review:      Assessment and Plan:  Patient appears to be having uncomplicated post OP course.  Continue routine post-op care. Plan discharge home

## 2024-08-07 NOTE — PROGRESS NOTES
Diana presents for postop visit from Open Uterine Myomectomy about 2 weeks ago.  Doing well postoperatively.without any bleeding.  Fever: No Voiding well: Yes.  Bowel movements OK: Yes. Having some pain. 7/28-8/1 had bleeding. Has not had any bleeding since.     Exam: A&OX3, NAD.  Abdomen: Appropriately tender no masses no rebound guarding or distention or masses incisions clean, dry, intact    Discussed pathology report which was   A:  \" ANTERIOR FIBROID\":         CELLULAR LEIOMYOMA          B:  \" INTRAMURAL FIBROID\":         FRAGMENTS OF HYALINIZED AND CALCIFIED LEIOMYOMA     A/P.  Stable Post op condition.  Gradually increase activity. Resumption of sexual activity is not encouraged at this time.  Follow up 2 weeks.    Portions of this note were created using voice recognition software. Despite my efforts to edit grammatical and transcription errors, bizarre and nonsensical sentences may still exist.

## 2024-08-08 ENCOUNTER — OFFICE VISIT (OUTPATIENT)
Dept: OBGYN CLINIC | Age: 39
End: 2024-08-08

## 2024-08-08 VITALS
BODY MASS INDEX: 41.76 KG/M2 | WEIGHT: 235.7 LBS | SYSTOLIC BLOOD PRESSURE: 150 MMHG | HEIGHT: 63 IN | DIASTOLIC BLOOD PRESSURE: 100 MMHG

## 2024-08-08 DIAGNOSIS — Z09 POSTOPERATIVE EXAMINATION: Primary | ICD-10-CM

## 2024-08-08 PROCEDURE — 99024 POSTOP FOLLOW-UP VISIT: CPT | Performed by: OBSTETRICS & GYNECOLOGY

## 2024-08-08 RX ORDER — OXYCODONE HYDROCHLORIDE 5 MG/1
5 CAPSULE ORAL EVERY 8 HOURS
COMMUNITY

## 2024-08-21 NOTE — PROGRESS NOTES
Diana presents for postop visit from Open Uterine Myomectomy about 4 weeks ago. Doing well postoperatively.with very small amount of spotting, but only with wiping. Thinks she may be getting ready to start her period. Fever: No Voiding well: Yes.  Bowel movements OK: Yes.     Exam: A&OX3, NAD.  Abdomen:  Non tender Incisions clean, dry, intact    Discussed pathology report which was   A:  \" ANTERIOR FIBROID\":         CELLULAR LEIOMYOMA          B:  \" INTRAMURAL FIBROID\":         FRAGMENTS OF HYALINIZED AND CALCIFIED LEIOMYOMA     A/P.  Stable Post op condition.  Gradually increase activity. Resumption of sexual activity is not encouraged at this time.  Follow up 2 weeks.    Portions of this note were created using voice recognition software. Despite my efforts to edit grammatical and transcription errors, bizarre and nonsensical sentences may still exist.

## 2024-08-23 ENCOUNTER — OFFICE VISIT (OUTPATIENT)
Dept: OBGYN CLINIC | Age: 39
End: 2024-08-23

## 2024-08-23 VITALS
HEIGHT: 63 IN | BODY MASS INDEX: 42.05 KG/M2 | DIASTOLIC BLOOD PRESSURE: 100 MMHG | WEIGHT: 237.3 LBS | SYSTOLIC BLOOD PRESSURE: 138 MMHG

## 2024-08-23 DIAGNOSIS — Z48.89 ENCOUNTER FOR POST SURGICAL WOUND CHECK: Primary | ICD-10-CM

## 2024-08-23 PROCEDURE — 99024 POSTOP FOLLOW-UP VISIT: CPT | Performed by: OBSTETRICS & GYNECOLOGY

## 2024-09-05 NOTE — PROGRESS NOTES
Diana presents for postop visit from Open Uterine Myomectomy about 6 weeks ago. Doing well postoperatively.without any bleeding.  Fever: No Voiding well: Yes.  Bowel movements OK: Yes, but still having some constipation but is taking stool softener. Sometimes still having pain with movement.     Exam: A&OX3, NAD.  Abdomen:  Non tender Incisions clean, dry, intact    Discussed pathology report which was   A: \"ANTERIOR FIBROID\": CELLULAR LEIOMYOMA          B: \"INTRAMURAL FIBROID\": FRAGMENTS OF HYALINIZED AND CALCIFIED LEIOMYOMA     A/P.  Stable Post op condition.  Gradually increase activity. Resumption of sexual activity is  encouraged at this time.  Follow up in approximately 4 months so we can see how her periods respond to having done the myomectomy.    Portions of this note were created using voice recognition software. Despite my efforts to edit grammatical and transcription errors, bizarre and nonsensical sentences may still exist.

## 2024-09-06 ENCOUNTER — OFFICE VISIT (OUTPATIENT)
Dept: OBGYN CLINIC | Age: 39
End: 2024-09-06

## 2024-09-06 VITALS
DIASTOLIC BLOOD PRESSURE: 90 MMHG | BODY MASS INDEX: 41.73 KG/M2 | SYSTOLIC BLOOD PRESSURE: 138 MMHG | HEIGHT: 63 IN | WEIGHT: 235.5 LBS

## 2024-09-06 DIAGNOSIS — Z09 POSTOPERATIVE EXAMINATION: Primary | ICD-10-CM

## 2024-09-06 PROCEDURE — 99024 POSTOP FOLLOW-UP VISIT: CPT | Performed by: OBSTETRICS & GYNECOLOGY

## 2024-11-06 ENCOUNTER — OFFICE VISIT (OUTPATIENT)
Dept: OBGYN CLINIC | Age: 39
End: 2024-11-06
Payer: COMMERCIAL

## 2024-11-06 VITALS — WEIGHT: 239.2 LBS | BODY MASS INDEX: 42.38 KG/M2 | HEIGHT: 63 IN

## 2024-11-06 DIAGNOSIS — R10.2 PELVIC PRESSURE IN FEMALE: Primary | ICD-10-CM

## 2024-11-06 PROCEDURE — 99214 OFFICE O/P EST MOD 30 MIN: CPT | Performed by: OBSTETRICS & GYNECOLOGY

## 2024-11-06 NOTE — PROGRESS NOTES
The patient is a 39 y.o.  who is seen for a discussion for residual pelvic discomfort. Pt had surgery OPEN UTERINE MYOMECTOMY  w/ Dr. Steinberg on 24 and states she is still in pain and needs a FMLA paper so if she needs to be off of work one day due to the pain, she cannot be penalized for this.    Patient has not missed a day of work but has used some PTO on occasion to leave work early due to episodes of \"pressure\" or gas fullness in incision area that improves when she  lays down.  Occurs 3-4 times a weeks, usually improves over a few hours.  Has not used any NSAID or tylenol to improve.  Does not bother her during intercourse. Admits that some foods may make it worse.      HISTORY:      No LMP recorded. (Menstrual status: Irregular periods).    Current Outpatient Medications on File Prior to Visit   Medication Sig Dispense Refill    ibuprofen (ADVIL;MOTRIN) 800 MG tablet Take 1 tablet by mouth every 8 hours as needed for Pain 40 tablet 0    ferrous sulfate (IRON 325) 325 (65 Fe) MG tablet Take 1 tablet by mouth at bedtime      vitamin D (ERGOCALCIFEROL) 1.25 MG (01602 UT) CAPS capsule Take 1 capsule by mouth once a week 20 capsule 0    oxyCODONE 5 MG capsule Take 1 capsule by mouth every 8 (eight) hours. (Patient not taking: Reported on 2024)       No current facility-administered medications on file prior to visit.       ROS:  Feeling well. No dyspnea or chest pain on exertion.  No abdominal pain, change in bowel habits, black or bloody stools.  No urinary tract symptoms. GYN ROS: See HPI.    PHYSICAL EXAM:  Height 1.6 m (5' 2.99\"), weight 108.5 kg (239 lb 3.2 oz).    The patient appears well, alert, oriented x 3, in no distress.  Lungs are clear. Heart RRR, no murmurs. Abdomen soft without tenderness, guarding, mass or organomegaly.  Incision CDI.  Pelvic: examination not indicated.    ASSESSMENT:    1. Pelvic pressure in female       PLAN:  Pelvic ultrasound and f/u  If negative then GI

## 2024-11-26 NOTE — PROGRESS NOTES
Diana  is a 39 y.o. female, , Patient's last menstrual period was 2024 (exact date).,  who is seen for gyn ultrasound performed secondary to pelvic pressure.   History of Open Uterine Myomectomy on 2024 by Dr. Steinberg.     Ultrasound findings from today:  Cervix within normal limits.   Uterus is anteverted, enlarged and heterogenous.   One calcified fibroid seen anteriorly.   Endo= 16 mm, echogenic mass seen in fundal endo measuring 2.6 x 1.1 x 2.4 cm may represent an endometrial polyp VS blood products. Increased flow seen within endo.   ROV- appears polycystic   LOV- appears polycystic   Bilateral ovaries position side by side directly posterior to cervix. Increased blood flow noted surrounding ovaries. Tissue surrounding ovaries appears possibly inflamed.   Uterine volume= 350.890 cm     HISTORY:  Sexual History:  has sex with males  Contraception:  none  Current Outpatient Medications on File Prior to Visit   Medication Sig Dispense Refill    ferrous sulfate (IRON 325) 325 (65 Fe) MG tablet Take 1 tablet by mouth at bedtime      vitamin D (ERGOCALCIFEROL) 1.25 MG (07538 UT) CAPS capsule Take 1 capsule by mouth once a week 20 capsule 0    oxyCODONE 5 MG capsule Take 1 capsule by mouth every 8 (eight) hours. (Patient not taking: Reported on 2024)      ibuprofen (ADVIL;MOTRIN) 800 MG tablet Take 1 tablet by mouth every 8 hours as needed for Pain 40 tablet 0     No current facility-administered medications on file prior to visit.       ROS:  Review of Systems     Diana  has a past medical history of Endometrial hyperplasia with atypia, Fibroid, History of blood transfusion, Iron deficiency anemia, Keloid of skin, Menorrhagia, and Thickened endometrium. .    Previous surgeries include  has a past surgical history that includes Dilation and curettage of uterus (2016); Appendectomy; Cholecystectomy; Dilation and curettage of uterus (N/A, 06/15/2022); Dilation and curettage of uterus (N/A,

## 2024-11-27 ENCOUNTER — OFFICE VISIT (OUTPATIENT)
Dept: OBGYN CLINIC | Age: 39
End: 2024-11-27
Payer: COMMERCIAL

## 2024-11-27 ENCOUNTER — PROCEDURE VISIT (OUTPATIENT)
Dept: OBGYN CLINIC | Age: 39
End: 2024-11-27
Payer: COMMERCIAL

## 2024-11-27 VITALS — WEIGHT: 241.3 LBS | HEIGHT: 63 IN | BODY MASS INDEX: 42.75 KG/M2

## 2024-11-27 DIAGNOSIS — Z98.890 HISTORY OF MYOMECTOMY: ICD-10-CM

## 2024-11-27 DIAGNOSIS — R10.2 PELVIC PRESSURE IN FEMALE: Primary | ICD-10-CM

## 2024-11-27 PROCEDURE — 76830 TRANSVAGINAL US NON-OB: CPT | Performed by: OBSTETRICS & GYNECOLOGY

## 2024-11-27 PROCEDURE — 99213 OFFICE O/P EST LOW 20 MIN: CPT | Performed by: OBSTETRICS & GYNECOLOGY

## 2025-05-01 ENCOUNTER — PATIENT MESSAGE (OUTPATIENT)
Dept: OBGYN CLINIC | Age: 40
End: 2025-05-01

## 2025-05-01 NOTE — TELEPHONE ENCOUNTER
GYN patient last seen 11/27/2024 c/o pelvic pressure, had recheck with US. At this appointment was advised to return for pelvic US in 3 months to reassess lining, no record of follow up. Patient had open uterine myomectomy with Dr. Steinberg 7/26/2024. Patient now reporting bleeding x 1 month and requesting medication  to help stop bleeding. Patient planning to schedule an appointment for evaluation.

## 2025-05-05 NOTE — TELEPHONE ENCOUNTER
No answer from patient regarding need for appointment, tried to call patient and speak with her -- patient did not answer and no voicemail box available.

## 2025-05-08 NOTE — TELEPHONE ENCOUNTER
Patient read imedo message indicating need for appointment and did not respond, Could not be reached over the phone.

## (undated) DEVICE — PAD,NON-ADHERENT,3X8,STERILE,LF,1/PK: Brand: MEDLINE

## (undated) DEVICE — CATHETER INTERMITTENT STR 16FR X 16IN

## (undated) DEVICE — SPONGE LAP W18XL18IN WHT COT 4 PLY FLD STRUNG RADPQ DISP ST 2 PER PACK

## (undated) DEVICE — GOWN,REINF,POLY,ECL,PP SLV,XL: Brand: MEDLINE

## (undated) DEVICE — DRAPE,UNDERBUTTOCKS,PCH,STERILE: Brand: MEDLINE

## (undated) DEVICE — SYRINGE MED 10ML LUERLOCK TIP W/O SFTY DISP

## (undated) DEVICE — BARRIER ADH W3XL4IN UTER PELV ABSRB GYNECARE INTCEED

## (undated) DEVICE — 1LYRTR 16FR10ML 100%SILI SNAP: Brand: MEDLINE INDUSTRIES, INC.

## (undated) DEVICE — PREMIUM WET SKIN PREP TRAY: Brand: MEDLINE INDUSTRIES, INC.

## (undated) DEVICE — SURGICAL PROCEDURE PACK BASIC ST FRANCIS

## (undated) DEVICE — CARDINAL HEALTH FLEXIBLE LIGHT HANDLE COVER: Brand: CARDINAL HEALTH

## (undated) DEVICE — CYSTO/BLADDER IRRIGATION SET, REGULATING CLAMP

## (undated) DEVICE — CONTAINER,SPECIMEN,O.R.STRL,4.5OZ: Brand: MEDLINE

## (undated) DEVICE — SYRINGE NDL 25GA 1ML L5/8IN BLU PLAS NDL S STL SHLD HYPO

## (undated) DEVICE — MINOR LITHOTOMY PACK: Brand: MEDLINE INDUSTRIES, INC.

## (undated) DEVICE — SUTURE PLN GUT SZ 2-0 L27IN ABSRB YELLOWISH TAN L40MM CT 853H

## (undated) DEVICE — GLOVE SURG SZ 75 CRM LTX FREE POLYISOPRENE POLYMER BEAD ANTI

## (undated) DEVICE — SYRINGE IRRIG 60ML SFT PLIABLE BLB EZ TO GRP 1 HND USE W/

## (undated) DEVICE — Device

## (undated) DEVICE — SUTURE VICRYL SZ 0 L36IN ABSRB UD L36MM CT-1 1/2 CIR J946H

## (undated) DEVICE — GAUZE,SPONGE,4"X4",16PLY,STRL,LF,10/TRAY: Brand: MEDLINE

## (undated) DEVICE — GARMENT,MEDLINE,DVT,INT,CALF,MED, GEN2: Brand: MEDLINE

## (undated) DEVICE — SOLUTION IRRIG 3000ML 0.9% SOD CHL USP UROMATIC PLAS CONT

## (undated) DEVICE — SOLUTION IRRIG 1000ML 0.9% SOD CHL USP POUR PLAS BTL

## (undated) DEVICE — BASIC SINGLE BASIN-LF: Brand: MEDLINE INDUSTRIES, INC.

## (undated) DEVICE — DRAPE, LAVH, STERILE: Brand: MEDLINE

## (undated) DEVICE — SUTURE VICRYL + SZ 3-0 L36IN ABSRB UD L36MM CT-1 1/2 CIR VCP944H

## (undated) DEVICE — GLOVE SURG SZ 8 L12IN FNGR THK79MIL GRN LTX FREE

## (undated) DEVICE — SUTURE VICRYL SZ 4-0 L18IN ABSRB UD L19MM PS-2 3/8 CIR PRIM J496H

## (undated) DEVICE — HYPODERMIC SAFETY NEEDLE: Brand: MAGELLAN

## (undated) DEVICE — SUTURE VICRYL + SZ 0 L27IN ABSRB UD L36MM CT-1 1/2 CIR VCPP41D

## (undated) DEVICE — SYRINGE MED 10ML TRNSLUC BRL PLUNG BLK MRK POLYPR CTRL

## (undated) DEVICE — DRAPE,T,CYSTO,STERILE: Brand: MEDLINE

## (undated) DEVICE — ELECTRODE BLDE L6.5IN CAUT EXT DISP

## (undated) DEVICE — SUTURE VICRYL SZ 3-0 L36IN ABSRB UD L36MM CT-1 1/2 CIR J944H

## (undated) DEVICE — DRAPE TWL SURG 16X26IN BLU ORB04] ALLCARE INC]

## (undated) DEVICE — APPLICATOR MEDICATED 26 CC SOLUTION HI LT ORNG CHLORAPREP

## (undated) DEVICE — CANISTER, RIGID, 2000CC: Brand: MEDLINE INDUSTRIES, INC.

## (undated) DEVICE — ELECTRODE PT RET AD L9FT HI MOIST COND ADH HYDRGEL CORDED

## (undated) DEVICE — SUTURE VICRYL SZ 3-0 L27IN ABSRB VLT L26MM SH 1/2 CIR J316H

## (undated) DEVICE — DRAPE,T,LAPARO,TRANS,STERILE: Brand: MEDLINE

## (undated) DEVICE — CATHETER URETH 16FR RED RUB INTMIT ALL PURP 12 PER CA

## (undated) DEVICE — 48" PROBE COVER W/GEL, ULTRASOUND, STERILE: Brand: SITE-RITE

## (undated) DEVICE — SUTURE V-LOC 180 SZ 0 L12IN ABSRB GRN L37MM GS-21 1/2 CIR VLOCL0316

## (undated) DEVICE — GARMENT,MEDLINE,DVT,INT,CALF,LG, GEN2: Brand: MEDLINE